# Patient Record
Sex: FEMALE | Race: WHITE | NOT HISPANIC OR LATINO | Employment: OTHER | URBAN - METROPOLITAN AREA
[De-identification: names, ages, dates, MRNs, and addresses within clinical notes are randomized per-mention and may not be internally consistent; named-entity substitution may affect disease eponyms.]

---

## 2017-08-25 ENCOUNTER — CONVERSION ENCOUNTER (OUTPATIENT)
Dept: MAMMOGRAPHY | Facility: HOSPITAL | Age: 72
End: 2017-08-25

## 2017-09-06 ENCOUNTER — CONVERSION ENCOUNTER (OUTPATIENT)
Dept: MAMMOGRAPHY | Facility: HOSPITAL | Age: 72
End: 2017-09-06

## 2018-01-30 ENCOUNTER — OFFICE VISIT CONVERTED (OUTPATIENT)
Dept: FAMILY MEDICINE CLINIC | Facility: CLINIC | Age: 73
End: 2018-01-30
Attending: NURSE PRACTITIONER

## 2018-03-06 ENCOUNTER — CONVERSION ENCOUNTER (OUTPATIENT)
Dept: MAMMOGRAPHY | Facility: HOSPITAL | Age: 73
End: 2018-03-06

## 2018-06-07 ENCOUNTER — OFFICE VISIT CONVERTED (OUTPATIENT)
Dept: FAMILY MEDICINE CLINIC | Facility: CLINIC | Age: 73
End: 2018-06-07
Attending: NURSE PRACTITIONER

## 2018-06-07 ENCOUNTER — CONVERSION ENCOUNTER (OUTPATIENT)
Dept: FAMILY MEDICINE CLINIC | Facility: CLINIC | Age: 73
End: 2018-06-07

## 2018-07-27 ENCOUNTER — CONVERSION ENCOUNTER (OUTPATIENT)
Dept: MAMMOGRAPHY | Facility: HOSPITAL | Age: 73
End: 2018-07-27

## 2018-08-14 ENCOUNTER — OFFICE VISIT CONVERTED (OUTPATIENT)
Dept: FAMILY MEDICINE CLINIC | Facility: CLINIC | Age: 73
End: 2018-08-14
Attending: NURSE PRACTITIONER

## 2018-12-13 ENCOUNTER — OFFICE VISIT CONVERTED (OUTPATIENT)
Dept: FAMILY MEDICINE CLINIC | Facility: CLINIC | Age: 73
End: 2018-12-13
Attending: NURSE PRACTITIONER

## 2019-02-09 ENCOUNTER — OFFICE VISIT CONVERTED (OUTPATIENT)
Dept: FAMILY MEDICINE CLINIC | Facility: CLINIC | Age: 74
End: 2019-02-09
Attending: FAMILY MEDICINE

## 2019-04-11 ENCOUNTER — HOSPITAL ENCOUNTER (OUTPATIENT)
Dept: OTHER | Facility: HOSPITAL | Age: 74
Discharge: HOME | End: 2019-04-11
Attending: INTERNAL MEDICINE

## 2019-04-11 LAB
ALBUMIN SERPL-MCNC: 4.1 G/DL (ref 3.5–5)
ALBUMIN/GLOB SERPL: 1.9 {RATIO} (ref 1.4–2.6)
ALP SERPL-CCNC: 95 U/L (ref 43–160)
ALT SERPL-CCNC: 23 U/L (ref 10–40)
ANION GAP SERPL CALC-SCNC: 18 MMOL/L (ref 8–19)
APPEARANCE UR: ABNORMAL
AST SERPL-CCNC: 21 U/L (ref 15–50)
BILIRUB SERPL-MCNC: 0.72 MG/DL (ref 0.2–1.3)
BILIRUB UR QL: NEGATIVE
BUN SERPL-MCNC: 21 MG/DL (ref 5–25)
BUN/CREAT SERPL: 19 {RATIO} (ref 6–20)
CALCIUM SERPL-MCNC: 9 MG/DL (ref 8.7–10.4)
CHLORIDE SERPL-SCNC: 106 MMOL/L (ref 99–111)
COLOR UR: YELLOW
CONV BACTERIA: NEGATIVE
CONV CO2: 23 MMOL/L (ref 22–32)
CONV COLLECTION SOURCE (UA): ABNORMAL
CONV CREATININE URINE, RANDOM: 167.5 MG/DL (ref 10–300)
CONV HYALINE CASTS IN URINE MICRO: ABNORMAL /[LPF]
CONV MICROALBUM.,U,RANDOM: <12 MG/L (ref 0–20)
CONV PROTEIN TO CREATININE RATIO (RANDOM URINE): 0.04 {RATIO} (ref 0–0.1)
CONV TOTAL PROTEIN: 6.3 G/DL (ref 6.3–8.2)
CONV UROBILINOGEN IN URINE BY AUTOMATED TEST STRIP: 0.2 {EHRLICHU}/DL (ref 0.1–1)
CREAT UR-MCNC: 1.12 MG/DL (ref 0.5–0.9)
GFR SERPLBLD BASED ON 1.73 SQ M-ARVRAT: 49 ML/MIN/{1.73_M2}
GLOBULIN UR ELPH-MCNC: 2.2 G/DL (ref 2–3.5)
GLUCOSE SERPL-MCNC: 112 MG/DL (ref 65–99)
GLUCOSE UR QL: NEGATIVE MG/DL
HGB UR QL STRIP: ABNORMAL
KETONES UR QL STRIP: NEGATIVE MG/DL
LEUKOCYTE ESTERASE UR QL STRIP: ABNORMAL
MICROALBUMIN/CREAT UR: 7.2 MG/G{CRE} (ref 0–35)
NITRITE UR QL STRIP: NEGATIVE
OSMOLALITY SERPL CALC.SUM OF ELEC: 300 MOSM/KG (ref 273–304)
PH UR STRIP.AUTO: 5.5 [PH] (ref 5–8)
POTASSIUM SERPL-SCNC: 4.2 MMOL/L (ref 3.5–5.3)
PROT UR QL: NEGATIVE MG/DL
PROT UR-MCNC: 7.5 MG/DL
RBC #/AREA URNS HPF: ABNORMAL /[HPF]
SODIUM SERPL-SCNC: 143 MMOL/L (ref 135–147)
SP GR UR: 1.02 (ref 1–1.03)
SQUAMOUS SPT QL MICRO: ABNORMAL /[HPF]
WBC #/AREA URNS HPF: ABNORMAL /[HPF]

## 2019-04-24 ENCOUNTER — OFFICE VISIT CONVERTED (OUTPATIENT)
Dept: FAMILY MEDICINE CLINIC | Facility: CLINIC | Age: 74
End: 2019-04-24
Attending: NURSE PRACTITIONER

## 2019-06-10 ENCOUNTER — HOSPITAL ENCOUNTER (OUTPATIENT)
Dept: FAMILY MEDICINE CLINIC | Facility: CLINIC | Age: 74
Discharge: HOME | End: 2019-06-10
Attending: NURSE PRACTITIONER

## 2019-06-10 ENCOUNTER — OFFICE VISIT CONVERTED (OUTPATIENT)
Dept: FAMILY MEDICINE CLINIC | Facility: CLINIC | Age: 74
End: 2019-06-10
Attending: NURSE PRACTITIONER

## 2019-06-10 LAB
ALBUMIN SERPL-MCNC: 4.4 G/DL (ref 3.5–5)
ALBUMIN/GLOB SERPL: 1.9 {RATIO} (ref 1.4–2.6)
ALP SERPL-CCNC: 100 U/L (ref 43–160)
ALT SERPL-CCNC: 25 U/L (ref 10–40)
ANION GAP SERPL CALC-SCNC: 18 MMOL/L (ref 8–19)
AST SERPL-CCNC: 23 U/L (ref 15–50)
BILIRUB SERPL-MCNC: 0.72 MG/DL (ref 0.2–1.3)
BUN SERPL-MCNC: 23 MG/DL (ref 5–25)
BUN/CREAT SERPL: 20 {RATIO} (ref 6–20)
CALCIUM SERPL-MCNC: 9.4 MG/DL (ref 8.7–10.4)
CHLORIDE SERPL-SCNC: 106 MMOL/L (ref 99–111)
CHOLEST SERPL-MCNC: 159 MG/DL (ref 107–200)
CHOLEST/HDLC SERPL: 2.6 {RATIO} (ref 3–6)
CONV CO2: 23 MMOL/L (ref 22–32)
CONV TOTAL PROTEIN: 6.7 G/DL (ref 6.3–8.2)
CREAT UR-MCNC: 1.17 MG/DL (ref 0.5–0.9)
GFR SERPLBLD BASED ON 1.73 SQ M-ARVRAT: 46 ML/MIN/{1.73_M2}
GLOBULIN UR ELPH-MCNC: 2.3 G/DL (ref 2–3.5)
GLUCOSE SERPL-MCNC: 113 MG/DL (ref 65–99)
HDLC SERPL-MCNC: 61 MG/DL (ref 40–60)
LDLC SERPL CALC-MCNC: 74 MG/DL (ref 70–100)
OSMOLALITY SERPL CALC.SUM OF ELEC: 298 MOSM/KG (ref 273–304)
POTASSIUM SERPL-SCNC: 4.9 MMOL/L (ref 3.5–5.3)
SODIUM SERPL-SCNC: 142 MMOL/L (ref 135–147)
TRIGL SERPL-MCNC: 122 MG/DL (ref 40–150)
VLDLC SERPL-MCNC: 24 MG/DL (ref 5–37)

## 2019-06-21 ENCOUNTER — HOSPITAL ENCOUNTER (OUTPATIENT)
Dept: MAMMOGRAPHY | Facility: HOSPITAL | Age: 74
Discharge: HOME | End: 2019-06-21
Attending: NURSE PRACTITIONER

## 2019-07-09 ENCOUNTER — OFFICE VISIT CONVERTED (OUTPATIENT)
Dept: FAMILY MEDICINE CLINIC | Facility: CLINIC | Age: 74
End: 2019-07-09
Attending: NURSE PRACTITIONER

## 2019-09-17 ENCOUNTER — OFFICE VISIT CONVERTED (OUTPATIENT)
Dept: FAMILY MEDICINE CLINIC | Facility: CLINIC | Age: 74
End: 2019-09-17
Attending: NURSE PRACTITIONER

## 2019-09-17 ENCOUNTER — HOSPITAL ENCOUNTER (OUTPATIENT)
Dept: FAMILY MEDICINE CLINIC | Facility: CLINIC | Age: 74
Discharge: HOME | End: 2019-09-17
Attending: NURSE PRACTITIONER

## 2019-09-17 LAB
ALBUMIN SERPL-MCNC: 4.3 G/DL (ref 3.5–5)
ALBUMIN/GLOB SERPL: 1.8 {RATIO} (ref 1.4–2.6)
ALP SERPL-CCNC: 106 U/L (ref 43–160)
ALT SERPL-CCNC: 21 U/L (ref 10–40)
ANION GAP SERPL CALC-SCNC: 20 MMOL/L (ref 8–19)
AST SERPL-CCNC: 22 U/L (ref 15–50)
BILIRUB SERPL-MCNC: 0.6 MG/DL (ref 0.2–1.3)
BNP SERPL-MCNC: 93 PG/ML (ref 0–900)
BUN SERPL-MCNC: 18 MG/DL (ref 5–25)
BUN/CREAT SERPL: 17 {RATIO} (ref 6–20)
CALCIUM SERPL-MCNC: 9.3 MG/DL (ref 8.7–10.4)
CHLORIDE SERPL-SCNC: 104 MMOL/L (ref 99–111)
CONV CO2: 23 MMOL/L (ref 22–32)
CONV TOTAL PROTEIN: 6.7 G/DL (ref 6.3–8.2)
CREAT UR-MCNC: 1.06 MG/DL (ref 0.5–0.9)
FOLATE SERPL-MCNC: 8.7 NG/ML (ref 4.8–20)
GFR SERPLBLD BASED ON 1.73 SQ M-ARVRAT: 52 ML/MIN/{1.73_M2}
GLOBULIN UR ELPH-MCNC: 2.4 G/DL (ref 2–3.5)
GLUCOSE SERPL-MCNC: 113 MG/DL (ref 65–99)
OSMOLALITY SERPL CALC.SUM OF ELEC: 299 MOSM/KG (ref 273–304)
POTASSIUM SERPL-SCNC: 4.4 MMOL/L (ref 3.5–5.3)
SODIUM SERPL-SCNC: 143 MMOL/L (ref 135–147)
VIT B12 SERPL-MCNC: 378 PG/ML (ref 211–911)

## 2019-09-18 ENCOUNTER — HOSPITAL ENCOUNTER (OUTPATIENT)
Dept: CARDIOLOGY | Facility: HOSPITAL | Age: 74
Discharge: HOME | End: 2019-09-18
Attending: NURSE PRACTITIONER

## 2019-09-24 ENCOUNTER — OFFICE VISIT CONVERTED (OUTPATIENT)
Dept: FAMILY MEDICINE CLINIC | Facility: CLINIC | Age: 74
End: 2019-09-24
Attending: NURSE PRACTITIONER

## 2019-10-10 ENCOUNTER — HOSPITAL ENCOUNTER (OUTPATIENT)
Dept: OTHER | Facility: HOSPITAL | Age: 74
Discharge: HOME | End: 2019-10-10
Attending: INTERNAL MEDICINE

## 2019-10-10 LAB
ALBUMIN SERPL-MCNC: 4.2 G/DL (ref 3.5–5)
ALBUMIN/GLOB SERPL: 2 {RATIO} (ref 1.4–2.6)
ALP SERPL-CCNC: 110 U/L (ref 43–160)
ALT SERPL-CCNC: 24 U/L (ref 10–40)
ANION GAP SERPL CALC-SCNC: 19 MMOL/L (ref 8–19)
APPEARANCE UR: ABNORMAL
AST SERPL-CCNC: 21 U/L (ref 15–50)
BILIRUB SERPL-MCNC: 0.53 MG/DL (ref 0.2–1.3)
BILIRUB UR QL: NEGATIVE
BUN SERPL-MCNC: 19 MG/DL (ref 5–25)
BUN/CREAT SERPL: 16 {RATIO} (ref 6–20)
CALCIUM SERPL-MCNC: 9.3 MG/DL (ref 8.7–10.4)
CHLORIDE SERPL-SCNC: 103 MMOL/L (ref 99–111)
COLOR UR: YELLOW
CONV CO2: 21 MMOL/L (ref 22–32)
CONV COLLECTION SOURCE (UA): ABNORMAL
CONV CREATININE URINE, RANDOM: 190.5 MG/DL (ref 10–300)
CONV MICROALBUM.,U,RANDOM: <12 MG/L (ref 0–20)
CONV PROTEIN TO CREATININE RATIO (RANDOM URINE): 0.06 {RATIO} (ref 0–0.1)
CONV TOTAL PROTEIN: 6.3 G/DL (ref 6.3–8.2)
CONV UROBILINOGEN IN URINE BY AUTOMATED TEST STRIP: 1 {EHRLICHU}/DL (ref 0.1–1)
CREAT UR-MCNC: 1.17 MG/DL (ref 0.5–0.9)
GFR SERPLBLD BASED ON 1.73 SQ M-ARVRAT: 46 ML/MIN/{1.73_M2}
GLOBULIN UR ELPH-MCNC: 2.1 G/DL (ref 2–3.5)
GLUCOSE SERPL-MCNC: 119 MG/DL (ref 65–99)
GLUCOSE UR QL: NEGATIVE MG/DL
HGB UR QL STRIP: NEGATIVE
KETONES UR QL STRIP: NEGATIVE MG/DL
LEUKOCYTE ESTERASE UR QL STRIP: NEGATIVE
MICROALBUMIN/CREAT UR: 6.3 MG/G{CRE} (ref 0–35)
MUCOUS THREADS URNS QL MICRO: ABNORMAL
NITRITE UR QL STRIP: NEGATIVE
OSMOLALITY SERPL CALC.SUM OF ELEC: 291 MOSM/KG (ref 273–304)
PH UR STRIP.AUTO: 5 [PH] (ref 5–8)
POTASSIUM SERPL-SCNC: 4.2 MMOL/L (ref 3.5–5.3)
PROT UR QL: NEGATIVE MG/DL
PROT UR-MCNC: 12 MG/DL
RBC #/AREA URNS HPF: ABNORMAL /[HPF]
SODIUM SERPL-SCNC: 139 MMOL/L (ref 135–147)
SP GR UR: 1.02 (ref 1–1.03)
SQUAMOUS SPT QL MICRO: ABNORMAL /[HPF]
WBC #/AREA URNS HPF: ABNORMAL /[HPF]

## 2019-11-25 ENCOUNTER — HOSPITAL ENCOUNTER (OUTPATIENT)
Dept: CARDIOLOGY | Facility: HOSPITAL | Age: 74
Discharge: HOME | End: 2019-11-25
Attending: INTERNAL MEDICINE

## 2019-12-31 ENCOUNTER — HOSPITAL ENCOUNTER (OUTPATIENT)
Dept: FAMILY MEDICINE CLINIC | Facility: CLINIC | Age: 74
Discharge: HOME | End: 2019-12-31
Attending: NURSE PRACTITIONER

## 2019-12-31 ENCOUNTER — OFFICE VISIT CONVERTED (OUTPATIENT)
Dept: FAMILY MEDICINE CLINIC | Facility: CLINIC | Age: 74
End: 2019-12-31
Attending: NURSE PRACTITIONER

## 2019-12-31 LAB
ALBUMIN SERPL-MCNC: 4.4 G/DL (ref 3.5–5)
ALBUMIN/GLOB SERPL: 1.7 {RATIO} (ref 1.4–2.6)
ALP SERPL-CCNC: 109 U/L (ref 43–160)
ALT SERPL-CCNC: 20 U/L (ref 10–40)
ANION GAP SERPL CALC-SCNC: 20 MMOL/L (ref 8–19)
AST SERPL-CCNC: 19 U/L (ref 15–50)
BILIRUB SERPL-MCNC: 0.65 MG/DL (ref 0.2–1.3)
BUN SERPL-MCNC: 20 MG/DL (ref 5–25)
BUN/CREAT SERPL: 18 {RATIO} (ref 6–20)
CALCIUM SERPL-MCNC: 9.5 MG/DL (ref 8.7–10.4)
CHLORIDE SERPL-SCNC: 101 MMOL/L (ref 99–111)
CHOLEST SERPL-MCNC: 158 MG/DL (ref 107–200)
CHOLEST/HDLC SERPL: 2.5 {RATIO} (ref 3–6)
CONV CO2: 23 MMOL/L (ref 22–32)
CONV TOTAL PROTEIN: 7 G/DL (ref 6.3–8.2)
CREAT UR-MCNC: 1.11 MG/DL (ref 0.5–0.9)
GFR SERPLBLD BASED ON 1.73 SQ M-ARVRAT: 49 ML/MIN/{1.73_M2}
GLOBULIN UR ELPH-MCNC: 2.6 G/DL (ref 2–3.5)
GLUCOSE SERPL-MCNC: 106 MG/DL (ref 65–99)
HDLC SERPL-MCNC: 64 MG/DL (ref 40–60)
LDLC SERPL CALC-MCNC: 73 MG/DL (ref 70–100)
OSMOLALITY SERPL CALC.SUM OF ELEC: 293 MOSM/KG (ref 273–304)
POTASSIUM SERPL-SCNC: 4.3 MMOL/L (ref 3.5–5.3)
SODIUM SERPL-SCNC: 140 MMOL/L (ref 135–147)
TRIGL SERPL-MCNC: 105 MG/DL (ref 40–150)
VLDLC SERPL-MCNC: 21 MG/DL (ref 5–37)

## 2020-06-12 ENCOUNTER — HOSPITAL ENCOUNTER (OUTPATIENT)
Dept: FAMILY MEDICINE CLINIC | Facility: CLINIC | Age: 75
Discharge: HOME | End: 2020-06-12
Attending: NURSE PRACTITIONER

## 2020-06-12 ENCOUNTER — OFFICE VISIT CONVERTED (OUTPATIENT)
Dept: FAMILY MEDICINE CLINIC | Facility: CLINIC | Age: 75
End: 2020-06-12
Attending: NURSE PRACTITIONER

## 2020-06-12 LAB
ALBUMIN SERPL-MCNC: 4.3 G/DL (ref 3.5–5)
ALBUMIN/GLOB SERPL: 1.8 {RATIO} (ref 1.4–2.6)
ALP SERPL-CCNC: 107 U/L (ref 43–160)
ALT SERPL-CCNC: 17 U/L (ref 10–40)
ANION GAP SERPL CALC-SCNC: 16 MMOL/L (ref 8–19)
APPEARANCE UR: ABNORMAL
AST SERPL-CCNC: 18 U/L (ref 15–50)
BASOPHILS # BLD AUTO: 0.09 10*3/UL (ref 0–0.2)
BASOPHILS NFR BLD AUTO: 1.1 % (ref 0–3)
BILIRUB SERPL-MCNC: 0.88 MG/DL (ref 0.2–1.3)
BILIRUB UR QL: NEGATIVE
BUN SERPL-MCNC: 20 MG/DL (ref 5–25)
BUN/CREAT SERPL: 18 {RATIO} (ref 6–20)
CALCIUM SERPL-MCNC: 9.3 MG/DL (ref 8.7–10.4)
CHLORIDE SERPL-SCNC: 105 MMOL/L (ref 99–111)
CHOLEST SERPL-MCNC: 135 MG/DL (ref 107–200)
CHOLEST/HDLC SERPL: 2.5 {RATIO} (ref 3–6)
COLOR UR: YELLOW
CONV ABS IMM GRAN: 0.03 10*3/UL (ref 0–0.2)
CONV BACTERIA: NEGATIVE
CONV CO2: 24 MMOL/L (ref 22–32)
CONV COLLECTION SOURCE (UA): ABNORMAL
CONV CREATININE URINE, RANDOM: 247.6 MG/DL (ref 10–300)
CONV HYALINE CASTS IN URINE MICRO: ABNORMAL /[LPF]
CONV IMMATURE GRAN: 0.4 % (ref 0–1.8)
CONV MICROALBUM.,U,RANDOM: <12 MG/L (ref 0–20)
CONV TOTAL PROTEIN: 6.7 G/DL (ref 6.3–8.2)
CONV UROBILINOGEN IN URINE BY AUTOMATED TEST STRIP: 0.2 {EHRLICHU}/DL (ref 0.1–1)
CREAT UR-MCNC: 1.09 MG/DL (ref 0.5–0.9)
DEPRECATED RDW RBC AUTO: 50.8 FL (ref 36.4–46.3)
EOSINOPHIL # BLD AUTO: 0.22 10*3/UL (ref 0–0.7)
EOSINOPHIL # BLD AUTO: 2.7 % (ref 0–7)
ERYTHROCYTE [DISTWIDTH] IN BLOOD BY AUTOMATED COUNT: 14.1 % (ref 11.7–14.4)
FOLATE SERPL-MCNC: 12.9 NG/ML (ref 4.8–20)
GFR SERPLBLD BASED ON 1.73 SQ M-ARVRAT: 50 ML/MIN/{1.73_M2}
GLOBULIN UR ELPH-MCNC: 2.4 G/DL (ref 2–3.5)
GLUCOSE SERPL-MCNC: 114 MG/DL (ref 65–99)
GLUCOSE UR QL: NEGATIVE MG/DL
HCT VFR BLD AUTO: 41.9 % (ref 37–47)
HDLC SERPL-MCNC: 53 MG/DL (ref 40–60)
HGB BLD-MCNC: 13 G/DL (ref 12–16)
HGB UR QL STRIP: NEGATIVE
KETONES UR QL STRIP: NEGATIVE MG/DL
LDLC SERPL CALC-MCNC: 60 MG/DL (ref 70–100)
LEUKOCYTE ESTERASE UR QL STRIP: NEGATIVE
LYMPHOCYTES # BLD AUTO: 2.05 10*3/UL (ref 1–5)
LYMPHOCYTES NFR BLD AUTO: 25 % (ref 20–45)
MCH RBC QN AUTO: 30.2 PG (ref 27–31)
MCHC RBC AUTO-ENTMCNC: 31 G/DL (ref 33–37)
MCV RBC AUTO: 97.4 FL (ref 81–99)
MICROALBUMIN/CREAT UR: 4.8 MG/G{CRE} (ref 0–35)
MONOCYTES # BLD AUTO: 0.85 10*3/UL (ref 0.2–1.2)
MONOCYTES NFR BLD AUTO: 10.4 % (ref 3–10)
NEUTROPHILS # BLD AUTO: 4.96 10*3/UL (ref 2–8)
NEUTROPHILS NFR BLD AUTO: 60.4 % (ref 30–85)
NITRITE UR QL STRIP: NEGATIVE
NRBC CBCN: 0 % (ref 0–0.7)
OSMOLALITY SERPL CALC.SUM OF ELEC: 295 MOSM/KG (ref 273–304)
PH UR STRIP.AUTO: 5 [PH] (ref 5–8)
PLATELET # BLD AUTO: 310 10*3/UL (ref 130–400)
PMV BLD AUTO: 11.9 FL (ref 9.4–12.3)
POTASSIUM SERPL-SCNC: 4.4 MMOL/L (ref 3.5–5.3)
PROT UR QL: NEGATIVE MG/DL
RBC # BLD AUTO: 4.3 10*6/UL (ref 4.2–5.4)
RBC #/AREA URNS HPF: ABNORMAL /[HPF]
SODIUM SERPL-SCNC: 141 MMOL/L (ref 135–147)
SP GR UR: 1.03 (ref 1–1.03)
SQUAMOUS SPT QL MICRO: ABNORMAL /[HPF]
T4 FREE SERPL-MCNC: 1.3 NG/DL (ref 0.9–1.8)
TRIGL SERPL-MCNC: 110 MG/DL (ref 40–150)
TSH SERPL-ACNC: 2.99 M[IU]/L (ref 0.27–4.2)
VIT B12 SERPL-MCNC: >2000 PG/ML (ref 211–911)
VLDLC SERPL-MCNC: 22 MG/DL (ref 5–37)
WBC # BLD AUTO: 8.2 10*3/UL (ref 4.8–10.8)
WBC #/AREA URNS HPF: ABNORMAL /[HPF]

## 2020-06-24 ENCOUNTER — HOSPITAL ENCOUNTER (OUTPATIENT)
Dept: OTHER | Facility: HOSPITAL | Age: 75
Discharge: HOME | End: 2020-06-24
Attending: NURSE PRACTITIONER

## 2020-09-18 ENCOUNTER — OFFICE VISIT CONVERTED (OUTPATIENT)
Dept: FAMILY MEDICINE CLINIC | Facility: CLINIC | Age: 75
End: 2020-09-18
Attending: NURSE PRACTITIONER

## 2020-10-08 ENCOUNTER — HOSPITAL ENCOUNTER (OUTPATIENT)
Dept: FAMILY MEDICINE CLINIC | Facility: CLINIC | Age: 75
Discharge: HOME | End: 2020-10-08
Attending: INTERNAL MEDICINE

## 2020-10-08 LAB
ALBUMIN SERPL-MCNC: 4.1 G/DL (ref 3.5–5)
ALBUMIN/GLOB SERPL: 2 {RATIO} (ref 1.4–2.6)
ALP SERPL-CCNC: 105 U/L (ref 43–160)
ALT SERPL-CCNC: 20 U/L (ref 10–40)
ANION GAP SERPL CALC-SCNC: 16 MMOL/L (ref 8–19)
APPEARANCE UR: ABNORMAL
AST SERPL-CCNC: 19 U/L (ref 15–50)
BILIRUB SERPL-MCNC: 0.95 MG/DL (ref 0.2–1.3)
BILIRUB UR QL: NEGATIVE
BUN SERPL-MCNC: 12 MG/DL (ref 5–25)
BUN/CREAT SERPL: 12 {RATIO} (ref 6–20)
CALCIUM SERPL-MCNC: 8.7 MG/DL (ref 8.7–10.4)
CHLORIDE SERPL-SCNC: 105 MMOL/L (ref 99–111)
COLOR UR: YELLOW
CONV BACTERIA: ABNORMAL
CONV CO2: 23 MMOL/L (ref 22–32)
CONV COLLECTION SOURCE (UA): ABNORMAL
CONV CREATININE URINE, RANDOM: 166 MG/DL (ref 10–300)
CONV HYALINE CASTS IN URINE MICRO: ABNORMAL /[LPF]
CONV MICROALBUM.,U,RANDOM: 25 MG/L (ref 0–20)
CONV PROTEIN TO CREATININE RATIO (RANDOM URINE): 0.08 {RATIO} (ref 0–0.1)
CONV TOTAL PROTEIN: 6.2 G/DL (ref 6.3–8.2)
CONV UROBILINOGEN IN URINE BY AUTOMATED TEST STRIP: 1 {EHRLICHU}/DL (ref 0.1–1)
CREAT UR-MCNC: 1.03 MG/DL (ref 0.5–0.9)
GFR SERPLBLD BASED ON 1.73 SQ M-ARVRAT: 53 ML/MIN/{1.73_M2}
GLOBULIN UR ELPH-MCNC: 2.1 G/DL (ref 2–3.5)
GLUCOSE SERPL-MCNC: 103 MG/DL (ref 65–99)
GLUCOSE UR QL: NEGATIVE MG/DL
HGB UR QL STRIP: ABNORMAL
KETONES UR QL STRIP: NEGATIVE MG/DL
LEUKOCYTE ESTERASE UR QL STRIP: ABNORMAL
MICROALBUMIN/CREAT UR: 15.1 MG/G{CRE} (ref 0–35)
NITRITE UR QL STRIP: POSITIVE
OSMOLALITY SERPL CALC.SUM OF ELEC: 290 MOSM/KG (ref 273–304)
PH UR STRIP.AUTO: 5.5 [PH] (ref 5–8)
POTASSIUM SERPL-SCNC: 4.4 MMOL/L (ref 3.5–5.3)
PROT UR QL: NEGATIVE MG/DL
PROT UR-MCNC: 13.2 MG/DL
RBC #/AREA URNS HPF: ABNORMAL /[HPF]
SODIUM SERPL-SCNC: 140 MMOL/L (ref 135–147)
SP GR UR: 1.02 (ref 1–1.03)
SQUAMOUS SPT QL MICRO: ABNORMAL /[HPF]
WBC #/AREA URNS HPF: ABNORMAL /[HPF]

## 2021-01-04 ENCOUNTER — OFFICE VISIT CONVERTED (OUTPATIENT)
Dept: FAMILY MEDICINE CLINIC | Facility: CLINIC | Age: 76
End: 2021-01-04
Attending: NURSE PRACTITIONER

## 2021-01-04 ENCOUNTER — HOSPITAL ENCOUNTER (OUTPATIENT)
Dept: FAMILY MEDICINE CLINIC | Facility: CLINIC | Age: 76
Discharge: HOME | End: 2021-01-04
Attending: NURSE PRACTITIONER

## 2021-01-04 LAB
APPEARANCE UR: ABNORMAL
BASOPHILS # BLD AUTO: 0.06 10*3/UL (ref 0–0.2)
BASOPHILS NFR BLD AUTO: 0.7 % (ref 0–3)
BILIRUB UR QL: NEGATIVE
CHOLEST SERPL-MCNC: 158 MG/DL (ref 107–200)
CHOLEST/HDLC SERPL: 2.8 {RATIO} (ref 3–6)
COLOR UR: YELLOW
CONV ABS IMM GRAN: 0.05 10*3/UL (ref 0–0.2)
CONV BACTERIA: ABNORMAL
CONV COLLECTION SOURCE (UA): ABNORMAL
CONV HYALINE CASTS IN URINE MICRO: ABNORMAL /[LPF]
CONV IMMATURE GRAN: 0.6 % (ref 0–1.8)
CONV UROBILINOGEN IN URINE BY AUTOMATED TEST STRIP: 1 {EHRLICHU}/DL (ref 0.1–1)
DEPRECATED RDW RBC AUTO: 50 FL (ref 36.4–46.3)
EOSINOPHIL # BLD AUTO: 0.17 10*3/UL (ref 0–0.7)
EOSINOPHIL # BLD AUTO: 2.1 % (ref 0–7)
ERYTHROCYTE [DISTWIDTH] IN BLOOD BY AUTOMATED COUNT: 13.8 % (ref 11.7–14.4)
GLUCOSE UR QL: NEGATIVE MG/DL
HCT VFR BLD AUTO: 44.7 % (ref 37–47)
HDLC SERPL-MCNC: 57 MG/DL (ref 40–60)
HGB BLD-MCNC: 13.8 G/DL (ref 12–16)
HGB UR QL STRIP: ABNORMAL
KETONES UR QL STRIP: NEGATIVE MG/DL
LDLC SERPL CALC-MCNC: 72 MG/DL (ref 70–100)
LEUKOCYTE ESTERASE UR QL STRIP: ABNORMAL
LYMPHOCYTES # BLD AUTO: 1.99 10*3/UL (ref 1–5)
LYMPHOCYTES NFR BLD AUTO: 24.3 % (ref 20–45)
MCH RBC QN AUTO: 30.2 PG (ref 27–31)
MCHC RBC AUTO-ENTMCNC: 30.9 G/DL (ref 33–37)
MCV RBC AUTO: 97.8 FL (ref 81–99)
MONOCYTES # BLD AUTO: 0.6 10*3/UL (ref 0.2–1.2)
MONOCYTES NFR BLD AUTO: 7.3 % (ref 3–10)
NEUTROPHILS # BLD AUTO: 5.32 10*3/UL (ref 2–8)
NEUTROPHILS NFR BLD AUTO: 65 % (ref 30–85)
NITRITE UR QL STRIP: POSITIVE
NRBC CBCN: 0 % (ref 0–0.7)
PH UR STRIP.AUTO: 5 [PH] (ref 5–8)
PLATELET # BLD AUTO: 303 10*3/UL (ref 130–400)
PMV BLD AUTO: 12.5 FL (ref 9.4–12.3)
PROT UR QL: NEGATIVE MG/DL
RBC # BLD AUTO: 4.57 10*6/UL (ref 4.2–5.4)
RBC #/AREA URNS HPF: ABNORMAL /[HPF]
SP GR UR: 1.02 (ref 1–1.03)
SQUAMOUS SPT QL MICRO: ABNORMAL /[HPF]
TRIGL SERPL-MCNC: 146 MG/DL (ref 40–150)
VLDLC SERPL-MCNC: 29 MG/DL (ref 5–37)
WBC # BLD AUTO: 8.19 10*3/UL (ref 4.8–10.8)
WBC #/AREA URNS HPF: ABNORMAL /[HPF]

## 2021-01-19 ENCOUNTER — HOSPITAL ENCOUNTER (OUTPATIENT)
Dept: FAMILY MEDICINE CLINIC | Facility: CLINIC | Age: 76
Discharge: HOME | End: 2021-01-19
Attending: NURSE PRACTITIONER

## 2021-01-19 LAB
APPEARANCE UR: CLEAR
BILIRUB UR QL: NEGATIVE
COLOR UR: YELLOW
CONV BACTERIA: ABNORMAL
CONV COLLECTION SOURCE (UA): ABNORMAL
CONV UROBILINOGEN IN URINE BY AUTOMATED TEST STRIP: 1 {EHRLICHU}/DL (ref 0.1–1)
GLUCOSE UR QL: NEGATIVE MG/DL
HGB UR QL STRIP: NEGATIVE
KETONES UR QL STRIP: NEGATIVE MG/DL
LEUKOCYTE ESTERASE UR QL STRIP: ABNORMAL
NITRITE UR QL STRIP: NEGATIVE
PH UR STRIP.AUTO: 5.5 [PH] (ref 5–8)
PROT UR QL: NEGATIVE MG/DL
RBC #/AREA URNS HPF: ABNORMAL /[HPF]
SP GR UR: 1.02 (ref 1–1.03)
SQUAMOUS SPT QL MICRO: ABNORMAL /[HPF]
WBC #/AREA URNS HPF: ABNORMAL /[HPF]

## 2021-01-22 LAB — BACTERIA UR CULT: NORMAL

## 2021-04-08 ENCOUNTER — HOSPITAL ENCOUNTER (OUTPATIENT)
Dept: OTHER | Facility: HOSPITAL | Age: 76
Discharge: HOME | End: 2021-04-08
Attending: INTERNAL MEDICINE

## 2021-04-08 LAB
ALBUMIN SERPL-MCNC: 4 G/DL (ref 3.5–5)
ALBUMIN/GLOB SERPL: 1.4 {RATIO} (ref 1.4–2.6)
ALP SERPL-CCNC: 112 U/L (ref 43–160)
ALT SERPL-CCNC: 19 U/L (ref 10–40)
ANION GAP SERPL CALC-SCNC: 15 MMOL/L (ref 8–19)
APPEARANCE UR: CLEAR
AST SERPL-CCNC: 20 U/L (ref 15–50)
BILIRUB SERPL-MCNC: 1.17 MG/DL (ref 0.2–1.3)
BILIRUB UR QL: NEGATIVE
BUN SERPL-MCNC: 19 MG/DL (ref 5–25)
BUN/CREAT SERPL: 17 {RATIO} (ref 6–20)
CALCIUM SERPL-MCNC: 9.1 MG/DL (ref 8.7–10.4)
CHLORIDE SERPL-SCNC: 103 MMOL/L (ref 99–111)
COLOR UR: YELLOW
CONV BACTERIA: ABNORMAL
CONV CO2: 26 MMOL/L (ref 22–32)
CONV COLLECTION SOURCE (UA): ABNORMAL
CONV CREATININE URINE, RANDOM: 124.3 MG/DL (ref 10–300)
CONV MICROALBUM.,U,RANDOM: <12 MG/L (ref 0–20)
CONV PROTEIN TO CREATININE RATIO (RANDOM URINE): 0.11 {RATIO} (ref 0–0.1)
CONV TOTAL PROTEIN: 6.8 G/DL (ref 6.3–8.2)
CONV UROBILINOGEN IN URINE BY AUTOMATED TEST STRIP: 0.2 {EHRLICHU}/DL (ref 0.1–1)
CREAT UR-MCNC: 1.15 MG/DL (ref 0.5–0.9)
GFR SERPLBLD BASED ON 1.73 SQ M-ARVRAT: 47 ML/MIN/{1.73_M2}
GLOBULIN UR ELPH-MCNC: 2.8 G/DL (ref 2–3.5)
GLUCOSE SERPL-MCNC: 103 MG/DL (ref 65–99)
GLUCOSE UR QL: NEGATIVE MG/DL
HGB UR QL STRIP: ABNORMAL
KETONES UR QL STRIP: NEGATIVE MG/DL
LEUKOCYTE ESTERASE UR QL STRIP: ABNORMAL
MICROALBUMIN/CREAT UR: 9.7 MG/G{CRE} (ref 0–35)
NITRITE UR QL STRIP: NEGATIVE
OSMOLALITY SERPL CALC.SUM OF ELEC: 291 MOSM/KG (ref 273–304)
PH UR STRIP.AUTO: 6 [PH] (ref 5–8)
POTASSIUM SERPL-SCNC: 4.7 MMOL/L (ref 3.5–5.3)
PROT UR QL: NEGATIVE MG/DL
PROT UR-MCNC: 14.1 MG/DL
RBC #/AREA URNS HPF: ABNORMAL /[HPF]
SODIUM SERPL-SCNC: 139 MMOL/L (ref 135–147)
SP GR UR: 1.02 (ref 1–1.03)
SQUAMOUS SPT QL MICRO: ABNORMAL /[HPF]
WBC #/AREA URNS HPF: ABNORMAL /[HPF]

## 2021-04-14 ENCOUNTER — HOSPITAL ENCOUNTER (OUTPATIENT)
Dept: FAMILY MEDICINE CLINIC | Facility: CLINIC | Age: 76
Discharge: HOME | End: 2021-04-14
Attending: NURSE PRACTITIONER

## 2021-04-14 ENCOUNTER — OFFICE VISIT CONVERTED (OUTPATIENT)
Dept: FAMILY MEDICINE CLINIC | Facility: CLINIC | Age: 76
End: 2021-04-14
Attending: NURSE PRACTITIONER

## 2021-04-14 LAB
ALBUMIN SERPL-MCNC: 4.2 G/DL (ref 3.5–5)
ALBUMIN/GLOB SERPL: 1.7 {RATIO} (ref 1.4–2.6)
ALP SERPL-CCNC: 106 U/L (ref 43–160)
ALT SERPL-CCNC: 21 U/L (ref 10–40)
AMYLASE SERPL-CCNC: 70 U/L (ref 30–150)
ANION GAP SERPL CALC-SCNC: 17 MMOL/L (ref 8–19)
AST SERPL-CCNC: 20 U/L (ref 15–50)
BASOPHILS # BLD AUTO: 0.09 10*3/UL (ref 0–0.2)
BASOPHILS NFR BLD AUTO: 1.1 % (ref 0–3)
BILIRUB SERPL-MCNC: 0.99 MG/DL (ref 0.2–1.3)
BUN SERPL-MCNC: 20 MG/DL (ref 5–25)
BUN/CREAT SERPL: 18 {RATIO} (ref 6–20)
CALCIUM SERPL-MCNC: 9.1 MG/DL (ref 8.7–10.4)
CHLORIDE SERPL-SCNC: 106 MMOL/L (ref 99–111)
CONV ABS IMM GRAN: 0.06 10*3/UL (ref 0–0.2)
CONV CO2: 25 MMOL/L (ref 22–32)
CONV IMMATURE GRAN: 0.7 % (ref 0–1.8)
CONV TOTAL PROTEIN: 6.7 G/DL (ref 6.3–8.2)
CREAT UR-MCNC: 1.11 MG/DL (ref 0.5–0.9)
DEPRECATED RDW RBC AUTO: 48.5 FL (ref 36.4–46.3)
EOSINOPHIL # BLD AUTO: 0.17 10*3/UL (ref 0–0.7)
EOSINOPHIL # BLD AUTO: 2.1 % (ref 0–7)
ERYTHROCYTE [DISTWIDTH] IN BLOOD BY AUTOMATED COUNT: 13.6 % (ref 11.7–14.4)
GFR SERPLBLD BASED ON 1.73 SQ M-ARVRAT: 49 ML/MIN/{1.73_M2}
GLOBULIN UR ELPH-MCNC: 2.5 G/DL (ref 2–3.5)
GLUCOSE SERPL-MCNC: 103 MG/DL (ref 65–99)
HCT VFR BLD AUTO: 42.2 % (ref 37–47)
HGB BLD-MCNC: 13.3 G/DL (ref 12–16)
LIPASE SERPL-CCNC: 29 U/L (ref 5–51)
LYMPHOCYTES # BLD AUTO: 1.96 10*3/UL (ref 1–5)
LYMPHOCYTES NFR BLD AUTO: 23.9 % (ref 20–45)
MCH RBC QN AUTO: 30.2 PG (ref 27–31)
MCHC RBC AUTO-ENTMCNC: 31.5 G/DL (ref 33–37)
MCV RBC AUTO: 95.9 FL (ref 81–99)
MONOCYTES # BLD AUTO: 0.84 10*3/UL (ref 0.2–1.2)
MONOCYTES NFR BLD AUTO: 10.2 % (ref 3–10)
NEUTROPHILS # BLD AUTO: 5.09 10*3/UL (ref 2–8)
NEUTROPHILS NFR BLD AUTO: 62 % (ref 30–85)
NRBC CBCN: 0 % (ref 0–0.7)
OSMOLALITY SERPL CALC.SUM OF ELEC: 299 MOSM/KG (ref 273–304)
PLATELET # BLD AUTO: 326 10*3/UL (ref 130–400)
PMV BLD AUTO: 11.9 FL (ref 9.4–12.3)
POTASSIUM SERPL-SCNC: 4.8 MMOL/L (ref 3.5–5.3)
RBC # BLD AUTO: 4.4 10*6/UL (ref 4.2–5.4)
SODIUM SERPL-SCNC: 143 MMOL/L (ref 135–147)
WBC # BLD AUTO: 8.21 10*3/UL (ref 4.8–10.8)

## 2021-04-15 ENCOUNTER — HOSPITAL ENCOUNTER (OUTPATIENT)
Dept: OTHER | Facility: HOSPITAL | Age: 76
Discharge: HOME | End: 2021-04-15
Attending: NURSE PRACTITIONER

## 2021-05-06 ENCOUNTER — HOSPITAL ENCOUNTER (OUTPATIENT)
Dept: NUCLEAR MEDICINE | Facility: HOSPITAL | Age: 76
Discharge: HOME | End: 2021-05-06
Attending: NURSE PRACTITIONER

## 2021-05-07 NOTE — PROGRESS NOTES
Progress Note      Patient Name: Deanne Pittman   Patient ID: [id number]   Sex: Female   YOB: 1945    Primary Care Provider: Huyen MEYER   Referring Provider: Huyen MEYER    Visit Date: January 4, 2021    Provider: BETSY Thomas   Location: [facility]   Location Address: [address] [place] [address] [place]  [address]   Location Phone: [phone]          Chief Complaint     Fasting labs, refill       History Of Present Illness  Deanne Pittman is a 75 year old /White female who presents for evaluation and treatment of:      follow up on chronic health conditions and medication refills     HTN with cardiomyopathy - she is known to Dr. Correa - last saw him a few months ago - maybe November - He didn't change anything for her - no c/o chest pain, palpitations, headaches, dizziness, shortness of breath or swelling in the legs.     She is seeing Dr. Alex Cabrera regarding the stage III CKD - things are stable there - will last see him in April.    HLD - she is taking Lipitor w/o c/o myalgia    Colonoscopy - last one 7 years ago - [facility]   DEXA - last June 2018  Mammogram - Last June 2019       Past Medical History  Disease Name Date Onset Notes   Cardiomyopathy --  --    Hyperlipidemia --  --    Hypertension --  --    Stage III chronic kidney disease 06/12/2020 --          Past Surgical History  Procedure Name Date Notes   Colostomy --  --    Colostomy Reversal 1995 --    Hysterectomy --  --    Liver Surgery 1974 --    Lumpectomy of left breast --  --          Medication List  Name Date Started Instructions   atorvastatin 20 mg oral tablet 01/04/2021 TAKE 1 TABLET DAILY AT BEDTIME for 90 days   carvedilol 6.25 mg oral tablet  take 1 tablet (6.25 mg) by oral route 2 times per day with food   losartan 25 mg oral tablet  take 1 tablet (25 mg) by oral route once daily   vitamin B12-folic acid 500-400 mcg oral tablet  --          Allergy List  Allergen Name Date  Reaction Notes   PENICILLINS --  --  --          Family Medical History  Disease Name Relative/Age Notes   Breast Neoplasm, Malignant Sister/   --    Rectal Neoplasm, Malignant Sister/   --    Lupus Erythematosus Sister/   --    Skin Carcinoma Mother/   --          Social History  Finding Status Start/Stop Quantity Notes   Tobacco Former --/-- .5ppd  quit Jan 2019         Immunizations  NameDate Admin Mfg Trade Name Lot Number Route Inj VIS Given VIS Publication   HepA02/26/2019 MSD VAQTA-ADULT [id number] IM [place] 02/26/2019 07/20/2016   Comments: 5508-3546-77   HepA08/14/2018 SKB HAVRIX-ADULT [id number] IM [place] 08/14/2018 07/20/2016   Comments: 68526-825-23   Lshyicsgr54/22/2020 PMC FLUZONE-HIGH DOSE [id number] IM [place] 10/22/2020    Comments: ndc 51923-891-77   Liiucmgzj9855/10/2018 MSD PNEUMOVAX 23 [id number] IM LA 12/10/2018 04/24/2015   Comments: NDC 1904-1584-27   Hofhdvz0925/08/2017 WAL PREVNAR 13 [id number] IM [facility] 08/14/2018 11/05/2015   Comments:    Tdap01/18/2019 PMC BOOSTRIX [id number] IM [place] 01/18/2019 02/24/2015   Comments: NDC 16821-342-01         Review of Systems  · Constitutional  o Admits  o : good general health lately  · Eyes  o Admits  o : discharge from eye  o Denies  o : eye discomfort, eye pain  · HENT  o Denies  o : headaches, sinus pain, nasal congestion, nasal discharge, sore throat, ear pain  · Cardiovascular  o Denies  o : chest pain, syncope, dyspnea on exertion, lower extremity edema, palpitations  · Respiratory  o Denies  o : shortness of breath, wheezing, cough  · Gastrointestinal  o Denies  o : nausea, vomiting, diarrhea, constipation, abdominal pain  · Genitourinary  o Denies  o : urgency, frequency, dysuria, urinary retention  · Integument  o Denies  o : skin dryness, hair growth change, nail changes  · Neurologic  o Denies  o : dizziness  · Endocrine  o Denies  o : polyuria, polydipsia, cold intolerance, heat intolerance  · Psychiatric  o Denies  o : anxiety, depression, difficulty sleeping, suicidal ideation,  "homicidal ideation      Vitals  Date Time BP Position Site L\R Cuff Size HR RR TEMP (F) WT  HT  BMI kg/m2 BSA m2 O2 Sat FR L/min FiO2 HC       06/12/2020 09:07 /80 Sitting    93 - R  97.1 147lbs 16oz 5'  1\" 27.96 1.7 95 %      09/18/2020 10:26 /80 Sitting    95 - R  97.5 147lbs 16oz    96 %      01/04/2021 08:56 /70 Sitting    82 - R  97.3 151lbs 0oz    98 %  21%          Physical Examination  · Constitutional  o Appearance  o : well developed, well-nourished, in no acute distress  · Neck  o Inspection/Palpation  o : normal appearance, no masses or tenderness, trachea midline  o Thyroid  o : no thyromegaly  · Respiratory  o Respiratory Effort  o : breathing unlabored  o Auscultation of Lungs  o : clear to auscultation  · Cardiovascular  o Heart  o :   § Auscultation of Heart  § : regular rate, normal rhythm, no murmurs present  o Peripheral Vascular System  o :   § Carotid Arteries  § : normal pulses bilaterally, no bruits present  § Pedal Pulses  § : bilateral pulse strength 2+  § Extremities  § : no edema  · Lymphatic  o Neck  o : no lymphadenopathy present  · Musculoskeletal  o General  o :   § General Musculoskeletal  § : Muscle tone, strength, and development grossly normal.  · Skin and Subcutaneous Tissue  o General Inspection  o : no lesions present, no areas of discoloration, skin turgor normal, texture normal  · Neurologic  o Gait and Station  o :   § Gait Screening  § : normal gait  · Psychiatric  o Mood and Affect  o : mood normal, affect appropriate          Assessment  · Hyperlipidemia     272.4/E78.5  · Stage III chronic kidney disease     585.3/N18.3  · Cardiomyopathy     425.8/I43  · Hypertension     401.9/I10      Plan  · Orders  o CBC with Auto Diff [facility] (81581) - 401.9/I10, 272.4/E78.5 - 01/04/2021  o Lipid Panel [facility] (59741) - 401.9/I10, 272.4/E78.5 - 01/04/2021  o Urinalysis with Reflex Microscopy if abnormal ([facility]) (85523) - 585.3/N18.3, 401.9/I10 - 01/04/2021  o ACO-19: " Colorectal cancer screening results documented and reviewed (3017F) - - 01/04/2021   c'scope 2013 - normal - recall 10 years  o ACO-39: Current medications updated and reviewed (1159F, ) - - 01/04/2021  · Medications  o atorvastatin 20 mg oral tablet   SIG: TAKE 1 TABLET DAILY AT BEDTIME for 90 days   DISP: (90) Tablet with 0 refills  Refilled on 01/04/2021     o Medications have been Reconciled  o Transition of Care or Provider Policy  · Instructions  o Advised that cheeses and other sources of dairy fats, animal fats, fast food, and the extras (candy, pasteries, pies, doughnuts and cookies) all contain LDL raising nutrients. Advised to increase fruits, vegetables, whole grains, and to monitor portion sizes.   o Take all medications as prescribed/directed.  o Patient was educated/instructed on their diagnosis, treatment and medications prior to discharge from the clinic today. Refills and fasting labs today. Next mammogram in June of this year, along with DEXA. Will attempt to get c'scope records.             Electronically Signed by: BETSY Thomas -Author on January 4, 2021 10:13:35 AM

## 2021-05-07 NOTE — PROGRESS NOTES
Progress Note      Patient Name: Deanne Pittman   Patient ID: [id number]   Sex: Female   YOB: 1945    Primary Care Provider: Huyen MEYER   Referring Provider: Huyen MEYER    Visit Date: April 14, 2021    Provider: BETSY Thomas   Location: [facility]   Location Address: [address] [place] [address] [place]  [address]   Location Phone: [phone]          Chief Complaint     nausea and vomiting, RUQ feels like someone knocked the air out       History Of Present Illness  Deanne Pittman is a 75 year old /White female who presents for evaluation and treatment of:      she cannot recall if she still has her gallbladder - she knows that they took her appendix - and she did have a punctured liver once, as well as colostomy following surgery for diverticulitis and then reversal -     She is having pain in the right side of her abdomen -states she was fine, then brushed her teeth, went to the bathroom and washed her hands and then all of a sudden she was vomiting like it was gushing out - then she went to lay down and it felt like she was gut punched - like someone punched her and knocked her breath out -she was going to call EMS but didn't - and then she thought she would call Tang, but she didn't want to wake him - this was all on Saturday night into Sunday -but since then she has had mild recurrences and usually after eating and even with something small     A few weeks ago her daughter pointed out to her the back of her legs - she states she can't see the back of her legs, but her daughter told her that her veins were all busted - she has known varicose veins and she went to Dr. Alarcon - saw him in [facility], but any procedures were to be done in [place] - noted to have venous insufficiency of the great saphenous veins - Dr. Alarcon rec'd an ablation but she didn't not want to have the surgery in [place] - she isn't sure if the issue       Past Medical  History  Disease Name Date Onset Notes   Cardiomyopathy --  --    Hyperlipidemia --  --    Hypertension --  --    Stage III chronic kidney disease 06/12/2020 --          Past Surgical History  Procedure Name Date Notes   Colostomy --  --    Colostomy Reversal 1995 --    Hysterectomy --  --    Liver Surgery 1974 --    Lumpectomy of left breast --  --          Medication List  Name Date Started Instructions   atorvastatin 20 mg oral tablet 01/04/2021 TAKE 1 TABLET DAILY AT BEDTIME for 90 days   carvedilol 6.25 mg oral tablet  take 1 tablet (6.25 mg) by oral route 2 times per day with food   losartan 25 mg oral tablet  take 1 tablet (25 mg) by oral route once daily   vitamin B12-folic acid 500-400 mcg oral tablet  --          Allergy List  Allergen Name Date Reaction Notes   PENICILLINS --  --  --          Family Medical History  Disease Name Relative/Age Notes   Breast Neoplasm, Malignant Sister/   --    Rectal Neoplasm, Malignant Sister/   --    Lupus Erythematosus Sister/   --    Skin Carcinoma Mother/   --          Social History  Finding Status Start/Stop Quantity Notes   Tobacco Former --/-- .5ppd  quit Jan 2019         Immunizations  NameDate Admin Mfg Trade Name Lot Number Route Inj VIS Given VIS Publication   HepA02/26/2019 MSD VAQTA-ADULT [id number] [facility] 02/26/2019 07/20/2016   Comments: 3654-3447-55   HepA08/14/2018 SKB HAVRIX-ADULT [id number] [facility] 08/14/2018 07/20/2016   Comments: 56799-514-58   Mgnezrypu44/22/2020 PMC FLUZONE-HIGH DOSE [id number] [facility] 10/22/2020    Comments: ndc 05699-438-90   Ayeetabkq4037/10/2018 MSD PNEUMOVAX 23 [id number] [facility] 12/10/2018 04/24/2015   Comments: NDC 1475-4305-49   Dsxbvmk7325/08/2017 WAL PREVNAR 13 [id number] IM [facility] 08/14/2018 11/05/2015   Comments:    Tdap01/18/2019 PMC BOOSTRIX GA5Z5 [facility] 01/18/2019 02/24/2015   Comments: NDC 05718-137-14         Review of Systems  · Constitutional  o Admits  o : good general health lately  o Denies  o : fever, chills, body  aches  · Cardiovascular  o Admits  o : varicosities, additional cardiovascular symptoms except as noted in the HPI  o Denies  o : chest pain, dyspnea on exertion, lower extremity edema, claudication, palpitations  · Respiratory  o Denies  o : shortness of breath, wheezing, cough  · Gastrointestinal  o Admits  o : nausea, vomiting, heartburn, excessive belching, abdominal pain  o Denies  o : diarrhea, constipation, dysphagia, reflux, blood in stools, hematochezia  · Integument  o Admits  o : pigmentation changes  · Neurologic  o Denies  o : dizziness      Vitals  Date Time BP Position Site L\R Cuff Size HR RR TEMP (F) WT  HT  BMI kg/m2 BSA m2 O2 Sat FR L/min FiO2 HC       09/18/2020 10:26 /80 Sitting    95 - R  97.5 147lbs 16oz    96 %      01/04/2021 08:56 /70 Sitting    82 - R  97.3 151lbs 0oz    98 %  21%    04/14/2021 10:58 /70 Sitting    69 - R  96.9 151lbs 0oz    100 %            Physical Examination  · Constitutional  o Appearance  o : overweight, well developed, alert, in no acute distress, well-tended appearance, no obvious deformities present  · Neck  o Inspection/Palpation  o : normal appearance, no masses or tenderness, trachea midline  o Thyroid  o : no thyromegaly  · Respiratory  o Respiratory Effort  o : breathing unlabored  o Auscultation of Lungs  o : clear to auscultation - no wheezing, rhonchi, or crackles  · Cardiovascular  o Heart  o :   § Auscultation of Heart  § : regular rate, normal rhythm, no murmurs present  o Peripheral Vascular System  o :   § Pedal Pulses  § : bilateral pulse strength 2+  § Extremities  § : no edema noted - bilateral LE varicosities - there is mottling of the skin on the posterior aspect of the LEs proximally, above the knee  · Gastrointestinal  o Abdominal Examination  o :   § Abdomen  § : soft, non-distended, TTP in the epigastrium and RUQ - no rebound tenderness or guarding; bowel sounds + - there is scarring present from previous abdominal surgeries  - no appreciable hernia noted  · Lymphatic  o Neck  o : no lymphadenopathy present  · Musculoskeletal  o General  o :   § General Musculoskeletal  § : Muscle tone, strength, and development grossly normal.  · Skin and Subcutaneous Tissue  o General Inspection  o : no lesions present, no areas of discoloration, skin turgor normal, texture normal  · Neurologic  o Gait and Station  o :   § Gait Screening  § : normal gait  · Psychiatric  o Mood and Affect  o : mood normal, affect appropriate          Assessment  · Mottled skin     709.09/L81.9  · Venous insufficiency     459.81/I87.2  · RUQ abdominal pain     789.01/R10.11  · Nausea & vomiting     787.01/R11.2  · Indigestion     536.8/K30      Plan  · Orders  o CBC with Auto Diff [facility] (63387) - 789.01/R10.11, 787.01/R11.2, 536.8/K30 - 04/14/2021  o CMP [facility] (75656) - 789.01/R10.11, 787.01/R11.2, 536.8/K30 - 04/14/2021  o ACO-39: Current medications updated and reviewed (1159F, ) - - 04/14/2021  o VASCULAR SURGERY CONSULTATION ([facility]) - 709.09/L81.9, 459.81/I87.2 - 04/14/2021   wants lan Morrison MD - saw Dorian in the past in [facility], but he only does procedures in [place] so she does not want to see him  o RUQ US (right upper quadrant ultrasound) (06692) - 789.01/R10.11, 787.01/R11.2, 536.8/K30 - 04/14/2021  o Amylase/Lipase Profile (ALPN) - 789.01/R10.11, 787.01/R11.2, 536.8/K30 - 04/14/2021  o H. pylori breath test (15634, 67644) - 789.01/R10.11, 787.01/R11.2, 536.8/K30 - 04/14/2021  · Medications  o Medications have been Reconciled  o Transition of Care or Provider Policy  · Instructions  o Patient was educated/instructed on their diagnosis, treatment and medications prior to discharge from the clinic today. Will check labs and get RUQ US - if negative for gallstones, then consider HIDA - if that is negative, and sxs persistent, then we will get CT d/t abdominal history. Referral back to vascular.   o Chronic conditions reviewed and taken into consideration for  today's treatment plan.            Electronically Signed by: BETSY Thomas -Author on April 14, 2021 11:33:00 AM

## 2021-05-07 NOTE — PROGRESS NOTES
"   Progress Note      Patient Name: Deanne Pittman   Patient ID: [id number]   Sex: Female   YOB: 1945    Primary Care Provider: Huyen MEYER   Referring Provider: Huyen MEYER    Visit Date: July 9, 2019    Provider: BETSY Thomas   Location: [facility]   Location Address: [address] [place] [address]   [place]  [address]   Location Phone: [phone]          Chief Complaint     legs and feet were swelling last week       History Of Present Illness  Deanne Pittman is a 73 year old /White female who presents for evaluation and treatment of:      last week she had some swelling of the bilateral lower extremities -- from the knee down -- she states her calves were tight and taught -- \"no flab\" -- last Wednesday she went to put her shoes on she couldn't put on anything that laced -- they were some better by Friday, and then almost resolved by weeks end.     She does not recall doing anything out of the norm -- no increased salt intake -- no change in fluid intake -- she wasn't on her feet any more than normal.     She used to take spironolactone up until labs showed decreased renal function. eGFR in September 2018 was 28, and most recently it went up to 46. She has been off of the diuretic since September of last year -- and this is the first occurrence of swelling that she has had.     She is still having intermittent indigestion at night -- maybe once per week. She is using Rolaids, but wonders if anything else would work better. No nausea, vomiting, or diarrhea. She gets occasional epigastric discomfort with the indigestion. No dysphagia. She does not wish to go for UGI. We discussed Zantac at her apt in June, but she waited to get it d/t she read the back and it gave a warning about kidney disease.       Past Medical History  Disease Name Date Onset Notes   Cardiomyopathy --  --    Hyperlipidemia --  --    Hypertension --  --          Past Surgical " History  Procedure Name Date Notes   Colostomy --  --    Colostomy Reversal 1995 --    Hysterectomy --  --    Liver Surgery 1974 --    Lumpectomy of left breast --  --          Medication List  Name Date Started Instructions   atorvastatin 20 mg oral tablet 06/11/2019 take 1 tablet (20 mg) by oral route once daily at bedtime   carvedilol 6.25 mg oral tablet  take 1 tablet (6.25 mg) by oral route 2 times per day with food   losartan 25 mg oral tablet  take 1 tablet (25 mg) by oral route once daily         Allergy List  Allergen Name Date Reaction Notes   PENICILLINS --  --  --          Family Medical History  Disease Name Relative/Age Notes   Breast Neoplasm, Malignant Sister/   --    Rectal Neoplasm, Malignant Sister/   --    Lupus Erythematosus Sister/   --    Skin Carcinoma Mother/   --          Social History  Finding Status Start/Stop Quantity Notes   Tobacco Former --/-- .5ppd  quit Jan 2019         Immunizations  NameDate Admin Mfg Trade Name Lot Number Route Inj VIS Given VIS Publication   HepA02/26/2019 MSD VAQTA-ADULT [id number] [facility] 02/26/2019 07/20/2016   Comments: 2977-8883-11   HepA08/14/2018 SKB HAVRIX-ADULT [id number] [facility] 08/14/2018 07/20/2016   Comments: 12342-653-45   Qmtlgoyly3923/10/2018 MSD PNEUMOVAX 23 [id number] [facility] 12/10/2018 04/24/2015   Comments: NDC 0300-8038-05   Cfxqbwr0287/08/2017 WAL PREVNAR 13 [id number]  [facility] 08/14/2018 11/05/2015   Comments:    Tdap01/18/2019 PMC BOOSTRIX GA5Z5 [facility] 01/18/2019 02/24/2015   Comments: NDC 89609-375-26         Review of Systems  · Constitutional  o Admits  o : good general health lately  o Denies  o : fatigue, fever, chills  · Cardiovascular  o Admits  o : lower extremity edema  o Denies  o : chest pain, syncope, dyspnea on exertion, lightheadedness, palpitations  · Respiratory  o Denies  o : shortness of breath  · Gastrointestinal  o Admits  o : heartburn, abdominal pain  o Denies  o : nausea, vomiting, diarrhea, constipation, dysphagia,  hematochezia  · Integument  o Denies  o : pigmentation changes  · Neurologic  o Denies  o : tingling or numbness  · Musculoskeletal  o Denies  o : muscle pain, muscle cramps      Vitals  Date Time BP Position Site L\R Cuff Size HR RR TEMP (F) WT  HT  BMI kg/m2 BSA m2 O2 Sat HC       07/09/2019 10:17 /80 Sitting    108 - R  97.5 143lbs 0oz    98 %          Physical Examination  · Constitutional  o Appearance  o : well developed, well-nourished, in no acute distress  · Respiratory  o Respiratory Effort  o : breathing unlabored  o Auscultation of Lungs  o : clear to ascultation  · Cardiovascular  o Heart  o :   § Auscultation of Heart  § : regular rate, normal rhythm, no murmurs present  o Peripheral Vascular System  o :   § Carotid Arteries  § : normal pulses bilaterally, no bruits present  § Pedal Pulses  § : bilateral pulse strength 2+  § Extremities  § : no edema, no cyanosis, no distal hair loss, normal capillary refill, varicosities present   · Gastrointestinal  o Abdominal Examination  o :   § Abdomen  § : soft, non-tender, non-distended, bowel sounds +  · Lymphatic  o Neck  o : no lymphadenopathy present  · Musculoskeletal  o General  o :   § General Musculoskeletal  § : Muscle tone, strength, and development grossly normal.  · Skin and Subcutaneous Tissue  o General Inspection  o : no lesions present, no areas of discoloration, skin turgor normal, texture normal  · Neurologic  o Gait and Station  o :   § Gait Screening  § : normal gait  · Psychiatric  o Mood and Affect  o : mood normal, affect appropriate          Assessment  · Bilateral lower extremity edema     782.3/R60.0  · Indigestion     536.8/K30      Plan  · Orders  o ACO-39: Current medications updated and reviewed () - - 07/09/2019  · Instructions  o Patient was educated/instructed on their diagnosis, treatment and medications prior to discharge from the clinic today. Discussed possible causes for LE edema -- venous insufficiency, CHF, etc  -- symptoms currently resolved, thus she wants to defer any workup for now. Advised to call with recurrent symptoms and I would work her in to be seen. For indigestion, I have advised her that is okay to use Zantac PRN -- her symptoms are currently once per week -- if increasing in frequency or severity, then follow up, or call for UGI -- she deferred today. Encouraged patient to schedule Medicare Wellness.   o Chronic conditions reviewed and taken into consideration for today's treatment plan.  · Disposition  o Call or Return if symptoms worsen or persist.            Electronically Signed by: BETSY Thomas -Author on July 9, 2019 10:47:19 AM

## 2021-05-07 NOTE — PROGRESS NOTES
Progress Note      Patient Name: Deanne Pittman   Patient ID: [id number]   Sex: Female   YOB: 1945    Primary Care Provider: Huyen MEYER   Referring Provider: Huyen MEYER    Visit Date: September 17, 2019    Provider: BETSY Thomas   Location: [facility]   Location Address: [address] [place] [address]   [place]  [address]   Location Phone: [phone]          Chief Complaint     feet and legs are swelling       History Of Present Illness  Deanne Pittman is a 73 year old /White female who presents for evaluation and treatment of:      follow up on LE edema -- she last saw me in July for the c/o -- by the time she made it here the swelling had resolved. It has most recently reoccurred in the past few weeks -- she initially thought it was the heat --no pain with walking, but she does feel heat off of the top of her feet -- she does not feel like they turn red. The right is worse than left. She does get some FROST -- No chest pain, palpitations.       Past Medical History  Disease Name Date Onset Notes   Cardiomyopathy --  --    Hyperlipidemia --  --    Hypertension --  --          Past Surgical History  Procedure Name Date Notes   Colostomy --  --    Colostomy Reversal 1995 --    Hysterectomy --  --    Liver Surgery 1974 --    Lumpectomy of left breast --  --          Medication List  Name Date Started Instructions   atorvastatin 20 mg oral tablet 06/11/2019 take 1 tablet (20 mg) by oral route once daily at bedtime   carvedilol 6.25 mg oral tablet  take 1 tablet (6.25 mg) by oral route 2 times per day with food   losartan 25 mg oral tablet  take 1 tablet (25 mg) by oral route once daily         Allergy List  Allergen Name Date Reaction Notes   PENICILLINS --  --  --          Family Medical History  Disease Name Relative/Age Notes   Breast Neoplasm, Malignant Sister/   --    Rectal Neoplasm, Malignant Sister/   --    Lupus Erythematosus Sister/   --     Skin Carcinoma Mother/   --          Social History  Finding Status Start/Stop Quantity Notes   Tobacco Former --/-- .5ppd  quit Jan 2019         Immunizations  NameDate Admin Mfg Trade Name Lot Number Route Inj VIS Given VIS Publication   HepA02/26/2019 MSD VAQTA-ADULT [id number]  LA 02/26/2019 07/20/2016   Comments: 7558-1191-04   HepA08/14/2018 SKB HAVRIX-ADULT [id number] IM RA 08/14/2018 07/20/2016   Comments: 65413-068-85   Druihniwi3613/10/2018 MSD PNEUMOVAX 23 [id number] IM LA 12/10/2018 04/24/2015   Comments: NDC 8691-0919-01   Aneuivv2728/08/2017 WAL PREVNAR 13 [id number] IM UKN 08/14/2018 11/05/2015   Comments:    Tdap01/18/2019 PMC BOOSTRIX GA5Z5 IM LA 01/18/2019 02/24/2015   Comments: NDC 68720-177-74         Review of Systems  · Constitutional  o Admits  o : good general health lately  o Denies  o : fatigue, fever, chills  · Cardiovascular  o Admits  o : dyspnea on exertion, lower extremity edema, varicosities  o Denies  o : chest pain, syncope, lightheadedness, palpitations  · Respiratory  o Denies  o : wheezing, cough  · Genitourinary  o Denies  o : dysuria, urinary retention, difficulty voiding  · Integument  o Admits  o : pigmentation changes  o Denies  o : rash, itching  · Neurologic  o Denies  o : tingling or numbness  · Musculoskeletal  o Admits  o : BLE discomfort with swelling  o Denies  o : joint pain, joint swelling      Vitals  Date Time BP Position Site L\R Cuff Size HR RR TEMP (F) WT  HT  BMI kg/m2 BSA m2 O2 Sat        09/17/2019 08:20 /80 Sitting    92 - R  98.2 143lbs 0oz    98 %          Physical Examination  · Constitutional  o Appearance  o : well developed, well-nourished, in no acute distress  · Eyes  o Conjunctivae  o : conjunctivae normal, no exudates present  · Neck  o Inspection/Palpation  o : normal appearance, no masses or tenderness, trachea midline  o Thyroid  o : no thyromegaly  · Respiratory  o Respiratory Effort  o : breathing unlabored  o Auscultation of Lungs  o : clear to  ascultation  · Cardiovascular  o Heart  o :   § Auscultation of Heart  § : regular rate, normal rhythm, no murmurs present  o Peripheral Vascular System  o :   § Carotid Arteries  § : normal pulses bilaterally, no bruits present  § Pedal Pulses  § : bilateral pulse strength 2+  § Extremities  § : mild lower extremity edema present, no cyanosis, distal hair loss present in legs, no purpura present, normal capillary refill, varicosities present on lower extremities   · Lymphatic  o Neck  o : no lymphadenopathy present  · Musculoskeletal  o General  o :   § General Musculoskeletal  § : Muscle tone, strength, and development grossly normal.  · Skin and Subcutaneous Tissue  o General Inspection  o : no lesions present, no areas of discoloration, skin turgor normal, texture normal  · Neurologic  o Gait and Station  o :   § Gait Screening  § : normal gait  · Psychiatric  o Mood and Affect  o : mood normal, affect appropriate          Assessment  · Hyperlipidemia     272.4/E78.5  · Lower extremity edema     782.3/R60.0  · Cardiomyopathy     425.8/I43  · Hypertension     401.9/I10  · Varicosities of leg     454.9/I83.90  · Dyspnea on exertion     786.09/R06.09    Problems Reconciled  Plan  · Orders  o CMP [facility] (92224) - 782.3/R60.0, 425.8/I43, 401.9/I10, 272.4/E78.5 - 09/17/2019  o ACO-39: Current medications updated and reviewed () - - 09/17/2019  o Doppler ultrasound of vein of both lower extremities (25255) - 782.3/R60.0, 401.9/I10, 454.9/I83.90 - 09/17/2019  o BNP (brain natriuretic peptide measurement) (40241) - 782.3/R60.0, 425.8/I43, 401.9/I10, 786.09/R06.09 - 09/17/2019  o B12 Folate levels (B12FO) - 782.3/R60.0, 425.8/I43, 401.9/I10 - 09/17/2019  · Medications  o Medications have been Reconciled  o Transition of Care or Provider Policy  · Instructions  o Patient was educated/instructed on their diagnosis, treatment and medications prior to discharge from the clinic today.  · Disposition  o Call or Return if  symptoms worsen or persist.            Electronically Signed by: BETSY Thomas -Author on September 17, 2019 09:17:57 AM

## 2021-05-07 NOTE — PROGRESS NOTES
Progress Note      Patient Name: Deanne Pittman   Patient ID: [id number]   Sex: Female   YOB: 1945    Primary Care Provider: Huyen MEYER   Referring Provider: Huyen MEYER    Visit Date: April 24, 2019    Provider: BETSY Thomas   Location: [facility]   Location Address: [address] [place] [address]  [id number]   Location Phone: [phone]          Chief Complaint     needs an order for a mammogram       History Of Present Illness  Deanne Pittman is a 73 year old /White female who presents for evaluation and treatment of:      visit for screening mammogram -- she last had a left diagnostic mammogram in March 2018 d/t abnormal mammogram in 2017 -- microcalcifications noted -- advised follow up screening mammogram annually. She has a history of left breast biopsy -- several years ago -- Dr. Su did that at [facility] and it was benign.     She denies any c/o of breast pain, tenderness, or lumps -- no nipple discharge.       Past Medical History  Disease Name Date Onset Notes   Cardiomyopathy --  --    Hyperlipidemia --  --    Hypertension --  --          Past Surgical History  Procedure Name Date Notes   Colostomy --  --    Colostomy Reversal 1995 --    Hysterectomy --  --    Liver Surgery 1974 --    Lumpectomy of left breast --  --          Medication List  Name Date Started Instructions   atorvastatin 20 mg oral tablet  take 1 tablet (20 mg) by oral route once daily at bedtime   carvedilol 6.25 mg oral tablet  take 1 tablet (6.25 mg) by oral route 2 times per day with food   losartan 25 mg oral tablet  take 1 tablet (25 mg) by oral route once daily         Allergy List  Allergen Name Date Reaction Notes   PENICILLINS --  --  --          Family Medical History  Disease Name Relative/Age Notes   Breast Neoplasm, Malignant Sister/   --    Rectal Neoplasm, Malignant Sister/   --    Lupus Erythematosus Sister/   --    Skin Carcinoma Mother/   --           Social History  Finding Status Start/Stop Quantity Notes   Tobacco Current every day --/-- 1/2 pack --          Immunizations  NameDate Admin Mfg Trade Name Lot Number Route Inj VIS Given VIS Publication   HepA02/26/2019 MSD VAQTA-ADULT [id number] IM LA 02/26/2019 07/20/2016   Comments: 3997-1610-54   HepA08/14/2018 SKB HAVRIX-ADULT [id number] IM RA 08/14/2018 07/20/2016   Comments: 22947-435-48   Blndmrajg7559/10/2018 MSD PNEUMOVAX 23 [id number] IM LA 12/10/2018 04/24/2015   Comments: NDC 5810-0480-39   Ivhzejp9758/08/2017 WAL PREVNAR 13 [id number] IM UKN 08/14/2018 11/05/2015   Comments:    Tdap01/18/2019 PMC BOOSTRIX GA5Z5 IM LA 01/18/2019 02/24/2015   Comments: NDC 90634-802-66         Review of Systems  · Constitutional  o Admits  o : good general health lately  · Breasts  o Denies  o : lumps, tenderness, swelling, nipple discharge  · Cardiovascular  o Denies  o : chest pain, palpitations  · Respiratory  o Denies  o : shortness of breath, cough  · Gastrointestinal  o Denies  o : nausea, vomiting, diarrhea, abdominal pain      Vitals  Date Time BP Position Site L\R Cuff Size HR RR TEMP (F) WT  HT  BMI kg/m2 BSA m2 O2 Sat        04/24/2019 08:31 /80 Sitting    93 - R  96.8 138lbs 0oz    99 %          Physical Examination  · Constitutional  o Appearance  o : well developed, well-nourished, in no acute distress  · Respiratory  o Respiratory Effort  o : breathing unlabored  o Auscultation of Lungs  o : clear to ascultation  · Cardiovascular  o Heart  o :   § Auscultation of Heart  § : regular rate and rhythm  o Peripheral Vascular System  o :   § Extremities  § : no edema  · Breasts  o Inspection of Breasts  o : developmental state normal for age, breasts symmetrical, no skin changes, no discharge present, nipples inverted   o Palpation of Breasts, Axillae  o : no masses present on palpation, no breast tenderness  · Lymphatic  o Neck  o : no lymphadenopathy present  · Musculoskeletal  o General  o :   § General  Musculoskeletal  § : Muscle tone, strength, and development grossly normal.  · Skin and Subcutaneous Tissue  o General Inspection  o : no lesions present, no areas of discoloration, skin turgor normal, texture normal  · Neurologic  o Gait and Station  o :   § Gait Screening  § : normal gait  · Psychiatric  o Mood and Affect  o : mood normal, affect appropriate          Assessment  · Visit for screening mammogram     V76.12/Z12.31      Plan  · Orders  o Screening Mammogram 3D Bilateral (08818, 08380, ) - V76.12/Z12.31 - 04/24/2019   prefers the hospital  o ACO-39: Current medications updated and reviewed () - - 04/24/2019  · Instructions  o Patient was educated/instructed on their diagnosis, treatment and medications prior to discharge from the clinic today.  · Disposition  o Call or Return if symptoms worsen or persist.            Electronically Signed by: BETSY Thomas -Author on April 24, 2019 08:52:28 AM

## 2021-05-07 NOTE — PROGRESS NOTES
Progress Note      Patient Name: Deanne Pittman   Patient ID: [id number]   Sex: Female   YOB: 1945    Primary Care Provider: Huyen MEYER   Referring Provider: Huyen MEYER    Visit Date: December 13, 2018    Provider: BETSY Thomas   Location: [facility]   Location Address: [address] [place] [address]  [id number]   Location Phone: [phone]          Chief Complaint     sinus congestion and pressure  chest congestion       History Of Present Illness  Deanne Pittman is a 73 year old /White female who presents for evaluation and treatment of:      not feeling well.     She hasn't felt well for the past week--started last weekend--it is especially her head--she blows her nose constantly during the day, and then at night she can't breathe. She has not had a sore throat or ear pain. She is coughing as of the past few days, so now it is moving down into her chest--her chest doesn't hurt, but she can feel when she coughs. She is wheezing and short of breath. No nausea, vomiting, or diarrhea. No abdominal pain. No fever.                Past Medical History  Disease Name Date Onset Notes   Cardiomyopathy --  --    Hyperlipidemia --  --    Hypertension --  --          Past Surgical History  Procedure Name Date Notes   Colostomy --  --    Colostomy Reversal 1995 --    Hysterectomy --  --    Liver Surgery 1974 --    Lumpectomy of left breast --  --          Medication List  Name Date Started Instructions   B Complex-Vitamin B12 oral tablet  take 1 tablet by oral route daily   carvedilol 6.25 mg oral tablet  take 0.5 tablet by oral route 2 times a day for 90 days   losartan 100 mg oral tablet  take 1 tablet (100 mg) by oral route once daily for 90 days   Shingrix (PF) 50 mcg/0.5 mL intramuscular suspension for reconstitution 08/14/2018 inject 0.5 milliliter (50 mcg) by intramuscular route once repeat 0.5 mL dose 2 to 6 months after the first dose  (total of 2 doses)   spironolactone 25 mg oral tablet  take 1 tablet (25 mg) by oral route once daily for 90 days   Vitamin D3 oral  --          Allergy List  Allergen Name Date Reaction Notes   PENICILLINS --  --  --          Family Medical History  Disease Name Relative/Age Notes   Breast Neoplasm, Malignant / --     Sister/    Lupus Erythematosus / --     Sister/    Rectal Neoplasm, Malignant / --     Sister/    Skin Carcinoma / --     Mother/          Social History  Finding Status Start/Stop Quantity Notes   Tobacco Current every day --/-- 1/2 pack --          Immunizations  NameDate Admin Mfg Trade Name Lot Number Route Inj VIS Given VIS Publication   HepA08/14/2018 SKB Havrix Adult [id number] IM RA 08/14/2018 07/20/2016   Comments: 15053-423-51   Ncdlqxias7244/10/2018 MSD Pneumovax 23 [id number] IM LA 12/10/2018 04/24/2015   Comments: NDC 6622-9731-40   Dgsivzm4497/08/2017 WAL Prevnar 13 [id number] IM UKN 08/14/2018 11/05/2015   Comments:          Review of Systems  · Constitutional  o Admits  o : fatigue  o Denies  o : fever, chills  · Eyes  o Denies  o : discharge from eye  · HENT  o Admits  o : nasal congestion, nasal discharge, postnasal drip  o Denies  o : headaches, sinus pain, sore throat, tinnitus, ear pain, ear fullness  · Cardiovascular  o Denies  o : chest pain, palpitations  · Respiratory  o Admits  o : shortness of breath, wheezing, cough  · Gastrointestinal  o Denies  o : nausea, vomiting, diarrhea, abdominal pain  · Integument  o Denies  o : pigmentation changes      Vitals  Date Time BP Position Site L\R Cuff Size HR RR TEMP(F) WT  HT  BMI kg/m2 BSA m2 O2 Sat        12/13/2018 11:24 /80 Sitting    85 - R  97.2 132lbs 0oz    99 %           Physical Examination  · Constitutional  o Appearance  o : well developed, well-nourished, in no acute distress  · Eyes  o Conjunctivae  o : conjunctivae normal, no exudates present  o Pupils and Irises  o : pupils equal and round, pupils reactive to light  bilaterally  · Ears, Nose, Mouth and Throat  o Ears  o :   § External Ears  § : auricle appearance normal bilaterally, no auricle tenderness to palpation present, external auditory canal appearance within normal limits  § Otoscopic Examination  § : tympanic membrane appearance within normal limits bilaterally  § Hearing  § : response to sound normal, no tinnitus  o Nose  o :   § External Nose  § : appearance normal  § Intranasal Exam  § : mucosa within normal limits, sinuses non tender to percussion  o Throat  o :   § Oropharynx  § : hyperemia noted, tonsils within normal limits  · Neck  o Inspection/Palpation  o : supple  · Respiratory  o Respiratory Effort  o : breathing unlabored  o Auscultation of Lungs  o : CTA anterior, CTA posterior except in upper lobes bilaterally  · Cardiovascular  o Heart  o :   § Auscultation of Heart  § : regular rate and rhythm  o Peripheral Vascular System  o :   § Extremities  § : no edema  · Gastrointestinal  o Abdominal Examination  o :   § Abdomen  § : soft, non-tender, non-distended, bowel sounds +  · Lymphatic  o Neck  o : no lymphadenopathy present  · Musculoskeletal  o General  o :   § General Musculoskeletal  § : Muscle tone, strength, and development grossly normal.  · Skin and Subcutaneous Tissue  o General Inspection  o : no lesions present, no areas of discoloration, skin turgor normal, texture normal  · Neurologic  o Gait and Station  o :   § Gait Screening  § : normal gait  · Psychiatric  o Mood and Affect  o : mood normal, affect appropriate          Assessment  · Upper respiratory infection with cough and congestion     465.9/J06.9      Plan  · Orders  o ACO-39: Current medications updated and reviewed () - - 12/13/2018  · Medications  o azithromycin 250 mg oral tablet   SIG: take 2 tablets (500 mg) by oral route once daily for 1 day then 1 tablet (250 mg) by oral route once daily for 4 days   DISP: (6) tablets with 0 refills  Prescribed on 12/13/2018      o Medrol (Isiah) 4 mg oral tablets,dose pack   SIG: take as directed for 6 days   DISP: (1) 21 ct dose-pack with 0 refills  Prescribed on 12/13/2018     o guaifenesin 600 mg oral tablet extended release 12hr   SIG: take 1 tablet (600 mg) by oral route every 12 hours as needed for 10 days   DISP: (20) Tablet extended release 12hrs with 0 refills  Prescribed on 12/13/2018     · Instructions  o Take all medications as prescribed/directed.  o Rest. Increase Fluids.  o Patient was educated/instructed on their diagnosis, treatment and medications prior to discharge from the clinic today.  · Disposition  o Call or Return if symptoms worsen or persist.            Electronically Signed by: BETSY Thomas -Author on December 13, 2018 12:03:49 PM

## 2021-05-07 NOTE — PROGRESS NOTES
Progress Note      Patient Name: Deanne Pittman   Patient ID: [id number]   Sex: Female   YOB: 1945    Primary Care Provider: Huyen MEYER   Referring Provider: Huyen MEYER    Visit Date: September 24, 2019    Provider: BETSY Thomas   Location: [facility]   Location Address: [address] [place] [address]   [place]  [address]   Location Phone: [phone]          Chief Complaint     sinus pressure, congestion, HA       History Of Present Illness  Deanne Pittman is a 73 year old /White female who presents for evaluation and treatment of:      sinus pressure and drainage; she can hardly breathe -- her right ear is bothering her -- started last week, but worse over the weekend -- especially Friday and Saturday -- she is using Flonase and having to prop herself up on 5 pillows just to breathe. Her throat doesn't feel sore, but she feels a heaviness/congestion in her upper chest. Friday/Saturday she had a headache so bad she could hardly function.       Past Medical History  Disease Name Date Onset Notes   Cardiomyopathy --  --    Hyperlipidemia --  --    Hypertension --  --          Past Surgical History  Procedure Name Date Notes   Colostomy --  --    Colostomy Reversal 1995 --    Hysterectomy --  --    Liver Surgery 1974 --    Lumpectomy of left breast --  --          Medication List  Name Date Started Instructions   atorvastatin 20 mg oral tablet 06/11/2019 take 1 tablet (20 mg) by oral route once daily at bedtime   carvedilol 6.25 mg oral tablet  take 1 tablet (6.25 mg) by oral route 2 times per day with food   losartan 25 mg oral tablet  take 1 tablet (25 mg) by oral route once daily         Allergy List  Allergen Name Date Reaction Notes   PENICILLINS --  --  --          Family Medical History  Disease Name Relative/Age Notes   Breast Neoplasm, Malignant Sister/   --    Rectal Neoplasm, Malignant Sister/   --    Lupus Erythematosus Sister/   --     Skin Carcinoma Mother/   --          Social History  Finding Status Start/Stop Quantity Notes   Tobacco Former --/-- .5ppd  quit Jan 2019         Immunizations  NameDate Admin Mfg Trade Name Lot Number Route Inj VIS Given VIS Publication   HepA02/26/2019 MSD VAQTA-ADULT [id number] [facility] 02/26/2019 07/20/2016   Comments: 5258-7249-23   HepA08/14/2018 SKB HAVRIX-ADULT [id number] IM RA 08/14/2018 07/20/2016   Comments: 05972-999-51   Vzfrzqfvg8856/10/2018 MSD PNEUMOVAX 23 [id number]  LA 12/10/2018 04/24/2015   Comments: NDC 4393-5494-14   Mziusqj6077/08/2017 WAL PREVNAR 13 [id number]  UKN 08/14/2018 11/05/2015   Comments:    Tdap01/18/2019 PMC BOOSTRIX GA5Z5  LA 01/18/2019 02/24/2015   Comments: NDC 12070-040-43         Review of Systems  · Constitutional  o Admits  o : fatigue  o Denies  o : fever, chills  · Eyes  o Denies  o : discharge from eye  · HENT  o Admits  o : headaches, sinus pain, nasal congestion, nasal discharge, ear fullness  o Denies  o : sore throat, tinnitus, ear pain  · Cardiovascular  o Denies  o : chest pain, syncope, palpitations  · Respiratory  o Admits  o : cough, hoarseness  o Denies  o : shortness of breath, wheezing  · Gastrointestinal  o Denies  o : nausea, vomiting, diarrhea, abdominal pain  · Integument  o Denies  o : pigmentation changes  · Neurologic  o Admits  o : dizziness      Vitals  Date Time BP Position Site L\R Cuff Size HR RR TEMP (F) WT  HT  BMI kg/m2 BSA m2 O2 Sat HC       09/24/2019 11:24 /70 Sitting    80 - R  97.6 144lbs 0oz    98 %          Physical Examination  · Constitutional  o Appearance  o : well developed, well-nourished, in no acute distress  · Eyes  o Conjunctivae  o : conjunctivae normal, no exudates present  · Ears, Nose, Mouth and Throat  o Ears  o :   § External Ears  § : auricle appearance normal bilaterally, no auricle tenderness to palpation present, external auditory canal appearance within normal limits, no discharge present  § Otoscopic Examination  § :  tympanic membrane appearance within normal limits bilaterally  § Hearing  § : response to sound normal, no tinnitus  o Nose  o :   § External Nose  § : appearance normal  § Intranasal Exam  § : mucosa within normal limits, frontal sinus tenderness to percussion   o Throat  o :   § Oropharynx  § : nasopharyngeal discharge noted, tonsils within normal limits  · Neck  o Inspection/Palpation  o : supple  · Respiratory  o Respiratory Effort  o : breathing unlabored  o Auscultation of Lungs  o : clear to ascultation  · Cardiovascular  o Heart  o :   § Auscultation of Heart  § : regular rate and rhythm  o Peripheral Vascular System  o :   § Extremities  § : no edema  · Lymphatic  o Neck  o : no lymphadenopathy present  · Musculoskeletal  o General  o :   § General Musculoskeletal  § : Muscle tone, strength, and development grossly normal.  · Skin and Subcutaneous Tissue  o General Inspection  o : no lesions present, no areas of discoloration, skin turgor normal, texture normal  · Neurologic  o Gait and Station  o :   § Gait Screening  § : normal gait  · Psychiatric  o Mood and Affect  o : mood normal, affect appropriate          Assessment  · Sinusitis, acute     461.9/J01.90    Problems Reconciled  Plan  · Orders  o ACO-39: Current medications updated and reviewed () - - 09/24/2019  · Medications  o montelukast 10 mg oral tablet   SIG: take 1 tablet (10 mg) by oral route once daily in the evening   DISP: (30) tablets with 1 refills  Prescribed on 09/24/2019     o clindamycin HCl 300 mg oral capsule   SIG: take 1 capsule (300 mg) by oral route every 6 hours for 7 days   DISP: (28) capsules with 0 refills  Prescribed on 09/24/2019     o Medications have been Reconciled  o Transition of Care or Provider Policy  · Instructions  o Take all medications as prescribed/directed.  o Patient was educated/instructed on their diagnosis, treatment and medications prior to discharge from the clinic today.  · Disposition  o Call or  Return if symptoms worsen or persist.            Electronically Signed by: BETSY Thomas -Author on September 24, 2019 11:57:52 AM

## 2021-05-07 NOTE — PROGRESS NOTES
Progress Note      Patient Name: Deanne Pittman   Patient ID: [id number]   Sex: Female   YOB: 1945        Visit Date: January 30, 2018    Provider: BETSY Thomas   Location: [facility]   Location Address: [address] [place] [address]  [id number]   Location Phone: [phone]          Chief Complaint     cough and congestion  sinus drainage and pressure       History Of Present Illness  Deanne Pittman is a 72 year old /White female who presents for evaluation and treatment of:      sinus and chest congestion. She started out with sinus pressure and drainage about a week ago. Now it has moved down into her chest and she is coughing a lot. She isn't coughing a whole lot up. It feels like she can't get it loose. She denies fever. She has had ear fullness and pressure. No sore throat; but hoarseness. No nausea, vomiting, or diarrhea.     She did have her flu shot this year. She also got a pneumonia shot this year--Prevnar 13.       Past Medical History  Disease Name Date Onset Notes   Cardiomyopathy --  --    Hyperlipidemia --  --    Hypertension --  --          Past Surgical History  Procedure Name Date Notes   Colostomy --  --    Colostomy Reversal 1995 --    Hysterectomy --  --    Liver Surgery 1974 --    Lumpectomy of left breast --  --          Medication List  Name Date Started Instructions   atorvastatin 20 mg oral tablet  take 1 tablet (20 mg) by oral route once daily for 90 days   B Complex-Vitamin B12 oral tablet  take 1 tablet by oral route daily   carvedilol 6.25 mg oral tablet  take 0.5 tablet by oral route 2 times a day for 90 days   losartan 100 mg oral tablet  take 1 tablet (100 mg) by oral route once daily for 90 days   spironolactone 25 mg oral tablet  take 1 tablet (25 mg) by oral route once daily for 90 days   Vitamin C 1,000 mg oral tablet  --    Vitamin D3 oral  --          Allergy List  Allergen Name Date Reaction Notes   PENICILLINS --   "--  --          Family Medical History  Disease Name Relative/Age Notes   Breast Neoplasm, Malignant / --     Sister/    Lupus Erythematosus / --     Sister/    Rectal Neoplasm, Malignant / --     Sister/    Skin Carcinoma / --     Mother/          Social History  Finding Status Start/Stop Quantity Notes   Tobacco Current every day --/-- 1/2 pack --          Vitals  Date Time BP Position Site L\R Cuff Size HR RR TEMP(F) WT  HT  BMI kg/m2 BSA m2 O2 Sat HC       01/30/2018 08:23 /60 Sitting    106 - R  97.5 131lbs 0oz 5'  2\" 23.96 1.61 98 %           Physical Examination  · Constitutional  o Appearance  o : well developed, well-nourished, in no acute distress  · Eyes  o Conjunctivae  o : conjunctivae normal, no exudates present  o Pupils and Irises  o : pupils equal and round, pupils reactive to light bilaterally  · Ears, Nose, Mouth and Throat  o Ears  o :   § External Ears  § : auricle appearance normal bilaterally, no auricle tenderness to palpation present, external auditory canal appearance within normal limits  § Otoscopic Examination  § : tympanic membrane appearance within normal limits bilaterally  § Hearing  § : response to sound normal, no tinnitus  o Nose  o :   § External Nose  § : appearance normal  § Intranasal Exam  § : mucosa within normal limits, sinuses non tender to percussion  o Oral Cavity  o :   § Oral Mucosa  § : oral mucosa normal  § Lips  § : lip appearance normal  § Teeth  § : normal dentition for age  § Gums  § : gums pink, non-swollen, no bleeding present  § Tongue  § : tongue appearance normal  § Palate  § : hard palate normal, soft palate appearance normal  o Throat  o :   § Oropharynx  § : generalized hyperemia noted, tonsils within normal limits  · Neck  o Inspection/Palpation  o : normal appearance, no masses or tenderness, trachea midline  · Respiratory  o Respiratory Effort  o : breathing unlabored  o Auscultation of Lungs  o : mild, intermittent expiratory wheezing; otherwise " clear  · Cardiovascular  o Heart  o :   § Auscultation of Heart  § : regular rate and rhythm  o Peripheral Vascular System  o :   § Extremities  § : no edema  · Lymphatic  o Neck  o : no lymphadenopathy present  · Musculoskeletal  o General  o :   § General Musculoskeletal  § : No joint swelling or deformity., Muscle tone, strength, and development grossly normal.  · Skin and Subcutaneous Tissue  o General Inspection  o : no lesions present, no areas of discoloration, skin turgor normal, texture normal  · Neurologic  o Gait and Station  o :   § Gait Screening  § : normal gait  · Psychiatric  o Mood and Affect  o : mood normal, affect appropriate              Assessment  · Bronchitis, acute     466.0/J20.9  · Nicotine dependence     305.1/F17.200  · Tobacco abuse counseling       Tobacco abuse counseling     V65.42/Z71.6  · Upper respiratory infection     465.9/J06.9  · History of lumpectomy of left breast     V45.89/Z90.12  · Family history of breast cancer     V16.3/Z80.3  · Microcalcification of left breast on mammogram     793.81/R92.0      Plan  · Orders  o Smoking cessation counseling, 3-10 minutes [facility] (22080) - V65.42/Z71.6 - 01/30/2018  o ACO-17: Screened for tobacco use AND received tobacco cessation intervention (4004F) - V65.42/Z71.6 - 01/30/2018  o ACO-39: Current medications updated and reviewed () - - 01/30/2018  o ACO-15: Pneumococcal Vaccine Administered or Previously Received (4040F) - - 01/30/2018   Already received  o Influenza immunization was not ordered or administered for reasons documented by clinician () - - 01/30/2018   Already received  o ACO-20: Screening Mammography documented and reviewed (3014F) - 793.81/R92.0 - 01/30/2018   Last mammogram September 6, 2017 at [facility]--repeat diagnostic mammo rec'd in 6 months d/t microcalcifications in the left lateral breast  o ACO-13: Fall Risk Screening with no falls in past year or only one fall without injury in the past year (1101F) - -  01/30/2018  o Mammogram diagnostic 2D breast unilateral LEFT (w/US if needed) [facility]. (91305, -IX) - V45.89/Z90.12, V16.3/Z80.3, 793.81/R92.0 - 03/06/2018  · Medications  o Nicoderm CQ 14 mg/24 hr transdermal patch 24 hour   SIG: apply 1 patch (14 mg) by transdermal route once daily for 4 weeks   DISP: (28) patches with 1 refills  Prescribed on 01/30/2018     o azithromycin 250 mg oral tablet   SIG: take 2 tablets (500 mg) by oral route once daily for 1 day then 1 tablet (250 mg) by oral route once daily for 4 days   DISP: (6) tablets with 0 refills  Prescribed on 01/30/2018     o Medrol (Isiah) 4 mg oral tablets,dose pack   SIG: take as directed for 6 days   DISP: (1) 21 ct dose-pack with 0 refills  Prescribed on 01/30/2018     · Instructions  o *Form of nicotine being used: cigarettes--1/2 pack per day  o Patient was strongly encouraged to discontinue use of any nicotine containing product or minimize the use of the product.  o Tobacco and smoking cessation counseling for more than 3 minutes was completed.  o Take all medications as prescribed/directed.  o Rest. Increase Fluids.  o Patient was educated/instructed on their diagnosis, treatment and medications prior to discharge from the clinic today.  o Chronic conditions reviewed and taken into consideration for today's treatment plan.  · Disposition  o Call or Return if symptoms worsen or persist.            Electronically Signed by: BETSY Thomas -Author on January 30, 2018 09:45:37 AM

## 2021-05-07 NOTE — PROGRESS NOTES
Progress Note      Patient Name: Deanne Pittman   Patient ID: [id number]   Sex: Female   YOB: 1945    Primary Care Provider: Huyen MEYER   Referring Provider: Huyen MEYER    Visit Date: December 31, 2019    Provider: BETSY Thomas   Location: [facility]   Location Address: [address] [place] [address] [place]  [address]   Location Phone: [phone]          Chief Complaint     refills and lab work       History Of Present Illness  Deanne Pittman is a 74 year old /White female who presents for evaluation and treatment of:      follow up on cholesterol.     She is fasting. No c/o myalgia with medications. She continues to see Dr. Correa every 6 months.     She has been to see Dr. Alarcon for her legs -he put her in compression stockings - she states whether she was wearing them or not her legs hurt/ache - she decided to take them off since Christmas and the aching has stopped - she is not having any swelling - she wonders if she should wear them all of the time if they are not swelling -       Past Medical History  Disease Name Date Onset Notes   Cardiomyopathy --  --    Hyperlipidemia --  --    Hypertension --  --          Past Surgical History  Procedure Name Date Notes   Colostomy --  --    Colostomy Reversal 1995 --    Hysterectomy --  --    Liver Surgery 1974 --    Lumpectomy of left breast --  --          Medication List  Name Date Started Instructions   atorvastatin 20 mg oral tablet 12/31/2019 take 1 tablet (20 mg) by oral route once daily at bedtime   carvedilol 6.25 mg oral tablet  take 1 tablet (6.25 mg) by oral route 2 times per day with food   Lasix 20 mg oral tablet  take 1 tablet (20 mg) by oral route once daily PRN LE swelling, per Dr. Alex Cabrera   losartan 25 mg oral tablet  take 1 tablet (25 mg) by oral route once daily   vitamin B12-folic acid 500-400 mcg oral tablet  --          Allergy List  Allergen Name Date Reaction Notes    PENICILLINS --  --  --          Family Medical History  Disease Name Relative/Age Notes   Breast Neoplasm, Malignant Sister/   --    Rectal Neoplasm, Malignant Sister/   --    Lupus Erythematosus Sister/   --    Skin Carcinoma Mother/   --          Social History  Finding Status Start/Stop Quantity Notes   Tobacco Former --/-- .5ppd  quit Jan 2019         Immunizations  NameDate Admin Mfg Trade Name Lot Number Route Inj VIS Given VIS Publication   HepA02/26/2019 MSD VAQTA-ADULT [id number]  [place] 02/26/2019 07/20/2016   Comments: 3023-5622-14   HepA08/14/2018 SKB HAVRIX-ADULT [id number]  RA 08/14/2018 07/20/2016   Comments: 12000-580-34   Ktwdinrpv8867/10/2018 MSD PNEUMOVAX 23 [id number]  [place] 12/10/2018 04/24/2015   Comments: NDC 9305-5216-74   Svcilyb2815/08/2017 WAL PREVNAR 13 [id number]  UKN 08/14/2018 11/05/2015   Comments:    Tdap01/18/2019 PMC BOOSTRIX [id number]  [place] 01/18/2019 02/24/2015   Comments: NDC 69537-545-60         Review of Systems  · Constitutional  o Admits  o : good general health lately  · Cardiovascular  o Denies  o : chest pain, syncope, dyspnea on exertion, lower extremity edema, palpitations  · Respiratory  o Denies  o : shortness of breath  · Gastrointestinal  o Denies  o : nausea, vomiting, diarrhea, abdominal pain  · Integument  o Denies  o : pigmentation changes  · Musculoskeletal  o Denies  o : muscle pain, muscle cramps      Vitals  Date Time BP Position Site L\R Cuff Size HR RR TEMP (F) WT  HT  BMI kg/m2 BSA m2 O2 Sat        12/31/2019 08:53 /80 Sitting    87 - R   146lbs 0oz    99 %          Physical Examination  · Constitutional  o Appearance  o : well developed, well-nourished, in no acute distress  · Eyes  o Conjunctivae  o : conjunctivae normal, no exudates present  · Neck  o Inspection/Palpation  o : normal appearance, no masses or tenderness, trachea midline  o Thyroid  o : no thyromegaly  · Respiratory  o Respiratory Effort  o : breathing unlabored  o Auscultation of Lungs  o :  clear to ascultation  · Cardiovascular  o Heart  o :   § Auscultation of Heart  § : regular rate, normal rhythm, no murmurs present  o Peripheral Vascular System  o :   § Carotid Arteries  § : normal pulses bilaterally, no bruits present  § Pedal Pulses  § : bilateral pulse strength 2+  § Extremities  § : no edema - she is wearing compression stockings today  · Lymphatic  o Neck  o : no lymphadenopathy present  · Musculoskeletal  o General  o :   § General Musculoskeletal  § : Muscle tone, strength, and development grossly normal.  · Skin and Subcutaneous Tissue  o General Inspection  o : on the right flank region, just under the lower portion of her bra, there is a raised lesion - this measures 1.5cm by 0.9cm - the center is a speckled brown color and the exterior ring is flesh-colored  · Neurologic  o Gait and Station  o :   § Gait Screening  § : normal gait  · Psychiatric  o Mood and Affect  o : mood normal, affect appropriate          Assessment  · Hyperlipidemia     272.4/E78.5  · Cardiomyopathy     425.8/I43  · Hypertension     401.9/I10  · Stage III chronic kidney disease     585.3/N18.3  · Chronic venous insufficiency of lower extremity     459.81/I87.2  · Skin lesion     709.9/L98.9    Problems Reconciled  Plan  · Orders  o CMP [facility] (26958) - 272.4/E78.5, 425.8/I43, 401.9/I10, 585.3/N18.3 - 12/31/2019  o Lipid Panel [facility] (59474) - 272.4/E78.5, 401.9/I10 - 12/31/2019  o ACO-39: Current medications updated and reviewed () - - 12/31/2019  · Medications  o atorvastatin 20 mg oral tablet   SIG: take 1 tablet (20 mg) by oral route once daily at bedtime   DISP: (90) tablets with 1 refills  Refilled on 12/31/2019     o Medications have been Reconciled  o Transition of Care or Provider Policy  · Instructions  o Take all medications as prescribed/directed.  o Patient was educated/instructed on their diagnosis, treatment and medications prior to discharge from the clinic today. Will repeat labs today - will gets  labs from October from Dr. Alex Cabrera - okay to wear compression stockings PRN so long as swelling is under control and pain is okay. I have given her the number to Ms. Ochoa -dermatology- she is established there. She is going to call for an appointment, but not until March d/t weather.   o Chronic conditions reviewed and taken into consideration for today's treatment plan.  · Disposition  o Call or Return if symptoms worsen or persist.            Electronically Signed by: BETSY Thomas -Author on December 31, 2019 09:50:10 AM

## 2021-05-07 NOTE — PROGRESS NOTES
Progress Note      Patient Name: Deanne Pittman   Patient ID: [id number]   Sex: Female   YOB: 1945    Primary Care Provider: Huyen MEYER   Referring Provider: Huyen MEYER    Visit Date: June 12, 2020    Provider: BETSY Thomas   Location: [facility]   Location Address: [address] [place] [address] [place]  [address]   Location Phone: [phone]          Chief Complaint     REFILLS AND LABS       History Of Present Illness  Deanne Pittman is a 74 year old /White female who presents for evaluation and treatment of:      follow up on chronic health conditions - refill atorvastatin - fasting labs     HTN/HLD/Cardiomyopathy - she continues to see Dr. Correa about every 6 months - she is still taking Coreg and Losartan - she is taking atorvastatin without c/o myalgia. No c/o chest pain, palpitations, headaches, dizziness, shortness of breath, or swelling in the legs - she has known PVD - vascular has recommended surgery, but she has chosen to defer. When it is hot she may swell a bit, but not much. Not enough that she has to even wear the stockings.     Stage III CKD - Dr. Alex Cabrera's office cancelled her for last month due to COVID. She has an appt with him in November to follow up.    Last mammogram was 6/21/19 and benign - no current breast complaints. Family history of breast cancer in her sister.     She states that it is not time for her c[facility] - [facility]       Past Medical History  Disease Name Date Onset Notes   Cardiomyopathy --  --    Hyperlipidemia --  --    Hypertension --  --    Stage III chronic kidney disease 06/12/2020 --          Past Surgical History  Procedure Name Date Notes   Colostomy --  --    Colostomy Reversal 1995 --    Hysterectomy --  --    Liver Surgery 1974 --    Lumpectomy of left breast --  --          Medication List  Name Date Started Instructions   atorvastatin 20 mg oral tablet 01/07/2020 TAKE 1  TABLET DAILY AT BEDTIME   carvedilol 6.25 mg oral tablet  take 1 tablet (6.25 mg) by oral route 2 times per day with food   losartan 25 mg oral tablet  take 1 tablet (25 mg) by oral route once daily   vitamin B12-folic acid 500-400 mcg oral tablet  --          Allergy List  Allergen Name Date Reaction Notes   PENICILLINS --  --  --        Allergies Reconciled  Family Medical History  Disease Name Relative/Age Notes   Breast Neoplasm, Malignant Sister/   --    Rectal Neoplasm, Malignant Sister/   --    Lupus Erythematosus Sister/   --    Skin Carcinoma Mother/   --          Social History  Finding Status Start/Stop Quantity Notes   Tobacco Former --/-- .5ppd  quit Jan 2019         Immunizations  NameDate Admin Mfg Trade Name Lot Number Route Inj VIS Given VIS Publication   HepA02/26/2019 MSD VAQTA-ADULT [id number]  LA 02/26/2019 07/20/2016   Comments: 7942-4473-66   HepA08/14/2018 SKB HAVRIX-ADULT [id number] [facility] 08/14/2018 07/20/2016   Comments: 49014-541-08   Ypmjooufp4525/10/2018 MSD PNEUMOVAX 23 [id number]  [place] 12/10/2018 04/24/2015   Comments: NDC 4780-7624-76   Dsbhznw7809/08/2017 WAL PREVNAR 13 [id number] [facility] 08/14/2018 11/05/2015   Comments:    Tdap01/18/2019 PMC BOOSTRIX GA5Z5 [facility] 01/18/2019 02/24/2015   Comments: NDC 91885-082-75         Review of Systems  · Constitutional  o Admits  o : good general health lately  o Denies  o : fatigue, fever, chills, body aches  · Eyes  o Denies  o : blurred vision, changes in vision  · HENT  o Denies  o : headaches, vertigo, lightheadedness  · Breasts  o Denies  o : lumps, tenderness, swelling, nipple discharge  · Cardiovascular  o Denies  o : chest pain, syncope, dyspnea on exertion, lower extremity edema, palpitations  · Respiratory  o Denies  o : shortness of breath, cough  · Gastrointestinal  o Denies  o : nausea, vomiting, diarrhea, constipation, dysphagia, abdominal pain, blood in stools, hematochezia  · Genitourinary  o Denies  o : dysuria, urinary  "retention  · Integument  o Denies  o : rash, pigmentation changes  · Neurologic  o Denies  o : dizziness  · Musculoskeletal  o Denies  o : joint pain, joint swelling  · Endocrine  o Denies  o : polyuria, polydipsia, cold intolerance, heat intolerance      Vitals  Date Time BP Position Site L\R Cuff Size HR RR TEMP (F) WT  HT  BMI kg/m2 BSA m2 O2 Sat HC       06/12/2020 09:07 /80 Sitting    93 - R  97.1 147lbs 16oz 5'  1\" 27.96 1.7 95 %          Physical Examination  · Constitutional  o Appearance  o : well developed, well-nourished, in no acute distress  · Eyes  o Conjunctivae  o : conjunctivae normal, no exudates present, excessive tearing present   o Pupils and Irises  o : pupils equal and round, pupils reactive to light bilaterally  · Neck  o Inspection/Palpation  o : normal appearance, no masses or tenderness, trachea midline  o Thyroid  o : no thyromegaly  · Respiratory  o Respiratory Effort  o : breathing unlabored  o Auscultation of Lungs  o : clear to auscultation  · Cardiovascular  o Heart  o :   § Auscultation of Heart  § : regular rate, normal rhythm, no murmurs present  o Peripheral Vascular System  o :   § Carotid Arteries  § : normal pulses bilaterally, no bruits present  § Pedal Pulses  § : bilateral pulse strength 2+  § Extremities  § : no edema  · Lymphatic  o Neck  o : no lymphadenopathy present  · Musculoskeletal  o General  o :   § General Musculoskeletal  § : Muscle tone, strength, and development grossly normal.  · Skin and Subcutaneous Tissue  o General Inspection  o : no lesions present, no areas of discoloration, skin turgor normal, texture normal  · Neurologic  o Gait and Station  o :   § Gait Screening  § : normal gait  · Psychiatric  o Mood and Affect  o : mood normal, affect appropriate          Assessment  · Visit for screening mammogram     V76.12/Z12.31  · Vitamin B12 deficiency     266.2/E53.8  · Allergic rhinitis due to allergen     477.9/J30.9  · Essential " hypertension     401.9/I10  · Hyperlipidemia     272.4/E78.5  · Cardiomyopathy     425.8/I43  · Stage III chronic kidney disease     585.3/N18.3    Problems Reconciled  Plan  · Orders  o Screening Mammogram 2D Bilateral (94079, ) - V76.12/Z12.31 - 06/12/2020   last 6/21/2019 - wants to go to [facility]   wants in AM - around 10AM  o B12 Folate levels (B12FO) - 266.2/E53.8 - 06/12/2020  o CBC with Auto Diff [facility] (61238) - 401.9/I10, 272.4/E78.5, 585.3/N18.3 - 06/12/2020  o CMP [facility] (70362) - 401.9/I10, 272.4/E78.5, 585.3/N18.3 - 06/12/2020  o Lipid Panel [facility] (78246) - 401.9/I10, 272.4/E78.5 - 06/12/2020  o Thyroid Profile (THYII) - 401.9/I10 - 06/12/2020  o Urinalysis with Reflex Microscopy if abnormal ([facility]) (37294) - 401.9/I10, 585.3/N18.3 - 06/12/2020  o Urine microalbumin (60627) - 401.9/I10, 585.3/N18.3 - 06/12/2020  o ACO-13: Fall Risk Screening with no falls in past year or only one fall without injury in the past year (1101F) - - 06/12/2020  o ACO-39: Current medications updated and reviewed () - - 06/12/2020  · Medications  o atorvastatin 20 mg oral tablet   SIG: TAKE 1 TABLET DAILY AT BEDTIME   DISP: (90) Tablet with 1 refills  Refilled on 06/12/2020     o Medications have been Reconciled  o Transition of Care or Provider Policy  · Instructions  o Take all medications as prescribed/directed.  o Patient was educated/instructed on their diagnosis, treatment and medications prior to discharge from the clinic today.  o Chronic conditions reviewed and taken into consideration for today's treatment plan.  · Disposition  o Call or Return if symptoms worsen or persist.            Electronically Signed by: BETSY Thomas -Author on June 12, 2020 09:37:07 AM

## 2021-05-07 NOTE — PROGRESS NOTES
Progress Note      Patient Name: Deanne Pittman   Patient ID: [id number]   Sex: Female   YOB: 1945    Primary Care Provider: Huyen MEYER   Referring Provider: Huyen MEYER    Visit Date: Sravanthi 10, 2019    Provider: BETSY Thomas   Location: [facility]   Location Address: [address] [place] [address] [place]  [id number]   Location Phone: [phone]          Chief Complaint     refill atorvastatin and lab work       History Of Present Illness  Deanne Pittman is a 73 year old /White female who presents for evaluation and treatment of:      she needs a refill on her atorvastatin and recheck lipids.     She stopped smoking on the 9th of January -- she started smoking in 1972 when they opened [facility] --she smoked for 47 years and it was very rate for her to smoke over 1/2 pack per day.     She last saw Dr. Correa on 5/15 - no changes from him -- follow up in 6 months.    She is asking what she can take for indigestion -- she doesn't have it daily, just intermittently, and mainly at night -- sometimes it will make her vomit if she doesn't get it to subside.       Past Medical History  Disease Name Date Onset Notes   Cardiomyopathy --  --    Hyperlipidemia --  --    Hypertension --  --          Past Surgical History  Procedure Name Date Notes   Colostomy --  --    Colostomy Reversal 1995 --    Hysterectomy --  --    Liver Surgery 1974 --    Lumpectomy of left breast --  --          Medication List  Name Date Started Instructions   atorvastatin 20 mg oral tablet  take 1 tablet (20 mg) by oral route once daily at bedtime   carvedilol 6.25 mg oral tablet  take 1 tablet (6.25 mg) by oral route 2 times per day with food   losartan 25 mg oral tablet  take 1 tablet (25 mg) by oral route once daily         Allergy List  Allergen Name Date Reaction Notes   PENICILLINS --  --  --          Family Medical History  Disease Name Relative/Age Notes   Breast  "Neoplasm, Malignant Sister/   --    Rectal Neoplasm, Malignant Sister/   --    Lupus Erythematosus Sister/   --    Skin Carcinoma Mother/   --          Social History  Finding Status Start/Stop Quantity Notes   Tobacco Former --/-- .5ppd  quit Jan 2019         Immunizations  NameDate Admin Mfg Trade Name Lot Number Route Inj VIS Given VIS Publication   HepA02/26/2019 MSD VAQTA-ADULT [id number]  [place] 02/26/2019 07/20/2016   Comments: 4928-8236-52   HepA08/14/2018 SKB HAVRIX-ADULT [id number] [facility] 08/14/2018 07/20/2016   Comments: 49267-123-80   Xpujjiuwu7502/10/2018 MSD PNEUMOVAX 23 [id number]  [place] 12/10/2018 04/24/2015   Comments: NDC 0385-7740-06   Wessniw9404/08/2017 WAL PREVNAR 13 [id number]  [facility] 08/14/2018 11/05/2015   Comments:    Tdap01/18/2019 PMC BOOSTRIX [id number] [facility] 01/18/2019 02/24/2015   Comments: NDC 71749-047-13         Review of Systems  · Constitutional  o Admits  o : good general health lately, weight gain d/t stopped smoking  · HENT  o Denies  o : headaches, vertigo, lightheadedness  · Cardiovascular  o Denies  o : chest pain, syncope, dyspnea on exertion, lower extremity edema, palpitations  · Respiratory  o Denies  o : shortness of breath  · Gastrointestinal  o Admits  o : heartburn, reflux  o Denies  o : nausea, vomiting, diarrhea, constipation, dysphagia, abdominal pain  · Integument  o Admits  o : changes to existing skin lesions or moles  · Neurologic  o Denies  o : dizziness  · Musculoskeletal  o Denies  o : joint pain, joint swelling      Vitals  Date Time BP Position Site L\R Cuff Size HR RR TEMP (F) WT  HT  BMI kg/m2 BSA m2 O2 Sat        06/10/2019 08:28 /70 Sitting    75 - R   141lbs 0oz 5'  2\" 25.79 1.67 97 %          Physical Examination  · Constitutional  o Appearance  o : well developed, well-nourished, in no acute distress  · Eyes  o Conjunctivae  o : conjunctivae normal, no exudates present  o Pupils and Irises  o : pupils equal and round, pupils reactive to light " bilaterally  · Neck  o Inspection/Palpation  o : normal appearance, no masses or tenderness, trachea midline  o Thyroid  o : no thyromegaly  · Respiratory  o Respiratory Effort  o : breathing unlabored  o Auscultation of Lungs  o : clear to ascultation  · Cardiovascular  o Heart  o :   § Auscultation of Heart  § : regular rate, normal rhythm, no murmurs present  o Peripheral Vascular System  o :   § Carotid Arteries  § : normal pulses bilaterally, no bruits present  § Pedal Pulses  § : bilateral pulse strength 2+  § Extremities  § : no edema  · Lymphatic  o Neck  o : no lymphadenopathy present  · Musculoskeletal  o General  o :   § General Musculoskeletal  § : Muscle tone, strength, and development grossly normal.  · Skin and Subcutaneous Tissue  o General Inspection  o : less than 5mm raised lesion on the chest -- flesh-colored with specks of brown -- irregular in shape and raised/mobile on the medial aspect  · Neurologic  o Gait and Station  o :   § Gait Screening  § : normal gait  · Psychiatric  o Mood and Affect  o : mood normal, affect appropriate          Assessment  · Hyperlipidemia     272.4/E78.5  · Skin lesion of chest wall     709.9/L98.9  · Cardiomyopathy     425.8/I43  · Hypertension     401.9/I10  · Stage 3 chronic kidney disease     585.3/N18.3      Plan  · Orders  o CMP [facility] (10781) - 272.4/E78.5, 401.9/I10, 585.3/N18.3 - 06/10/2019  o Lipid Panel [facility] (88942) - 272.4/E78.5, 401.9/I10 - 06/10/2019  o ACO-39: Current medications updated and reviewed () - - 06/10/2019  o DERMATOLOGY CONSULTATION (DERMA) - 709.9/L98.9 - 06/10/2019   [place] or Tamiko  · Instructions  o Patient was educated/instructed on their diagnosis, treatment and medications prior to discharge from the clinic today.  o Chronic conditions reviewed and taken into consideration for today's treatment plan.  · Disposition  o Call or Return if symptoms worsen or persist.            Electronically Signed by: BETSY Thomas  -Author on Sravanthi 10, 2019 09:03:30 AM

## 2021-05-07 NOTE — PROGRESS NOTES
Progress Note      Patient Name: Deanne Pittman   Patient ID: [id number]   Sex: Female   YOB: 1945    Primary Care Provider: Huyen MEYER   Referring Provider: Huyen MEYER    Visit Date: September 18, 2020    Provider: BETSY Thomas   Location: [facility]   Location Address: [address] [place] [address] [place]  [address]   Location Phone: [phone]          Chief Complaint     LT lower back pain       History Of Present Illness  Deanne Pittman is a 74 year old /White female who presents for evaluation and treatment of:      she states that on Monday she trimmed shrubbs - was in a awkward position from leaning - normally her back will hurt, but she will take a shower and be okay - this time it wasn't    She is hurting in the mid to lower back and it is radiating around to the left side - the pain is worse with movement, like bending and twisting - it has been better with rest - she has been using a heating pad at night when she sleeps. Denies any urinary or fecal incontinence. Denies any urinary urgency, frequency, or dysuria.       Past Medical History  Disease Name Date Onset Notes   Cardiomyopathy --  --    Hyperlipidemia --  --    Hypertension --  --    Stage III chronic kidney disease 06/12/2020 --          Past Surgical History  Procedure Name Date Notes   Colostomy --  --    Colostomy Reversal 1995 --    Hysterectomy --  --    Liver Surgery 1974 --    Lumpectomy of left breast --  --          Medication List  Name Date Started Instructions   atorvastatin 20 mg oral tablet 06/12/2020 TAKE 1 TABLET DAILY AT BEDTIME   carvedilol 6.25 mg oral tablet  take 1 tablet (6.25 mg) by oral route 2 times per day with food   losartan 25 mg oral tablet  take 1 tablet (25 mg) by oral route once daily   vitamin B12-folic acid 500-400 mcg oral tablet  --          Allergy List  Allergen Name Date Reaction Notes   PENICILLINS --  --  --        Allergies  Reconciled  Family Medical History  Disease Name Relative/Age Notes   Breast Neoplasm, Malignant Sister/   --    Rectal Neoplasm, Malignant Sister/   --    Lupus Erythematosus Sister/   --    Skin Carcinoma Mother/   --          Social History  Finding Status Start/Stop Quantity Notes   Tobacco Former --/-- .5ppd  quit Jan 2019         Immunizations  NameDate Admin Mfg Trade Name Lot Number Route Inj VIS Given VIS Publication   HepA02/26/2019 MSD VAQTA-ADULT [id number] IM [place] 02/26/2019 07/20/2016   Comments: 7987-4720-16   HepA08/14/2018 SKB HAVRIX-ADULT [id number] [facility] 08/14/2018 07/20/2016   Comments: 02599-034-80   Ctibscgws5317/10/2018 MSD PNEUMOVAX 23 [id number]  [place] 12/10/2018 04/24/2015   Comments: NDC 4867-2857-77   Kfenbyo6549/08/2017 WAL PREVNAR 13 [id number] IM [facility] 08/14/2018 11/05/2015   Comments:    Tdap01/18/2019 PMC BOOSTRIX GA5Z5  [place] 01/18/2019 02/24/2015   Comments: NDC 30122-824-90         Review of Systems  · Constitutional  o Admits  o : good general health lately  o Denies  o : fever, chills  · Cardiovascular  o Denies  o : chest pain, palpitations  · Respiratory  o Denies  o : shortness of breath, cough  · Gastrointestinal  o Denies  o : nausea, vomiting, diarrhea, constipation, abdominal pain, fecal incontinence  · Genitourinary  o Denies  o : urgency, frequency, dysuria, incontinence, urinary retention  · Integument  o Denies  o : rash, pigmentation changes  · Neurologic  o Denies  o : tingling or numbness  · Musculoskeletal  o Admits  o : muscle pain, limitation of motion, back pain      Vitals  Date Time BP Position Site L\R Cuff Size HR RR TEMP (F) WT  HT  BMI kg/m2 BSA m2 O2 Sat        09/18/2020 10:26 /80 Sitting    95 - R  97.5 147lbs 16oz    96 %          Physical Examination  · Constitutional  o Appearance  o : well developed, well-nourished, in no acute distress  · Respiratory  o Respiratory Effort  o : breathing unlabored, no accessory muscle use  o Auscultation of Lungs  o : clear to  auscultation  · Cardiovascular  o Heart  o :   § Auscultation of Heart  § : regular rate, normal rhythm, no murmurs present  o Peripheral Vascular System  o :   § Extremities  § : no edema  · Gastrointestinal  o Abdominal Examination  o :   § Abdomen  § : soft, non-tender, non-distended, bowel sounds +  · Musculoskeletal  o General  o :   § General Musculoskeletal  § : Muscle tone, strength, and development grossly normal. No gross spinal deformity. The midline spine is NTTP. There is paraspinal tenderness on the left in the thoracolumbar region - she reports pain with ROM and ROM is limited, especially flexion and axial rotation.   · Skin and Subcutaneous Tissue  o General Inspection  o : no lesions present, no areas of discoloration, skin turgor normal, texture normal  · Neurologic  o Gait and Station  o :   § Gait Screening  § : normal gait  · Psychiatric  o Mood and Affect  o : mood normal, affect appropriate          Assessment  · Musculoskeletal back pain     724.5/M54.9  · Left-sided back pain     724.9/M54.9    Problems Reconciled  Plan  · Orders  o IOP - Urinalysis without Microscopy (Clinitek) [facility] ([id number]) - 724.9/M54.9 - 09/18/2020   GLU NEG, SKIP NEG, KET NEG, SG 1.010, BLO NEG, PH 6.0, PRO NEG, URO 0.2, NIT NEG, GWENDOLYN TRACE   o ACO-39: Current medications updated and reviewed () - - 09/18/2020  · Medications  o Medrol (Isiah) 4 mg oral tablets,dose pack   SIG: take by oral route as directed per package instructions for 6 days   DISP: (1) 21 ct dose-pack with 0 refills  Prescribed on 09/18/2020     o cyclobenzaprine 5 mg oral tablet   SIG: take 1 tablet (5 mg) by oral route 3 times per day as needed for back pain. Hold for drowsiness.   DISP: (21) tablets with 0 refills  Prescribed on 09/18/2020     o Medications have been Reconciled  o Transition of Care or Provider Policy  · Instructions  o Take all medications as prescribed/directed.  o Patient was educated/instructed on their diagnosis, treatment and  medications prior to discharge from the clinic today. Will treat for musculoskeletal back pain - steroids and muscle relaxer - may use APAP PRN - avoid NSAIDS d/t stage III CKD - follow up in 1 week if no improvement.   · Disposition  o Call or Return if symptoms worsen or persist.            Electronically Signed by: BETSY Thomas -Author on September 18, 2020 10:56:43 AM

## 2021-05-07 NOTE — PROGRESS NOTES
"   Progress Note      Patient Name: Deanne Pittman   Patient ID: [id number]   Sex: Female   YOB: 1945        Visit Date: June 7, 2018    Provider: BETSY Thomas   Location: [facility]   Location Address: [address] [place] [address]  [id number]   Location Phone: [phone]          Chief Complaint     wants her cholesterol checked       History Of Present Illness  Deanne Pittman is a 72 year old /White female who presents for evaluation and treatment of:      she wants her cholesterol checked. All of her medications are actually filled and managed by Dr. Correa. She was originally going to transfer all of her care back to here, but when she told Dr. Correa that he told her \"no\" and that she had to go there--she is seeing him in own and was last there in May. She asked him to check her cholesterol and he then told her \"no\" and told her to come here and get that checked.     She is a smoker--she has smoked for 20 years or better. She has not real interest in quitting. She is not interested in getting the CT of her chest for lung cancer screening. She is a worrier--just like her daddy--she doesn't really want anything else to worry about.    Last colonoscopy was May 2013 per Dr. Riley and normal.     Last bone density was in 2011 and showed osteoporosis of the spine and osteopenia of the hips. The 10-year probability for fracture are 10% and 2.6%       Past Medical History  Disease Name Date Onset Notes   Cardiomyopathy --  --    Hyperlipidemia --  --    Hypertension --  --          Past Surgical History  Procedure Name Date Notes   Colostomy --  --    Colostomy Reversal 1995 --    Hysterectomy --  --    Liver Surgery 1974 --    Lumpectomy of left breast --  --          Medication List  Name Date Started Instructions   atorvastatin 20 mg oral tablet  take 1 tablet (20 mg) by oral route once daily for 90 days   B Complex-Vitamin B12 oral tablet  take 1 " tablet by oral route daily   carvedilol 6.25 mg oral tablet  take 0.5 tablet by oral route 2 times a day for 90 days   fluticasone 50 mcg/actuation nasal spray,suspension  --    losartan 100 mg oral tablet  take 1 tablet (100 mg) by oral route once daily for 90 days   spironolactone 25 mg oral tablet  take 1 tablet (25 mg) by oral route once daily for 90 days   Vitamin D3 oral  --          Allergy List  Allergen Name Date Reaction Notes   PENICILLINS --  --  --          Family Medical History  Disease Name Relative/Age Notes   Breast Neoplasm, Malignant / --     Sister/    Lupus Erythematosus / --     Sister/    Rectal Neoplasm, Malignant / --     Sister/    Skin Carcinoma / --     Mother/          Social History  Finding Status Start/Stop Quantity Notes   Tobacco Current every day --/-- 1/2 pack --          Review of Systems  · Constitutional  o Admits  o : good general health lately  · HENT  o Denies  o : headaches, vertigo, lightheadedness  · Cardiovascular  o Denies  o : chest pain, irregular heart beats, syncope, dyspnea on exertion  · Respiratory  o Denies  o : shortness of breath, wheezing, cough  · Gastrointestinal  o Admits  o : nausea from fasting  o Denies  o : vomiting, diarrhea, abdominal pain  · Integument  o Denies  o : pigmentation changes  · Musculoskeletal  o Denies  o : joint pain, joint swelling  · Psychiatric  o Admits  o : anxiety  o Denies  o : depression, difficulty sleeping, suicidal ideation, homicidal ideation      Vitals  Date Time BP Position Site L\R Cuff Size HR RR TEMP(F) WT  HT  BMI kg/m2 BSA m2 O2 Sat        06/07/2018 09:07 /80 Sitting    103 - R   129lbs 0oz    96 %           Physical Examination  · Constitutional  o Appearance  o : well developed, well-nourished, in no acute distress  · Eyes  o Conjunctivae  o : conjunctivae normal, no exudates present  · Neck  o Inspection/Palpation  o : normal appearance, no masses or tenderness, trachea midline  o Thyroid  o : no  thyromegaly  · Respiratory  o Respiratory Effort  o : breathing unlabored  o Auscultation of Lungs  o : clear to ascultation  · Cardiovascular  o Heart  o :   § Auscultation of Heart  § : regular rate and rhythm  o Peripheral Vascular System  o :   § Carotid Arteries  § : normal pulses bilaterally, no bruits present  § Extremities  § : no edema  · Lymphatic  o Neck  o : no lymphadenopathy present  · Musculoskeletal  o General  o :   § General Musculoskeletal  § : No joint swelling or deformity. Muscle tone, strength, and development grossly normal.  · Skin and Subcutaneous Tissue  o General Inspection  o : no lesions present, no areas of discoloration, skin turgor normal, texture normal  · Neurologic  o Gait and Station  o :   § Gait Screening  § : normal gait  · Psychiatric  o Mood and Affect  o : mood normal, affect appropriate              Assessment  · Essential hypertension     401.9/I10  · Hyperlipidemia     272.4/E78.5  · Nicotine dependence     305.1/F17.200  · Osteoporosis     733.00/M81.0  · Encounter for hepatitis C virus screening test for high risk patient       Encounter for screening for other viral diseases     V73.89/Z11.59  Other specified personal risk factors, not elsewhere classified     V73.89/Z91.89  · Post-menopausal     V49.81/Z78.0  · Screening for depression     V79.0/Z13.89      Plan  · Orders  o DEXA Bone Density, 1 or more sites, axial skeleton [facility] (69270) - 733.00/M81.0 - 06/07/2018  o Collection Fee for Venipuncture (13747) - - 06/07/2018  o Lipid Panel [facility] (90434) - 401.9/I10, 272.4/E78.5 - 06/07/2018  o ACO-14: Influenza immunization was not administered for reasons documented () - - 06/07/2018  o ACO-15: Pneumococcal Vaccine Administered or Previously Received (4040F) - - 06/07/2018  o ACO-17: Screened for tobacco use AND received tobacco cessation intervention (4004F) - - 06/07/2018  o ACO-18: Positive screen for clinical depression using a standardized tool and a follow-up  plan documented () - V79.0/Z13.89 - 06/07/2018  o ACO-19: Colorectal cancer screening results documented and reviewed (3017F) - - 06/07/2018   Dr. Riley--2013--normal  o ACO-20: Screening Mammography documented and reviewed (3014F) - - 06/07/2018 March 2018--normal; Repeat 1 year  o ACO-39: Current medications updated and reviewed () - - 06/07/2018  o Hepatitis C antibody MEDICARE screening [facility] () - V73.89/Z11.59, V73.89/Z91.89 - 06/07/2018  o DEXA with Vertebral Fracture Assessment (VFA) [facility] (96259) - V49.81/Z78.0, 733.00/M81.0 - 06/07/2018  · Instructions  o *Form of nicotine being used: tobacco/cigarettes  o Patient was strongly encouraged to discontinue use of any nicotine containing product or minimize the use of the product.  o Stop taking calcium supplements for at least 48 hours prior to your exam. Failure to stop supplements could alter results, and the radiologists will require you to reschedule your test.  o Patient was educated/instructed on their diagnosis, treatment and medications prior to discharge from the clinic today.  o Chronic conditions reviewed and taken into consideration for today's treatment plan.  · Disposition  o Call or Return if symptoms worsen or persist.            Electronically Signed by: BETSY Thomas -Author on June 7, 2018 09:42:36 AM

## 2021-05-07 NOTE — PROGRESS NOTES
Progress Note      Patient Name: Deanne Pittman   Patient ID: [id number]   Sex: Female   YOB: 1945        Visit Date: August 14, 2018    Provider: BETSY Thomas   Location: [facility]   Location Address: [address] [place] [address]  [id number]   Location Phone: [phone]          Chief Complaint     follow up on bone density       History Of Present Illness  Deanne Pittman is a 72 year old /White female who presents for evaluation and treatment of:      follow up on bone density.    Her DEXA showed osteopenia of the lumbar spine with osteoporosis at L4--osteopenia in the bilateral hips with borderline osteoporosis in the right femoral neck--compared to 2011 her bone density has decreased by 7% in the hips--10 year fracture risk for hips is 7% and 10 year risk for any fracture.     She has taken Fosamax in the past and this seemingly bothered her stomach--she cannot recall the exact symptoms but it either made her sick to her stomach or gave her diarrhea or constipation. She was only able to take it a few times before having to stop it. She does take both calcium and vitamin D.     She is also requesting a refill on the cholesterol medications--Dr. Correa manages her BP medication, but won't fill the cholesterol medicine. No c/o of myalgias or muscle cramping.       Past Medical History  Disease Name Date Onset Notes   Cardiomyopathy --  --    Hyperlipidemia --  --    Hypertension --  --          Past Surgical History  Procedure Name Date Notes   Colostomy --  --    Colostomy Reversal 1995 --    Hysterectomy --  --    Liver Surgery 1974 --    Lumpectomy of left breast --  --          Medication List  Name Date Started Instructions   atorvastatin 20 mg oral tablet  take 1 tablet (20 mg) by oral route once daily for 90 days   B Complex-Vitamin B12 oral tablet  take 1 tablet by oral route daily   carvedilol 6.25 mg oral tablet  take 0.5 tablet by oral route  2 times a day for 90 days   losartan 100 mg oral tablet  take 1 tablet (100 mg) by oral route once daily for 90 days   spironolactone 25 mg oral tablet  take 1 tablet (25 mg) by oral route once daily for 90 days   Vitamin D3 oral  --          Allergy List  Allergen Name Date Reaction Notes   PENICILLINS --  --  --          Family Medical History  Disease Name Relative/Age Notes   Breast Neoplasm, Malignant / --     Sister/    Lupus Erythematosus / --     Sister/    Rectal Neoplasm, Malignant / --     Sister/    Skin Carcinoma / --     Mother/          Social History  Finding Status Start/Stop Quantity Notes   Tobacco Current every day --/-- 1/2 pack --          Vitals  Date Time BP Position Site L\R Cuff Size HR RR TEMP(F) WT  HT  BMI kg/m2 BSA m2 O2 Sat HC       08/14/2018 09:25 /60 Sitting    98 - R  97.7 129lbs 0oz    97 %           Physical Examination  · Constitutional  o Appearance  o : well developed, well-nourished, in no acute distress  · Eyes  o Conjunctivae  o : conjunctivae normal  · Neck  o Inspection/Palpation  o : supple  o Thyroid  o : no thyromegaly  · Respiratory  o Respiratory Effort  o : breathing unlabored  o Auscultation of Lungs  o : clear to ascultation  · Cardiovascular  o Heart  o :   § Auscultation of Heart  § : regular rate and rhythm  o Peripheral Vascular System  o :   § Extremities  § : no edema  · Lymphatic  o Neck  o : no lymphadenopathy present  · Musculoskeletal  o General  o :   § General Musculoskeletal  § : No joint swelling or deformity., Muscle tone, strength, and development grossly normal.  · Skin and Subcutaneous Tissue  o General Inspection  o : no lesions present, no areas of discoloration, skin turgor normal, texture normal  · Neurologic  o Gait and Station  o :   § Gait Screening  § : normal gait  · Psychiatric  o Mood and Affect  o : mood normal, affect appropriate              Assessment  · Osteoporosis of lumbar spine     733.00/M81.8  · Osteopenia of both  hips       Other specified disorders of bone density and structure, right thigh     733.90/M85.851  Other specified disorders of bone density and structure, left thigh     733.90/M85.852  · Encounter for administration of vaccine     V05.9/Z23  · Hepatitis A vaccination not up to date     V49.89/Z28.3      Plan  · Orders  o Collection Fee for Venipuncture (67439) - - 08/14/2018  o CMP [facility] (02936) - 733.00/M81.8, 733.90/M85.851, 733.90/M85.852 - 08/14/2018  o ACO-13: Fall Risk Screening with no falls in past year or only one fall without injury in the past year (1101F) - - 08/14/2018  o ACO-15: Pneumococcal Vaccine Administered or Previously Received (4040F) - - 08/14/2018   Prevnar 13 given 12/2017  o ACO-39: Current medications updated and reviewed () - - 08/14/2018  o Immunization Admin Fee (Single) ([facility]) (92099) - V05.9/Z23, V49.89/Z28.3 - 08/14/2018  o Havrix Adult Vaccine (36837) - V05.9/Z23, V49.89/Z28.3 - 08/14/2018   Vaccine - HepA; Dose: 1.0; Site: Right Arm; Route: Intramuscular; Date: 08/14/2018 10:11:00; Exp: 01/29/2021; Lot: [id number]; Mfg: Docphin; TradeName: Havrix Adult; Administered By: Ana Tovar MA; Comment: 11248-020-24  · Medications  o Shingrix (PF) 50 mcg/0.5 mL intramuscular suspension for reconstitution   SIG: inject 0.5 milliliter (50 mcg) by intramuscular route once repeat 0.5 mL dose 2 to 6 months after the first dose (total of 2 doses)   DISP: (1) Kit with 0 refills  Prescribed on 08/14/2018     o ibandronate 150 mg oral tablet   SIG: take 1 tablet (150 mg) PO once monthly (same date) with a full glass of water, remain in upright position at least 60 minutes.   DISP: (3) tablets with 1 refills  Adjusted on 08/14/2018     o atorvastatin 20 mg oral tablet   SIG: take 1 tablet (20 mg) by oral route once daily for 90 days   DISP: (90) tablets with 1 refills  Adjusted on 08/14/2018     · Instructions  o Patient was educated/instructed on their diagnosis, treatment and  medications prior to discharge from the clinic today.            Electronically Signed by: BETSY Thomas -Author on August 14, 2018 11:12:01 AM

## 2021-05-07 NOTE — PROGRESS NOTES
Progress Note      Patient Name: Deanne Pittman   Patient ID: [id number]   Sex: Female   YOB: 1945    Primary Care Provider: Huyen MEYER   Referring Provider: Huyen MEYER    Visit Date: February 9, 2019    Provider: Mitch Dunn MD   Location: [facility]   Location Address: [address] [place] [address]  [id number]   Location Phone: [phone]          Chief Complaint     chest and head congestion, horrible cough started Wednesday.       History Of Present Illness  Deanne Pittman is a 73 year old /White female who presents for evaluation and treatment of:      cold symptoms x 3 days- sudden onset- worsening symptoms- otc meds not helping       Past Medical History  Disease Name Date Onset Notes   Cardiomyopathy --  --    Hyperlipidemia --  --    Hypertension --  --          Past Surgical History  Procedure Name Date Notes   Colostomy --  --    Colostomy Reversal 1995 --    Hysterectomy --  --    Liver Surgery 1974 --    Lumpectomy of left breast --  --          Medication List  Name Date Started Instructions   atorvastatin 20 mg oral tablet  take 1 tablet (20 mg) by oral route once daily at bedtime   carvedilol 6.25 mg oral tablet  take 1 tablet (6.25 mg) by oral route 2 times per day with food   losartan 25 mg oral tablet  take 1 tablet (25 mg) by oral route once daily         Allergy List  Allergen Name Date Reaction Notes   PENICILLINS --  --  --          Family Medical History  Disease Name Relative/Age Notes   Breast Neoplasm, Malignant / --     Sister/    Lupus Erythematosus / --     Sister/    Rectal Neoplasm, Malignant / --     Sister/    Skin Carcinoma / --     Mother/          Social History  Finding Status Start/Stop Quantity Notes   Tobacco Current every day --/-- 1/2 pack --          Immunizations  NameDate Admin Mfg Trade Name Lot Number Route Inj VIS Given VIS Publication   HepA08/14/2018 SKB Havrix Adult [id number] IM RA  08/14/2018 07/20/2016   Comments: 35228-854-66   Dhojlttdp9809/10/2018 MSD Pneumovax 23 [id number] IM LA 12/10/2018 04/24/2015   Comments: NDC 1651-8244-55   Bfhwici8007/08/2017 WAL Prevnar 13 [id number] IM UKN 08/14/2018 11/05/2015   Comments:    Tdap01/18/2019 PMC BOOSTRIX [id number] IM LA 01/18/2019 02/24/2015   Comments: NDC 61554-279-38         Review of Systems  · Constitutional  o Admits  o : fever  o Denies  o : fatigue  · HENT  o Admits  o : sinus pain, nasal congestion, nasal discharge, sore throat  · Cardiovascular  o Denies  o : chest pain, palpitations  · Respiratory  o Admits  o : cough  o Denies  o : shortness of breath  · Gastrointestinal  o Denies  o : nausea, vomiting, diarrhea      Vitals  Date Time BP Position Site L\R Cuff Size HR RR TEMP(F) WT  HT  BMI kg/m2 BSA m2 O2 Sat        02/09/2019 09:35 /80 Sitting    94 - R  97.2 134lbs 0oz    96 %           Physical Examination  · Constitutional  o Appearance  o : well developed, well-nourished, in no acute distress  · Ears, Nose, Mouth and Throat  o Ears  o :   § Otoscopic Examination  § : tympanic membrane appearance within normal limits bilaterally  o Throat  o :   § Oropharynx  § : erythema of throat, uvula midline, throat open, no abscesses seen, bilateral maxillary sinus tenderness  · Respiratory  o Respiratory Effort  o : breathing unlabored  o Auscultation of Lungs  o : clear to ascultation  · Cardiovascular  o Heart  o :   § Auscultation of Heart  § : regular rate and rhythm  · Musculoskeletal  o General  o :   § General Musculoskeletal  § : No joint swelling or deformity., Muscle tone, strength, and development grossly normal.  · Neurologic  o Mental Status Examination  o :   § Orientation  § : grossly oriented to person, place and time  o Gait and Station  o :   § Gait Screening  § : normal gait              Assessment  · Maxillary sinusitis     473.0/J32.0  · URI (upper respiratory infection)     465.9/J06.9      Plan  · Orders  o ACO-39:  Current medications updated and reviewed () - - 02/09/2019  o IOP - Influenza A/B Test (02837) - 465.9/J06.9 - 02/09/2019   flu a neg flu b neg  · Medications  o clindamycin HCl 300 mg oral capsule   SIG: take 1 capsule (300 mg) by oral route every 6 hours for 7 days   DISP: (28) capsules with 0 refills  Prescribed on 02/09/2019     o Probiotic 20 billion cell oral capsule   SIG: take 1 capsule by oral route 2 times a day for 10 days   DISP: (20) capsules with 0 refills  Prescribed on 02/09/2019     o Medrol (Isiah) 4 mg oral tablets,dose pack   SIG: take as directed for 5 days   DISP: (1) 21 ct dose-pack with 0 refills  Prescribed on 02/09/2019     · Instructions  o Patient was educated/instructed on their diagnosis, treatment and medications prior to discharge from the clinic today.            Electronically Signed by: Mitch Dunn MD -Author on February 9, 2019 10:15:50 AM

## 2021-05-09 VITALS
OXYGEN SATURATION: 100 % | HEART RATE: 69 BPM | DIASTOLIC BLOOD PRESSURE: 70 MMHG | SYSTOLIC BLOOD PRESSURE: 110 MMHG | WEIGHT: 151 LBS | TEMPERATURE: 96.9 F

## 2021-05-09 VITALS
HEART RATE: 80 BPM | DIASTOLIC BLOOD PRESSURE: 70 MMHG | TEMPERATURE: 97.6 F | OXYGEN SATURATION: 98 % | WEIGHT: 144 LBS | SYSTOLIC BLOOD PRESSURE: 110 MMHG

## 2021-05-09 VITALS
OXYGEN SATURATION: 96 % | HEART RATE: 95 BPM | SYSTOLIC BLOOD PRESSURE: 120 MMHG | WEIGHT: 148 LBS | TEMPERATURE: 97.5 F | DIASTOLIC BLOOD PRESSURE: 80 MMHG

## 2021-05-09 VITALS
HEIGHT: 62 IN | WEIGHT: 141 LBS | HEART RATE: 75 BPM | DIASTOLIC BLOOD PRESSURE: 70 MMHG | BODY MASS INDEX: 25.95 KG/M2 | OXYGEN SATURATION: 97 % | SYSTOLIC BLOOD PRESSURE: 110 MMHG

## 2021-05-09 VITALS
WEIGHT: 143 LBS | HEART RATE: 108 BPM | DIASTOLIC BLOOD PRESSURE: 80 MMHG | TEMPERATURE: 97.5 F | SYSTOLIC BLOOD PRESSURE: 120 MMHG | OXYGEN SATURATION: 98 %

## 2021-05-09 VITALS
SYSTOLIC BLOOD PRESSURE: 120 MMHG | OXYGEN SATURATION: 99 % | DIASTOLIC BLOOD PRESSURE: 80 MMHG | HEART RATE: 87 BPM | WEIGHT: 146 LBS

## 2021-05-09 VITALS
SYSTOLIC BLOOD PRESSURE: 110 MMHG | OXYGEN SATURATION: 97 % | HEART RATE: 98 BPM | WEIGHT: 129 LBS | DIASTOLIC BLOOD PRESSURE: 60 MMHG | TEMPERATURE: 97.7 F

## 2021-05-09 VITALS
WEIGHT: 138 LBS | HEART RATE: 93 BPM | TEMPERATURE: 96.8 F | DIASTOLIC BLOOD PRESSURE: 80 MMHG | SYSTOLIC BLOOD PRESSURE: 120 MMHG | OXYGEN SATURATION: 99 %

## 2021-05-09 VITALS
SYSTOLIC BLOOD PRESSURE: 110 MMHG | OXYGEN SATURATION: 96 % | DIASTOLIC BLOOD PRESSURE: 80 MMHG | WEIGHT: 129 LBS | HEART RATE: 103 BPM

## 2021-05-09 VITALS
OXYGEN SATURATION: 96 % | DIASTOLIC BLOOD PRESSURE: 80 MMHG | SYSTOLIC BLOOD PRESSURE: 120 MMHG | HEART RATE: 94 BPM | WEIGHT: 134 LBS | TEMPERATURE: 97.2 F

## 2021-05-09 VITALS
WEIGHT: 143 LBS | SYSTOLIC BLOOD PRESSURE: 120 MMHG | DIASTOLIC BLOOD PRESSURE: 80 MMHG | OXYGEN SATURATION: 98 % | HEART RATE: 92 BPM | TEMPERATURE: 98.2 F

## 2021-05-09 VITALS
SYSTOLIC BLOOD PRESSURE: 120 MMHG | DIASTOLIC BLOOD PRESSURE: 80 MMHG | OXYGEN SATURATION: 99 % | HEART RATE: 85 BPM | TEMPERATURE: 97.2 F | WEIGHT: 132 LBS

## 2021-05-09 VITALS
SYSTOLIC BLOOD PRESSURE: 130 MMHG | DIASTOLIC BLOOD PRESSURE: 70 MMHG | TEMPERATURE: 97.3 F | OXYGEN SATURATION: 98 % | WEIGHT: 151 LBS | HEART RATE: 82 BPM

## 2021-05-09 VITALS
WEIGHT: 131 LBS | TEMPERATURE: 97.5 F | DIASTOLIC BLOOD PRESSURE: 60 MMHG | HEIGHT: 62 IN | BODY MASS INDEX: 24.11 KG/M2 | OXYGEN SATURATION: 98 % | SYSTOLIC BLOOD PRESSURE: 110 MMHG | HEART RATE: 106 BPM

## 2021-05-09 VITALS
HEART RATE: 93 BPM | SYSTOLIC BLOOD PRESSURE: 130 MMHG | HEIGHT: 61 IN | OXYGEN SATURATION: 95 % | TEMPERATURE: 97.1 F | WEIGHT: 148 LBS | DIASTOLIC BLOOD PRESSURE: 80 MMHG | BODY MASS INDEX: 27.94 KG/M2

## 2021-05-18 ENCOUNTER — HOSPITAL ENCOUNTER (OUTPATIENT)
Dept: CARDIOLOGY | Facility: HOSPITAL | Age: 76
Discharge: HOME | End: 2021-05-18
Attending: SURGERY

## 2021-06-03 NOTE — H&P
Patient: GINA NAGY     Acct: [id number]     Report: #[id number]  MR #:  [id number]     DOS: 2021 1318     : 1945  DICTATING: JUAN ROJAS  ***Signed***  --------------------------------------------------------------------------------------------------------------------  [facility] H[facility]      DATE: 21            Huyen Damon      Chief Complaint      Foot pain            History of Present Illness      75-year-old female who presents with complaints of pain in the feet.  Right foot    is worse than the left.  The pain is on the top of her foot and is present when     she walks or stands.  She also complains of prominent veins on the top of the     foot.  She also has some numbness and tingling in the bilateral thighs in the     posterior region.  There is no swelling associated with this.  The symptoms have    been going on for several months.  She has no history of DVT.  No history of     injury to the legs.  She denies claudication or rest pain.  No open wounds of     her feet.            Allergies      Coded Allergies:             NO KNOWN ALLERGIES (Unverified , 13)            Home Medications      Home Meds      Reported Medications      Nebivolol HCl (BYSTOLIC) 5 Mg Tablet, 5 MG PO QDAY         1 TAB         13      Losartan Potassium (Cozaar) 100 Mg Tablet, 100 MG PO QDAY, TAB         1 TAB         13      Spironolactone (Spironolactone*) 25 Mg Tablet, 25 MG PO QDAY         1 TAB         13      Lovastatin (Lovastatin) 20 Mg Tab, 20 MG PO HS         1 TAB         13      Acetaminophen (Tylenol) 325 Mg Tablet, 325 MG PO Q4H.PRN, TAB         1 TAB         13      Aspirin/Acetaminophen/Caffeine (Excedrin Migraine*) 1 Tab Tablet, 1 TAB PO Q6H     PRN         13            Exam      HEENT:  Atraumatic      Neck:  Supple; No Carotid Bruits      Cardiovascular:  RRR      Lungs:  CTAB      Abdomen:  Normal BS all 4 Quadrants      Constitutional:   Healthy appearing      Extremeties:  Pulses Positive all 4 Ext      Psychological:  Other      Skin:  Other (Lacy appearance of the skin on her bilateral thighs.  Mild     varicosities bilaterally)            Impression      Varicose veins      Problems:        (1) Varicose veins of both lower extremities      Qualifiers:           Qualified Codes:  I83.93 - Asymptomatic varicose veins of bilateral lower     extremities            Plan      I explained to her that it is unclear what the cause of the pain in her foot is.     Also cannot explain why she has the numbness and tingling in her bilateral     thighs.  Arterial exam is normal.  Ultrasound from 2019 shows reflux in the     greater saphenous vein bilaterally.  No need for any intervention at this time.     She may follow-up as needed.            Thank you for allowing me to see your patient.            Carbon Copy      VESSELS,JUAN REECE                 May 18, 2021 13:18      Electronically signed by JUAN ROJAS  05/18/2021 13:18     Disclaimer: Converted hospital document may not contain table formatting or lab diagrams. Please see [facility] for authenticated document.

## 2021-06-08 RX ORDER — ATORVASTATIN CALCIUM 20 MG/1
20 TABLET, FILM COATED ORAL NIGHTLY
Qty: 90 TABLET | Refills: 0 | Status: SHIPPED | OUTPATIENT
Start: 2021-06-08 | End: 2021-08-27

## 2021-06-08 RX ORDER — LOSARTAN POTASSIUM 25 MG/1
1 TABLET ORAL DAILY
COMMUNITY

## 2021-06-08 RX ORDER — ATORVASTATIN CALCIUM 20 MG/1
1 TABLET, FILM COATED ORAL DAILY
COMMUNITY
Start: 2021-03-07 | End: 2021-06-08

## 2021-06-08 RX ORDER — CARVEDILOL 6.25 MG/1
1 TABLET ORAL 2 TIMES DAILY
COMMUNITY
Start: 2021-06-07

## 2021-06-22 ENCOUNTER — OFFICE VISIT (OUTPATIENT)
Dept: FAMILY MEDICINE CLINIC | Facility: CLINIC | Age: 76
End: 2021-06-22

## 2021-06-22 VITALS
BODY MASS INDEX: 28.51 KG/M2 | TEMPERATURE: 96.2 F | HEIGHT: 61 IN | OXYGEN SATURATION: 95 % | HEART RATE: 90 BPM | DIASTOLIC BLOOD PRESSURE: 80 MMHG | SYSTOLIC BLOOD PRESSURE: 120 MMHG | WEIGHT: 151 LBS

## 2021-06-22 DIAGNOSIS — Z78.0 POSTMENOPAUSE: ICD-10-CM

## 2021-06-22 DIAGNOSIS — Z12.31 ENCOUNTER FOR SCREENING MAMMOGRAM FOR BREAST CANCER: Primary | ICD-10-CM

## 2021-06-22 PROBLEM — I87.2 CHRONIC VENOUS INSUFFICIENCY: Status: ACTIVE | Noted: 2019-10-18

## 2021-06-22 PROBLEM — E78.5 HYPERLIPIDEMIA: Status: ACTIVE | Noted: 2021-06-22

## 2021-06-22 PROBLEM — N18.30 STAGE III CHRONIC KIDNEY DISEASE: Status: ACTIVE | Noted: 2018-08-17

## 2021-06-22 PROBLEM — I10 ESSENTIAL (PRIMARY) HYPERTENSION: Status: ACTIVE | Noted: 2018-08-17

## 2021-06-22 PROBLEM — I42.9 CARDIOMYOPATHY: Status: ACTIVE | Noted: 2021-06-22

## 2021-06-22 PROCEDURE — 99213 OFFICE O/P EST LOW 20 MIN: CPT | Performed by: NURSE PRACTITIONER

## 2021-06-22 RX ORDER — CETIRIZINE HYDROCHLORIDE 10 MG/1
10 TABLET ORAL DAILY
COMMUNITY
End: 2022-01-11

## 2021-06-22 NOTE — PROGRESS NOTES
Chief Complaint  order for mammogram    Subjective            Deanne Pittman presents to [facility]  Needs order for mammogram.    Last mammogram was 2020 and benign.  Patient denies any current breast complaints.    Last DEXA was in 2018 and showed osteopenia.  Patient is status post hysterectomy.      PHQ-2 Total Score: 0      Past Medical History:   Diagnosis Date   • Cardiomyopathy (CMS/HCC)    • Hyperlipidemia    • Hypertension    • Renal cyst     right   • Stage 3 chronic kidney disease (CMS/HCC) 2020       Allergies   Allergen Reactions   • Penicillins Unknown - Low Severity        Past Surgical History:   Procedure Laterality Date   • BREAST SURGERY Left     lump removed   • COLON SURGERY     • LIVER SURGERY      exploritory surgery due to puncture to liver    • SUBTOTAL HYSTERECTOMY          Social History     Tobacco Use   • Smoking status: Former Smoker     Packs/day: 0.50     Quit date: 2019     Years since quittin.4   • Smokeless tobacco: Never Used   Vaping Use   • Vaping Use: Never used   Substance Use Topics   • Alcohol use: Not on file   • Drug use: Not on file       Family History   Problem Relation Age of Onset   • Skin cancer Mother         skin carcinoma   • Breast cancer Sister    • Lupus Sister         lupus erythematosus   • Breast cancer Sister    • Rectal cancer Sister         Health Maintenance Due   Topic Date Due   • COLORECTAL CANCER SCREENING  Never done   • ZOSTER VACCINE (2 of 2) 10/24/2018   • DXA SCAN  2020   • HEPATITIS C SCREENING  Never done   • ANNUAL WELLNESS VISIT  Never done        Current Outpatient Medications on File Prior to Visit   Medication Sig   • atorvastatin (LIPITOR) 20 MG tablet Take 1 tablet by mouth Every Night. Take once daily at bedtime   • carvedilol (COREG) 6.25 MG tablet Take 1 tablet by mouth 2 (two) times a day.   • cetirizine (zyrTEC) 10 MG tablet Take 10 mg by mouth Daily.   •  "cyanocobalamin (VITAMIN B-12) 1000 MCG tablet Take 1,000 mcg by mouth.   • losartan (COZAAR) 25 MG tablet Take 1 tablet by mouth Daily.     No current facility-administered medications on file prior to visit.       Immunization History   Administered Date(s) Administered   • COVID-19 (PFIZER) 03/31/2021, 04/21/2021   • Hepatitis A 08/14/2018, 02/26/2019   • Influenza, Unspecified 10/22/2020   • Pneumococcal Conjugate 13-Valent (PCV13) 12/08/2017   • Pneumococcal Polysaccharide (PPSV23) 12/10/2018   • Shingrix 08/29/2018   • Tdap 01/18/2019       [facility]  Review of Systems   Constitutional: Negative for chills, fatigue and fever.   Respiratory: Negative for chest tightness and shortness of breath.    Cardiovascular: Negative for chest pain, palpitations and leg swelling.   Genitourinary: Negative for breast discharge, breast lump and breast pain.   Musculoskeletal: Positive for arthralgias. Negative for gait problem.   Neurological: Negative for weakness.   Psychiatric/Behavioral: Negative for sleep disturbance, suicidal ideas and depressed mood. The patient is not nervous/anxious.         Objective     /80   Pulse 90   Temp 96.2 °F (35.7 °C)   Ht 154.9 cm (61\")   Wt 68.5 kg (151 lb)   SpO2 95%   BMI 28.53 kg/m²       Physical Exam  Vitals reviewed.   Constitutional:       General: She is not in acute distress.     Appearance: Normal appearance. She is well-developed and overweight.   HENT:      Head: Normocephalic and atraumatic.   Neck:      Thyroid: No thyroid mass, thyromegaly or thyroid tenderness.      Trachea: Trachea normal.   Cardiovascular:      Rate and Rhythm: Normal rate and regular rhythm.      Pulses: Normal pulses.      Heart sounds: No murmur heard.     Pulmonary:      Effort: Pulmonary effort is normal.      Breath sounds: Normal breath sounds.   Chest:      Chest wall: No mass or tenderness.      Breasts: Breasts are symmetrical.         Right: Inverted nipple present. No swelling, " mass, nipple discharge, skin change or tenderness.         Left: Inverted nipple present. No swelling, mass, nipple discharge, skin change or tenderness.   Musculoskeletal:         General: Normal range of motion.      Cervical back: Normal range of motion and neck supple.      Right lower leg: No edema.      Left lower leg: No edema.   Lymphadenopathy:      Cervical: No cervical adenopathy.      Upper Body:      Right upper body: No supraclavicular, axillary or pectoral adenopathy.      Left upper body: No supraclavicular, axillary or pectoral adenopathy.   Skin:     General: Skin is warm and dry.      Comments: Lower extremity varicosities   Neurological:      Mental Status: She is alert and oriented to person, place, and time.   Psychiatric:         Mood and Affect: Mood and affect normal.         Behavior: Behavior normal.         Thought Content: Thought content normal.         Judgment: Judgment normal.         Result Review :     The following data was reviewed by: BETSY Rocha on 06/22/2021:        Data reviewed: Radiologic studies Last mammogram and DEXA              Assessment and Plan      Diagnoses and all orders for this visit:    1. Encounter for screening mammogram for breast cancer (Primary)  -     Mammo Screening Bilateral With CAD; Future    2. Postmenopause  -     DEXA Bone Density Axial; Future            Follow Up     Return for Medicare Wellness.  In the interim, we will arrange for both mammogram and bone density testing.    Patient was given instructions and counseling regarding her condition or for health maintenance advice. Please see specific information pulled into the AVS if appropriate.

## 2021-06-29 ENCOUNTER — HOSPITAL ENCOUNTER (OUTPATIENT)
Dept: MAMMOGRAPHY | Facility: HOSPITAL | Age: 76
Discharge: HOME | End: 2021-06-29

## 2021-06-29 ENCOUNTER — HOSPITAL ENCOUNTER (OUTPATIENT)
Dept: BONE DENSITY | Facility: HOSPITAL | Age: 76
Discharge: HOME | End: 2021-06-29

## 2021-06-29 DIAGNOSIS — Z78.0 POSTMENOPAUSE: ICD-10-CM

## 2021-06-29 DIAGNOSIS — Z12.31 ENCOUNTER FOR SCREENING MAMMOGRAM FOR BREAST CANCER: ICD-10-CM

## 2021-06-29 PROCEDURE — 77080 DXA BONE DENSITY AXIAL: CPT

## 2021-06-29 PROCEDURE — 77067 SCR MAMMO BI INCL CAD: CPT

## 2021-07-14 ENCOUNTER — OFFICE VISIT (OUTPATIENT)
Dept: FAMILY MEDICINE CLINIC | Facility: CLINIC | Age: 76
End: 2021-07-14

## 2021-07-14 VITALS
BODY MASS INDEX: 28.51 KG/M2 | DIASTOLIC BLOOD PRESSURE: 80 MMHG | TEMPERATURE: 96.2 F | SYSTOLIC BLOOD PRESSURE: 120 MMHG | WEIGHT: 151 LBS | HEIGHT: 61 IN | OXYGEN SATURATION: 97 % | HEART RATE: 92 BPM

## 2021-07-14 DIAGNOSIS — I10 ESSENTIAL (PRIMARY) HYPERTENSION: ICD-10-CM

## 2021-07-14 DIAGNOSIS — Z12.11 SCREENING FOR COLON CANCER: ICD-10-CM

## 2021-07-14 DIAGNOSIS — Z00.00 ENCOUNTER FOR MEDICARE ANNUAL WELLNESS EXAM: Primary | ICD-10-CM

## 2021-07-14 DIAGNOSIS — E78.5 HYPERLIPIDEMIA, UNSPECIFIED HYPERLIPIDEMIA TYPE: ICD-10-CM

## 2021-07-14 DIAGNOSIS — M85.80 OSTEOPENIA DETERMINED BY X-RAY: ICD-10-CM

## 2021-07-14 DIAGNOSIS — N18.31 STAGE 3A CHRONIC KIDNEY DISEASE (HCC): ICD-10-CM

## 2021-07-14 DIAGNOSIS — Z11.59 NEED FOR HEPATITIS C SCREENING TEST: ICD-10-CM

## 2021-07-14 LAB
25(OH)D3 SERPL-MCNC: 35.4 NG/ML (ref 30–100)
ALBUMIN SERPL-MCNC: 4.3 G/DL (ref 3.5–5.2)
ALBUMIN/GLOB SERPL: 1.7 G/DL
ALP SERPL-CCNC: 106 U/L (ref 39–117)
ALT SERPL W P-5'-P-CCNC: 23 U/L (ref 1–33)
ANION GAP SERPL CALCULATED.3IONS-SCNC: 8.9 MMOL/L (ref 5–15)
AST SERPL-CCNC: 23 U/L (ref 1–32)
BILIRUB SERPL-MCNC: 1.1 MG/DL (ref 0–1.2)
BUN SERPL-MCNC: 16 MG/DL (ref 8–23)
BUN/CREAT SERPL: 15.4 (ref 7–25)
CALCIUM SPEC-SCNC: 9.2 MG/DL (ref 8.6–10.5)
CHLORIDE SERPL-SCNC: 104 MMOL/L (ref 98–107)
CHOLEST SERPL-MCNC: 148 MG/DL (ref 0–200)
CO2 SERPL-SCNC: 26.1 MMOL/L (ref 22–29)
CREAT SERPL-MCNC: 1.04 MG/DL (ref 0.57–1)
GFR SERPL CREATININE-BSD FRML MDRD: 52 ML/MIN/1.73
GLOBULIN UR ELPH-MCNC: 2.5 GM/DL
GLUCOSE SERPL-MCNC: 113 MG/DL (ref 65–99)
HCV AB SER DONR QL: NORMAL
HDLC SERPL-MCNC: 54 MG/DL (ref 40–60)
LDLC SERPL CALC-MCNC: 71 MG/DL (ref 0–100)
LDLC/HDLC SERPL: 1.26 {RATIO}
MAGNESIUM SERPL-MCNC: 2.1 MG/DL (ref 1.6–2.4)
PHOSPHATE SERPL-MCNC: 3.8 MG/DL (ref 2.5–4.5)
POTASSIUM SERPL-SCNC: 4.9 MMOL/L (ref 3.5–5.2)
PROT SERPL-MCNC: 6.8 G/DL (ref 6–8.5)
SODIUM SERPL-SCNC: 139 MMOL/L (ref 136–145)
TRIGL SERPL-MCNC: 130 MG/DL (ref 0–150)
VLDLC SERPL-MCNC: 23 MG/DL (ref 5–40)

## 2021-07-14 PROCEDURE — 86803 HEPATITIS C AB TEST: CPT | Performed by: NURSE PRACTITIONER

## 2021-07-14 PROCEDURE — G0439 PPPS, SUBSEQ VISIT: HCPCS | Performed by: NURSE PRACTITIONER

## 2021-07-14 PROCEDURE — 80061 LIPID PANEL: CPT | Performed by: NURSE PRACTITIONER

## 2021-07-14 PROCEDURE — 82306 VITAMIN D 25 HYDROXY: CPT | Performed by: NURSE PRACTITIONER

## 2021-07-14 PROCEDURE — 83735 ASSAY OF MAGNESIUM: CPT | Performed by: NURSE PRACTITIONER

## 2021-07-14 PROCEDURE — 80053 COMPREHEN METABOLIC PANEL: CPT | Performed by: NURSE PRACTITIONER

## 2021-07-14 PROCEDURE — 96160 PT-FOCUSED HLTH RISK ASSMT: CPT | Performed by: NURSE PRACTITIONER

## 2021-07-14 PROCEDURE — 99213 OFFICE O/P EST LOW 20 MIN: CPT | Performed by: NURSE PRACTITIONER

## 2021-07-14 PROCEDURE — 1159F MED LIST DOCD IN RCRD: CPT | Performed by: NURSE PRACTITIONER

## 2021-07-14 PROCEDURE — 1170F FXNL STATUS ASSESSED: CPT | Performed by: NURSE PRACTITIONER

## 2021-07-14 PROCEDURE — 84100 ASSAY OF PHOSPHORUS: CPT | Performed by: NURSE PRACTITIONER

## 2021-07-14 NOTE — PATIENT INSTRUCTIONS
Preventive Care 65 Years and Older, Female  Preventive care refers to lifestyle choices and visits with your health care provider that can promote health and wellness. This includes:  · A yearly physical exam. This is also called an annual well check.  · Regular dental and eye exams.  · Immunizations.  · Screening for certain conditions.  · Healthy lifestyle choices, such as diet and exercise.  What can I expect for my preventive care visit?  Physical exam  Your health care provider will check:  · Height and weight. These may be used to calculate body mass index (BMI), which is a measurement that tells if you are at a healthy weight.  · Heart rate and blood pressure.  · Your skin for abnormal spots.  Counseling  Your health care provider may ask you questions about:  · Alcohol, tobacco, and drug use.  · Emotional well-being.  · Home and relationship well-being.  · Sexual activity.  · Eating habits.  · History of falls.  · Memory and ability to understand (cognition).  · Work and work environment.  · Pregnancy and menstrual history.  What immunizations do I need?    Influenza (flu) vaccine  · This is recommended every year.  Tetanus, diphtheria, and pertussis (Tdap) vaccine  · You may need a Td booster every 10 years.  Varicella (chickenpox) vaccine  · You may need this vaccine if you have not already been vaccinated.  Zoster (shingles) vaccine  · You may need this after age 60.  Pneumococcal conjugate (PCV13) vaccine  · One dose is recommended after age 65.  Pneumococcal polysaccharide (PPSV23) vaccine  · One dose is recommended after age 65.  Measles, mumps, and rubella (MMR) vaccine  · You may need at least one dose of MMR if you were born in 1957 or later. You may also need a second dose.  Meningococcal conjugate (MenACWY) vaccine  · You may need this if you have certain conditions.  Hepatitis A vaccine  · You may need this if you have certain conditions or if you travel or work in places where you may be exposed  to hepatitis A.  Hepatitis B vaccine  · You may need this if you have certain conditions or if you travel or work in places where you may be exposed to hepatitis B.  Haemophilus influenzae type b (Hib) vaccine  · You may need this if you have certain conditions.  You may receive vaccines as individual doses or as more than one vaccine together in one shot (combination vaccines). Talk with your health care provider about the risks and benefits of combination vaccines.  What tests do I need?  Blood tests  · Lipid and cholesterol levels. These may be checked every 5 years, or more frequently depending on your overall health.  · Hepatitis C test.  · Hepatitis B test.  Screening  · Lung cancer screening. You may have this screening every year starting at age 55 if you have a 30-pack-year history of smoking and currently smoke or have quit within the past 15 years.  · Colorectal cancer screening. All adults should have this screening starting at age 50 and continuing until age 75. Your health care provider may recommend screening at age 45 if you are at increased risk. You will have tests every 1-10 years, depending on your results and the type of screening test.  · Diabetes screening. This is done by checking your blood sugar (glucose) after you have not eaten for a while (fasting). You may have this done every 1-3 years.  · Mammogram. This may be done every 1-2 years. Talk with your health care provider about how often you should have regular mammograms.  · BRCA-related cancer screening. This may be done if you have a family history of breast, ovarian, tubal, or peritoneal cancers.  Other tests  · Sexually transmitted disease (STD) testing.  · Bone density scan. This is done to screen for osteoporosis. You may have this done starting at age 65.  Follow these instructions at home:  Eating and drinking  · Eat a diet that includes fresh fruits and vegetables, whole grains, lean protein, and low-fat dairy products. Limit  your intake of foods with high amounts of sugar, saturated fats, and salt.  · Take vitamin and mineral supplements as recommended by your health care provider.  · Do not drink alcohol if your health care provider tells you not to drink.  · If you drink alcohol:  ? Limit how much you have to 0-1 drink a day.  ? Be aware of how much alcohol is in your drink. In the [place] one drink equals one 12 oz bottle of beer (355 mL), one 5 oz glass of wine (148 mL), or one 1½ oz glass of hard liquor (44 mL).  Lifestyle  · Take daily care of your teeth and gums.  · Stay active. Exercise for at least 30 minutes on 5 or more days each week.  · Do not use any products that contain nicotine or tobacco, such as cigarettes, e-cigarettes, and chewing tobacco. If you need help quitting, ask your health care provider.  · If you are sexually active, practice safe sex. Use a condom or other form of protection in order to prevent STIs (sexually transmitted infections).  · Talk with your health care provider about taking a low-dose aspirin or statin.  What's next?  · Go to your health care provider once a year for a well check visit.  · Ask your health care provider how often you should have your eyes and teeth checked.  · Stay up to date on all vaccines.  This information is not intended to replace advice given to you by your health care provider. Make sure you discuss any questions you have with your health care provider.  Document Revised: 12/12/2019 Document Reviewed: 12/12/2019  [employer] Patient Education © 2020 [employer]    Osteopenia    Osteopenia is a loss of thickness (density) inside of the bones. Another name for osteopenia is low bone mass.  Mild osteopenia is a normal part of aging. It is not a disease, and it does not cause symptoms. However, if you have osteopenia and continue to lose bone mass, you could develop a condition that causes the bones to become thin and break more easily (osteoporosis). You may also lose some  height, have back pain, and have a stooped posture. Although osteopenia is not a disease, making changes to your lifestyle and diet can help to prevent osteopenia from developing into osteoporosis.  What are the causes?  Osteopenia is caused by loss of calcium in the bones.   Bones are constantly changing. Old bone cells are continually being replaced with new bone cells. This process builds new bone. The mineral calcium is needed to build new bone and maintain bone density. Bone density is usually highest around age 35. After that, most people's bodies cannot replace all the bone they have lost with new bone.  What increases the risk?  You are more likely to develop this condition if:  · You are older than age 50.  · You are a woman who went through menopause early.  · You have a long illness that keeps you in bed.  · You do not get enough exercise.  · You lack certain nutrients (malnutrition).  · You have an overactive thyroid gland (hyperthyroidism).  · You smoke.  · You drink a lot of alcohol.  · You are taking medicines that weaken the bones, such as steroids.  What are the signs or symptoms?  This condition does not cause any symptoms. You may have a slightly higher risk for bone breaks (fractures), so getting fractures more easily than normal may be an indication of osteopenia.  How is this diagnosed?  Your health care provider can diagnose this condition with a special type of X-ray exam that measures bone density (dual-energy X-ray absorptiometry, DEXA). This test can measure bone density in your hips, spine, and wrists.  Osteopenia has no symptoms, so this condition is usually diagnosed after a routine bone density screening test is done for osteoporosis. This routine screening is usually done for:  · Women who are age 65 or older.  · Men who are age 70 or older.  If you have risk factors for osteopenia, you may have the screening test at an earlier age.  How is this treated?  Making dietary and lifestyle  changes can lower your risk for osteoporosis. If you have severe osteopenia that is close to becoming osteoporosis, your health care provider may prescribe medicines and dietary supplements such as calcium and vitamin D. These supplements help to rebuild bone density.  Follow these instructions at home:    · Take over-the-counter and prescription medicines only as told by your health care provider. These include vitamins and supplements.  · Eat a diet that is high in calcium and vitamin D.  ? Calcium is found in dairy products, beans, salmon, and leafy green vegetables like spinach and broccoli.  ? Look for foods that have vitamin D and calcium added to them (fortified foods), such as orange juice, cereal, and bread.  · Do 30 or more minutes of a weight-bearing exercise every day, such as walking, jogging, or playing a sport. These types of exercises strengthen the bones.  · Take precautions at home to lower your risk of falling, such as:  ? Keeping rooms well-lit and free of clutter, such as cords.  ? Installing safety rails on stairs.  ? Using rubber mats in the bathroom or other areas that are often wet or slippery.  · Do not use any products that contain nicotine or tobacco, such as cigarettes and e-cigarettes. If you need help quitting, ask your health care provider.  · Avoid alcohol or limit alcohol intake to no more than 1 drink a day for nonpregnant women and 2 drinks a day for men. One drink equals 12 oz of beer, 5 oz of wine, or 1½ oz of hard liquor.  · Keep all follow-up visits as told by your health care provider. This is important.  Contact a health care provider if:  · You have not had a bone density screening for osteoporosis and you are:  ? A woman, age 65 or older.  ? A man, age 70 or older.  · You are a postmenopausal woman who has not had a bone density screening for osteoporosis.  · You are older than age 50 and you want to know if you should have bone density screening for  osteoporosis.  Summary  · Osteopenia is a loss of thickness (density) inside of the bones. Another name for osteopenia is low bone mass.  · Osteopenia is not a disease, but it may increase your risk for a condition that causes the bones to become thin and break more easily (osteoporosis).  · You may be at risk for osteopenia if you are older than age 50 or if you are a woman who went through early menopause.  · Osteopenia does not cause any symptoms, but it can be diagnosed with a bone density screening test.  · Dietary and lifestyle changes are the first treatment for osteopenia. These may lower your risk for osteoporosis.  This information is not intended to replace advice given to you by your health care provider. Make sure you discuss any questions you have with your health care provider.  Document Revised: 11/30/2018 Document Reviewed: 09/26/2018  [employer] Patient Education © 2021 [employer]

## 2021-07-14 NOTE — PROGRESS NOTES
The ABCs of the Annual Wellness Visit  Subsequent Medicare Wellness Visit    Chief Complaint   Patient presents with   • Medicare Wellness-subsequent       Subjective   History of Present Illness:  Deanne Pittman is a 75 y.o. female who presents for a Subsequent Medicare Wellness Visit.    She is also fasting for blood work this AM -     Discuss DEXA - severe osteopenia - not taking supplements currently.     HEALTH RISK ASSESSMENT    Recent Hospitalizations:  No hospitalization(s) within the last year.    Current Medical Providers:  Patient Care Team:  Huyen Damon APRN as PCP - General (Nurse Practitioner)    Smoking Status:  Social History     Tobacco Use   Smoking Status Former Smoker   • Packs/day: 0.50   • Quit date: 2019   • Years since quittin.5   Smokeless Tobacco Never Used       Alcohol Consumption:  Social History     Substance and Sexual Activity   Alcohol Use None       Depression Screen:   PHQ-2/PHQ-9 Depression Screening 2021   Little interest or pleasure in doing things 0   Feeling down, depressed, or hopeless 0   Total Score 0       Fall Risk Screen:  ASYA Fall Risk Assessment was completed, and patient is at LOW risk for falls.Assessment completed on:2021    Health Habits and Functional and Cognitive Screening:  Functional & Cognitive Status 2021   Do you have difficulty preparing food and eating? No   Do you have difficulty bathing yourself, getting dressed or grooming yourself? No   Do you have difficulty using the toilet? No   Do you have difficulty moving around from place to place? No   Do you have trouble with steps or getting out of a bed or a chair? No   Current Diet Well Balanced Diet   Dental Exam Up to date   Eye Exam Not up to date   Exercise (times per week) 7 times per week   Current Exercises Include Walking   Do you need help using the phone?  No   Are you deaf or do you have serious difficulty hearing?  No   Do you need help with transportation?  No   Do you need help shopping? No   Do you need help preparing meals?  No   Do you need help with housework?  No   Do you need help with laundry? No   Do you need help taking your medications? No   Do you need help managing money? No   Do you ever drive or ride in a car without wearing a seat belt? No   Have you felt unusual stress, anger or loneliness in the last month? No   Who do you live with? Alone   If you need help, do you have trouble finding someone available to you? No   Have you been bothered in the last four weeks by sexual problems? No   Do you have difficulty concentrating, remembering or making decisions? No         Does the patient have evidence of cognitive impairment? No    Asprin use counseling:Contraindicated from taking ASA    Age-appropriate Screening Schedule:  Refer to the list below for future screening recommendations based on patient's age, sex and/or medical conditions. Orders for these recommended tests are listed in the plan section. The patient has been provided with a written plan.    Health Maintenance   Topic Date Due   • ZOSTER VACCINE (2 of 2) 10/24/2018   • INFLUENZA VACCINE  08/01/2021   • LIPID PANEL  01/04/2022   • MAMMOGRAM  06/29/2023   • DXA SCAN  06/29/2023   • TDAP/TD VACCINES (2 - Td or Tdap) 01/18/2029          The following portions of the patient's history were reviewed and updated as appropriate: allergies, current medications, past family history, past medical history, past social history, past surgical history and problem list.    Outpatient Medications Prior to Visit   Medication Sig Dispense Refill   • atorvastatin (LIPITOR) 20 MG tablet Take 1 tablet by mouth Every Night. Take once daily at bedtime 90 tablet 0   • carvedilol (COREG) 6.25 MG tablet Take 1 tablet by mouth 2 (two) times a day.     • cetirizine (zyrTEC) 10 MG tablet Take 10 mg by mouth Daily.     • cyanocobalamin (VITAMIN B-12) 1000 MCG tablet Take 1,000 mcg by mouth.     • losartan (COZAAR) 25 MG  "tablet Take 1 tablet by mouth Daily.       No facility-administered medications prior to visit.       Patient Active Problem List   Diagnosis   • Cardiomyopathy (CMS/HCC)   • Chronic venous insufficiency   • Hyperlipidemia   • Essential (primary) hypertension   • Stage III chronic kidney disease (CMS/HCC)       Advanced Care Planning:  ACP discussion was held with the patient during this visit. Patient has an advance directive in EMR which is still valid.     Review of Systems    Compared to one year ago, the patient feels her physical health is the same.  Compared to one year ago, the patient feels her mental health is the same.    Reviewed chart for potential of high risk medication in the elderly: yes  Reviewed chart for potential of harmful drug interactions in the elderly:yes    Objective         Vitals:    07/14/21 0845   BP: 120/80   Pulse: 92   Temp: 96.2 °F (35.7 °C)   SpO2: 97%   Weight: 68.5 kg (151 lb)   Height: 154.9 cm (61\")   PainSc:   1       Body mass index is 28.53 kg/m².  Discussed the patient's BMI with her. The BMI is in the acceptable range.    Physical Exam  Vitals reviewed.   Constitutional:       General: She is not in acute distress.     Appearance: Normal appearance. She is well-developed.      Comments: Overweight by BMI   HENT:      Head: Normocephalic and atraumatic.   Eyes:      General: No scleral icterus.     Conjunctiva/sclera: Conjunctivae normal.      Pupils: Pupils are equal, round, and reactive to light.   Neck:      Thyroid: No thyroid mass, thyromegaly or thyroid tenderness.      Trachea: Trachea normal.   Cardiovascular:      Rate and Rhythm: Normal rate and regular rhythm.      Pulses: Normal pulses.      Heart sounds: No murmur heard.     Pulmonary:      Effort: Pulmonary effort is normal.      Breath sounds: Normal breath sounds. No wheezing or rhonchi.   Abdominal:      General: Bowel sounds are normal. There is no distension.      Palpations: Abdomen is soft. There is no " mass.      Tenderness: There is no abdominal tenderness.   Musculoskeletal:         General: Normal range of motion.      Cervical back: Normal range of motion and neck supple.      Right lower leg: No edema.      Left lower leg: No edema.   Lymphadenopathy:      Cervical: No cervical adenopathy.   Skin:     General: Skin is warm and dry.      Comments: BLE varicosities present   Neurological:      Mental Status: She is alert and oriented to person, place, and time.   Psychiatric:         Mood and Affect: Mood and affect normal.         Behavior: Behavior normal.         Thought Content: Thought content normal.         Judgment: Judgment normal.             Assessment/Plan   Medicare Risks and Personalized Health Plan  CMS Preventative Services Quick Reference  Advance Directive Discussion  Breast Cancer/Mammogram Screening  Cardiovascular risk  Colon Cancer Screening  Fall Risk  Glaucoma Risk  Immunizations Discussed/Encouraged (specific immunizations; Shingrix )  Osteoporosis Risk    The above risks/problems have been discussed with the patient.  Pertinent information has been shared with the patient in the After Visit Summary.  Follow up plans and orders are seen below in the Assessment/Plan Section.    Diagnoses and all orders for this visit:    1. Encounter for Medicare annual wellness exam (Primary)    2. Screening for colon cancer  -     Ambulatory Referral For Screening Colonoscopy    3. Essential (primary) hypertension  -     Comprehensive metabolic panel; Future    4. Stage 3a chronic kidney disease (CMS/HCC)  -     Comprehensive metabolic panel; Future    5. Hyperlipidemia, unspecified hyperlipidemia type  -     Comprehensive metabolic panel; Future  -     Lipid Panel    6. Need for hepatitis C screening test  -     Hepatitis C Antibody    7. Osteopenia determined by x-ray  Comments:  Rec'd calcium plus vitamin D - she states nephrology doesn't want her on vitamins - she will discuss with him d/t she does  not wish to take osteoporosis meds.   Orders:  -     Phosphorus  -     Magnesium  -     Vitamin D 25 Hydroxy      Follow Up:  Return in about 6 months (around 1/14/2022) for Next scheduled follow up.     Living will/advanced directive on the chart.     Mammogram UTD.     CRC screening due - will refer.     Encouraged eye exam for glaucoma screening. It has been almost two years since her last eye exam.     An After Visit Summary and PPPS were given to the patient.

## 2021-08-26 DIAGNOSIS — E78.2 MIXED HYPERLIPIDEMIA: Primary | ICD-10-CM

## 2021-08-27 RX ORDER — ATORVASTATIN CALCIUM 20 MG/1
TABLET, FILM COATED ORAL
Qty: 90 TABLET | Refills: 1 | Status: SHIPPED | OUTPATIENT
Start: 2021-08-27 | End: 2022-01-11 | Stop reason: SDUPTHER

## 2021-10-13 ENCOUNTER — TRANSCRIBE ORDERS (OUTPATIENT)
Dept: ADMINISTRATIVE | Facility: HOSPITAL | Age: 76
End: 2021-10-13

## 2021-10-13 ENCOUNTER — LAB (OUTPATIENT)
Dept: LAB | Facility: HOSPITAL | Age: 76
End: 2021-10-13

## 2021-10-13 DIAGNOSIS — I10 ESSENTIAL HYPERTENSION, MALIGNANT: ICD-10-CM

## 2021-10-13 DIAGNOSIS — N18.30 STAGE 3 CHRONIC KIDNEY DISEASE, UNSPECIFIED WHETHER STAGE 3A OR 3B CKD (HCC): Primary | ICD-10-CM

## 2021-10-13 DIAGNOSIS — N18.30 STAGE 3 CHRONIC KIDNEY DISEASE, UNSPECIFIED WHETHER STAGE 3A OR 3B CKD (HCC): ICD-10-CM

## 2021-10-13 LAB
ALBUMIN SERPL-MCNC: 4.2 G/DL (ref 3.5–5.2)
ALBUMIN UR-MCNC: <1.2 MG/DL
ALBUMIN/GLOB SERPL: 1.8 G/DL
ALP SERPL-CCNC: 93 U/L (ref 39–117)
ALT SERPL W P-5'-P-CCNC: 23 U/L (ref 1–33)
ANION GAP SERPL CALCULATED.3IONS-SCNC: 10.3 MMOL/L (ref 5–15)
AST SERPL-CCNC: 25 U/L (ref 1–32)
BACTERIA UR QL AUTO: ABNORMAL /HPF
BASOPHILS # BLD AUTO: 0.06 10*3/MM3 (ref 0–0.2)
BASOPHILS NFR BLD AUTO: 0.7 % (ref 0–1.5)
BILIRUB SERPL-MCNC: 0.6 MG/DL (ref 0–1.2)
BILIRUB UR QL STRIP: NEGATIVE
BUN SERPL-MCNC: 22 MG/DL (ref 8–23)
BUN/CREAT SERPL: 15.6 (ref 7–25)
CALCIUM SPEC-SCNC: 9 MG/DL (ref 8.6–10.5)
CHLORIDE SERPL-SCNC: 103 MMOL/L (ref 98–107)
CLARITY UR: CLEAR
CO2 SERPL-SCNC: 24.7 MMOL/L (ref 22–29)
COLOR UR: YELLOW
CREAT SERPL-MCNC: 1.41 MG/DL (ref 0.57–1)
CREAT UR-MCNC: 46.6 MG/DL
CREAT UR-MCNC: 46.6 MG/DL
DEPRECATED RDW RBC AUTO: 45.9 FL (ref 37–54)
EOSINOPHIL # BLD AUTO: 0.18 10*3/MM3 (ref 0–0.4)
EOSINOPHIL NFR BLD AUTO: 2.2 % (ref 0.3–6.2)
ERYTHROCYTE [DISTWIDTH] IN BLOOD BY AUTOMATED COUNT: 13.2 % (ref 12.3–15.4)
GFR SERPL CREATININE-BSD FRML MDRD: 36 ML/MIN/1.73
GLOBULIN UR ELPH-MCNC: 2.4 GM/DL
GLUCOSE SERPL-MCNC: 130 MG/DL (ref 65–99)
GLUCOSE UR STRIP-MCNC: NEGATIVE MG/DL
HCT VFR BLD AUTO: 41.8 % (ref 34–46.6)
HGB BLD-MCNC: 13.3 G/DL (ref 12–15.9)
HGB UR QL STRIP.AUTO: NEGATIVE
HYALINE CASTS UR QL AUTO: ABNORMAL /LPF
IMM GRANULOCYTES # BLD AUTO: 0.06 10*3/MM3 (ref 0–0.05)
IMM GRANULOCYTES NFR BLD AUTO: 0.7 % (ref 0–0.5)
KETONES UR QL STRIP: NEGATIVE
LEUKOCYTE ESTERASE UR QL STRIP.AUTO: ABNORMAL
LYMPHOCYTES # BLD AUTO: 1.81 10*3/MM3 (ref 0.7–3.1)
LYMPHOCYTES NFR BLD AUTO: 22.4 % (ref 19.6–45.3)
MCH RBC QN AUTO: 30.2 PG (ref 26.6–33)
MCHC RBC AUTO-ENTMCNC: 31.8 G/DL (ref 31.5–35.7)
MCV RBC AUTO: 94.8 FL (ref 79–97)
MICROALBUMIN/CREAT UR: NORMAL MG/G{CREAT}
MONOCYTES # BLD AUTO: 0.78 10*3/MM3 (ref 0.1–0.9)
MONOCYTES NFR BLD AUTO: 9.7 % (ref 5–12)
NEUTROPHILS NFR BLD AUTO: 5.18 10*3/MM3 (ref 1.7–7)
NEUTROPHILS NFR BLD AUTO: 64.3 % (ref 42.7–76)
NITRITE UR QL STRIP: NEGATIVE
NRBC BLD AUTO-RTO: 0 /100 WBC (ref 0–0.2)
PH UR STRIP.AUTO: 6 [PH] (ref 5–8)
PLATELET # BLD AUTO: 277 10*3/MM3 (ref 140–450)
PMV BLD AUTO: 12.6 FL (ref 6–12)
POTASSIUM SERPL-SCNC: 4.5 MMOL/L (ref 3.5–5.2)
PROT SERPL-MCNC: 6.6 G/DL (ref 6–8.5)
PROT UR QL STRIP: NEGATIVE
PROT UR-MCNC: <4 MG/DL
PROT/CREAT UR: NORMAL MG/G{CREAT}
RBC # BLD AUTO: 4.41 10*6/MM3 (ref 3.77–5.28)
RBC # UR: ABNORMAL /HPF
REF LAB TEST METHOD: ABNORMAL
SODIUM SERPL-SCNC: 138 MMOL/L (ref 136–145)
SP GR UR STRIP: 1.01 (ref 1–1.03)
SQUAMOUS #/AREA URNS HPF: ABNORMAL /HPF
UROBILINOGEN UR QL STRIP: ABNORMAL
WBC # BLD AUTO: 8.07 10*3/MM3 (ref 3.4–10.8)
WBC UR QL AUTO: ABNORMAL /HPF

## 2021-10-13 PROCEDURE — 85025 COMPLETE CBC W/AUTO DIFF WBC: CPT

## 2021-10-13 PROCEDURE — 36415 COLL VENOUS BLD VENIPUNCTURE: CPT

## 2021-10-13 PROCEDURE — 82043 UR ALBUMIN QUANTITATIVE: CPT

## 2021-10-13 PROCEDURE — 81001 URINALYSIS AUTO W/SCOPE: CPT

## 2021-10-13 PROCEDURE — 84156 ASSAY OF PROTEIN URINE: CPT

## 2021-10-13 PROCEDURE — 82570 ASSAY OF URINE CREATININE: CPT

## 2021-10-13 PROCEDURE — 80053 COMPREHEN METABOLIC PANEL: CPT

## 2021-10-23 ENCOUNTER — CLINICAL SUPPORT (OUTPATIENT)
Dept: FAMILY MEDICINE CLINIC | Facility: CLINIC | Age: 76
End: 2021-10-23

## 2021-10-23 DIAGNOSIS — Z23 NEED FOR INFLUENZA VACCINATION: Primary | ICD-10-CM

## 2021-10-23 PROCEDURE — G0008 ADMIN INFLUENZA VIRUS VAC: HCPCS | Performed by: NURSE PRACTITIONER

## 2021-10-23 PROCEDURE — 90662 IIV NO PRSV INCREASED AG IM: CPT | Performed by: NURSE PRACTITIONER

## 2022-01-11 ENCOUNTER — OFFICE VISIT (OUTPATIENT)
Dept: FAMILY MEDICINE CLINIC | Facility: CLINIC | Age: 77
End: 2022-01-11

## 2022-01-11 VITALS
TEMPERATURE: 96 F | WEIGHT: 146 LBS | HEART RATE: 79 BPM | OXYGEN SATURATION: 100 % | BODY MASS INDEX: 25.87 KG/M2 | DIASTOLIC BLOOD PRESSURE: 84 MMHG | HEIGHT: 63 IN | SYSTOLIC BLOOD PRESSURE: 120 MMHG

## 2022-01-11 DIAGNOSIS — R73.01 ELEVATED FASTING BLOOD SUGAR: ICD-10-CM

## 2022-01-11 DIAGNOSIS — E78.2 MIXED HYPERLIPIDEMIA: ICD-10-CM

## 2022-01-11 DIAGNOSIS — N18.31 STAGE 3A CHRONIC KIDNEY DISEASE: ICD-10-CM

## 2022-01-11 DIAGNOSIS — I10 ESSENTIAL (PRIMARY) HYPERTENSION: Primary | ICD-10-CM

## 2022-01-11 DIAGNOSIS — M85.80 OSTEOPENIA DETERMINED BY X-RAY: ICD-10-CM

## 2022-01-11 LAB
ALBUMIN SERPL-MCNC: 4.7 G/DL (ref 3.5–5.2)
ALBUMIN/GLOB SERPL: 2 G/DL
ALP SERPL-CCNC: 116 U/L (ref 39–117)
ALT SERPL W P-5'-P-CCNC: 19 U/L (ref 1–33)
ANION GAP SERPL CALCULATED.3IONS-SCNC: 7.6 MMOL/L (ref 5–15)
AST SERPL-CCNC: 17 U/L (ref 1–32)
BASOPHILS # BLD AUTO: 0.07 10*3/MM3 (ref 0–0.2)
BASOPHILS NFR BLD AUTO: 0.9 % (ref 0–1.5)
BILIRUB SERPL-MCNC: 1.1 MG/DL (ref 0–1.2)
BUN SERPL-MCNC: 15 MG/DL (ref 8–23)
BUN/CREAT SERPL: 14.2 (ref 7–25)
CALCIUM SPEC-SCNC: 9.8 MG/DL (ref 8.6–10.5)
CHLORIDE SERPL-SCNC: 104 MMOL/L (ref 98–107)
CHOLEST SERPL-MCNC: 153 MG/DL (ref 0–200)
CO2 SERPL-SCNC: 29.4 MMOL/L (ref 22–29)
CREAT SERPL-MCNC: 1.06 MG/DL (ref 0.57–1)
DEPRECATED RDW RBC AUTO: 41.2 FL (ref 37–54)
EOSINOPHIL # BLD AUTO: 0.15 10*3/MM3 (ref 0–0.4)
EOSINOPHIL NFR BLD AUTO: 2 % (ref 0.3–6.2)
ERYTHROCYTE [DISTWIDTH] IN BLOOD BY AUTOMATED COUNT: 12.4 % (ref 12.3–15.4)
GFR SERPL CREATININE-BSD FRML MDRD: 51 ML/MIN/1.73
GLOBULIN UR ELPH-MCNC: 2.3 GM/DL
GLUCOSE SERPL-MCNC: 116 MG/DL (ref 65–99)
HCT VFR BLD AUTO: 42.4 % (ref 34–46.6)
HDLC SERPL-MCNC: 65 MG/DL (ref 40–60)
HGB BLD-MCNC: 14.2 G/DL (ref 12–15.9)
IMM GRANULOCYTES # BLD AUTO: 0.02 10*3/MM3 (ref 0–0.05)
IMM GRANULOCYTES NFR BLD AUTO: 0.3 % (ref 0–0.5)
LDLC SERPL CALC-MCNC: 67 MG/DL (ref 0–100)
LDLC/HDLC SERPL: 0.99 {RATIO}
LYMPHOCYTES # BLD AUTO: 1.88 10*3/MM3 (ref 0.7–3.1)
LYMPHOCYTES NFR BLD AUTO: 24.9 % (ref 19.6–45.3)
MCH RBC QN AUTO: 30.9 PG (ref 26.6–33)
MCHC RBC AUTO-ENTMCNC: 33.5 G/DL (ref 31.5–35.7)
MCV RBC AUTO: 92.4 FL (ref 79–97)
MONOCYTES # BLD AUTO: 0.62 10*3/MM3 (ref 0.1–0.9)
MONOCYTES NFR BLD AUTO: 8.2 % (ref 5–12)
NEUTROPHILS NFR BLD AUTO: 4.8 10*3/MM3 (ref 1.7–7)
NEUTROPHILS NFR BLD AUTO: 63.7 % (ref 42.7–76)
NRBC BLD AUTO-RTO: 0 /100 WBC (ref 0–0.2)
PLATELET # BLD AUTO: 322 10*3/MM3 (ref 140–450)
PMV BLD AUTO: 12.3 FL (ref 6–12)
POTASSIUM SERPL-SCNC: 5 MMOL/L (ref 3.5–5.2)
PROT SERPL-MCNC: 7 G/DL (ref 6–8.5)
RBC # BLD AUTO: 4.59 10*6/MM3 (ref 3.77–5.28)
SODIUM SERPL-SCNC: 141 MMOL/L (ref 136–145)
TRIGL SERPL-MCNC: 118 MG/DL (ref 0–150)
VLDLC SERPL-MCNC: 21 MG/DL (ref 5–40)
WBC NRBC COR # BLD: 7.54 10*3/MM3 (ref 3.4–10.8)

## 2022-01-11 PROCEDURE — 83036 HEMOGLOBIN GLYCOSYLATED A1C: CPT | Performed by: NURSE PRACTITIONER

## 2022-01-11 PROCEDURE — 80061 LIPID PANEL: CPT | Performed by: NURSE PRACTITIONER

## 2022-01-11 PROCEDURE — 85025 COMPLETE CBC W/AUTO DIFF WBC: CPT | Performed by: NURSE PRACTITIONER

## 2022-01-11 PROCEDURE — 80053 COMPREHEN METABOLIC PANEL: CPT | Performed by: NURSE PRACTITIONER

## 2022-01-11 PROCEDURE — 36415 COLL VENOUS BLD VENIPUNCTURE: CPT | Performed by: NURSE PRACTITIONER

## 2022-01-11 PROCEDURE — 99214 OFFICE O/P EST MOD 30 MIN: CPT | Performed by: NURSE PRACTITIONER

## 2022-01-11 RX ORDER — ATORVASTATIN CALCIUM 20 MG/1
20 TABLET, FILM COATED ORAL NIGHTLY
Qty: 90 TABLET | Refills: 1 | Status: SHIPPED | OUTPATIENT
Start: 2022-01-11 | End: 2022-03-25

## 2022-01-11 NOTE — PROGRESS NOTES
Venipuncture Blood Specimen Collection  Venipuncture performed in left arm  by Michelle Toledo with good hemostasis. Patient tolerated the procedure well without complications.   01/11/22   Michelle Toledo

## 2022-01-11 NOTE — PROGRESS NOTES
Chief Complaint  Hyperlipidemia ( lab work ) and Hypertension (lab work )    Subjective            Deanne Mariya Pittman presents to [facility]  History of Present Illness     She just had cataract surgery of both eyes per Dr. Gutierrez. She did well post-op - she has some issues with bright lights, but is otherwise doing okay. She isn't wearing her glasses any longer, except to read.     Essential hypertension with hyperlipidemia - she is known to Dr. Correa in [facility] - she was supposed to see him recently, but with COVID increasing she didn't. She was supposed to call back but she hasn't. She is supposed to have an echo, but so long as COVID is increasing she doesn't want to go anywhere near the hospital. She is taking Coreg 6.25mg BID and Cozaar 25mg daily. Lipitor is 20mg nightly. No c/o dry cough with use of lisinopril. No myalgia with use of statin.     Stage III CKD seeing nephrology - secondary to nephrosclerosis from HTN. Her BG was elevated on her labs for nephrology, but she states that she wasn't fasting for them. She is fasting today. Her creatinine was also elevated higher than her normal range - was 1.4 and normally 1.1 to 1.2. She had been forthcoming with not taking in much water. She also notes that after her telehealth appt. With Ms. Adler, she recalled that she had restarted her calcium plus D, so she did start to hold that after that October appointment to see if that was affecting her kidneys or if it was the fact that she wasn't drinking enough water.     PHQ-2 Total Score: 0      Past Medical History:   Diagnosis Date   • Cardiomyopathy (HCC)    • Hyperlipidemia    • Hypertension    • Renal cyst     right   • Stage 3 chronic kidney disease (HCC) 06/12/2020       Allergies   Allergen Reactions   • Penicillins Unknown - Low Severity        Past Surgical History:   Procedure Laterality Date   • BREAST SURGERY Left     lump removed   • COLON SURGERY     • LIVER SURGERY    "   exploritory surgery due to puncture to liver    • SUBTOTAL HYSTERECTOMY          Social History     Tobacco Use   • Smoking status: Former Smoker     Packs/day: 0.50     Quit date: 01/2019     Years since quitting: 3.0   • Smokeless tobacco: Never Used   Vaping Use   • Vaping Use: Never used   Substance Use Topics   • Alcohol use: Not on file   • Drug use: Not on file       Family History   Problem Relation Age of Onset   • Skin cancer Mother         skin carcinoma   • Breast cancer Sister    • Lupus Sister         lupus erythematosus   • Breast cancer Sister    • Rectal cancer Sister         Health Maintenance Due   Topic Date Due   • ZOSTER VACCINE (2 of 2) 10/24/2018        Current Outpatient Medications on File Prior to Visit   Medication Sig   • carvedilol (COREG) 6.25 MG tablet Take 1 tablet by mouth 2 (two) times a day.   • cyanocobalamin (VITAMIN B-12) 1000 MCG tablet Take 1,000 mcg by mouth.   • losartan (COZAAR) 25 MG tablet Take 1 tablet by mouth Daily.   • [DISCONTINUED] atorvastatin (LIPITOR) 20 MG tablet TAKE 1 TABLET DAILY EVERY NIGHT AT BEDTIME   • [DISCONTINUED] cetirizine (zyrTEC) 10 MG tablet Take 10 mg by mouth Daily.     No current facility-administered medications on file prior to visit.       Immunization History   Administered Date(s) Administered   • COVID-19 (PFIZER) 03/31/2021, 04/21/2021, 12/09/2021   • Fluzone High-Dose 65+yrs 10/23/2021   • Hepatitis A 08/14/2018, 02/26/2019   • Influenza, Unspecified 10/22/2020   • Pneumococcal Conjugate 13-Valent (PCV13) 12/08/2017   • Pneumococcal Polysaccharide (PPSV23) 12/10/2018   • Shingrix 08/29/2018   • Tdap 01/18/2019       Review of Systems     Objective     /84   Pulse 79   Temp 96 °F (35.6 °C)   Ht 158.8 cm (62.5\")   Wt 66.2 kg (146 lb)   SpO2 100%   BMI 26.28 kg/m²       Physical Exam  Vitals reviewed.   Constitutional:       General: She is not in acute distress.     Appearance: Normal appearance. She is well-developed. She " is obese.   HENT:      Head: Normocephalic and atraumatic.   Eyes:      General: No scleral icterus.     Conjunctiva/sclera: Conjunctivae normal.   Neck:      Thyroid: No thyroid mass, thyromegaly or thyroid tenderness.      Vascular: No carotid bruit.      Trachea: Trachea normal.   Cardiovascular:      Rate and Rhythm: Normal rate and regular rhythm.      Pulses: Normal pulses.      Heart sounds: No murmur heard.      Pulmonary:      Effort: Pulmonary effort is normal.      Breath sounds: Normal breath sounds. No wheezing, rhonchi or rales.   Musculoskeletal:         General: Normal range of motion.      Cervical back: Normal range of motion and neck supple.      Right lower leg: No edema.      Left lower leg: No edema.   Lymphadenopathy:      Cervical: No cervical adenopathy.   Skin:     General: Skin is warm and dry.   Neurological:      Mental Status: She is alert and oriented to person, place, and time.   Psychiatric:         Mood and Affect: Mood and affect normal.         Behavior: Behavior normal.         Thought Content: Thought content normal.         Judgment: Judgment normal.         Result Review :     The following data was reviewed by: Huyen Damon, APRN on 01/11/2022:    CMP    CMP 4/14/21 7/14/21 10/13/21   Glucose 103 (A) 113 (A) 130 (A)   BUN 20 16 22   Creatinine 1.11 (A) 1.04 (A) 1.41 (A)   eGFR Non  Am  52 (A) 36 (A)   Sodium 143 139 138   Potassium 4.8 4.9 4.5   Chloride 106 104 103   Calcium 9.1 9.2 9.0   Albumin 4.2 4.30 4.20   Total Bilirubin 0.99 1.1 0.6   Alkaline Phosphatase 106 106 93   AST (SGOT) 20 23 25   ALT (SGPT) 21 23 23   (A) Abnormal value            CBC    CBC 4/14/21 10/13/21   WBC 8.21 8.07   RBC 4.40 4.41   Hemoglobin 13.3 13.3   Hematocrit 42.2 41.8   MCV 95.9 94.8   MCH 30.2 30.2   MCHC 31.5 (A) 31.8   RDW 13.6 13.2   Platelets 326 277   (A) Abnormal value            Lipid Panel    Lipid Panel 7/14/21   Total Cholesterol 148   Triglycerides 130   HDL  Cholesterol 54   VLDL Cholesterol 23   LDL Cholesterol  71   LDL/HDL Ratio 1.26           Microalbumin    Microalbumin 10/13/21   Microalbumin, Urine <1.2             Data reviewed: Radiologic studies : DEXA/Mammogram and Consultant notes : Nephrology   Mammo Screening Bilateral With CAD (06/29/2021 10:59)  DEXA Bone Density Axial (06/29/2021 11:00)  Telemedicine note from Vivian Adler 10/28/2021  Adult Transthoracic Echo Complete W/ Cont if Necessary Per Protocol (11/25/2019 19:34)             Assessment and Plan      Diagnoses and all orders for this visit:    1. Essential (primary) hypertension (Primary)  -     CBC Auto Differential  -     Comprehensive Metabolic Panel    2. Mixed hyperlipidemia  -     atorvastatin (LIPITOR) 20 MG tablet; Take 1 tablet by mouth Every Night.  Dispense: 90 tablet; Refill: 1  -     Comprehensive Metabolic Panel  -     Lipid Panel    3. Stage 3a chronic kidney disease (HCC)  -     CBC Auto Differential  -     Comprehensive Metabolic Panel    4. Osteopenia determined by x-ray  Comments:  currently holding calcium plus vitamin D            Follow Up     Return in about 6 months (around 7/18/2022) for Medicare Wellness, Recheck.    Patient was given instructions and counseling regarding her condition or for health maintenance advice. Please see specific information pulled into the AVS if appropriate.     Deanne Meraz Zian  reports that she quit smoking about 3 years ago. She smoked 0.50 packs per day. She has never used smokeless tobacco.

## 2022-01-13 DIAGNOSIS — N18.30 CONTROLLED TYPE 2 DIABETES MELLITUS WITH STAGE 3 CHRONIC KIDNEY DISEASE, WITHOUT LONG-TERM CURRENT USE OF INSULIN: Primary | ICD-10-CM

## 2022-01-13 DIAGNOSIS — E11.22 CONTROLLED TYPE 2 DIABETES MELLITUS WITH STAGE 3 CHRONIC KIDNEY DISEASE, WITHOUT LONG-TERM CURRENT USE OF INSULIN: Primary | ICD-10-CM

## 2022-01-13 RX ORDER — BLOOD-GLUCOSE METER
1 EACH MISCELLANEOUS DAILY
Qty: 1 KIT | Refills: 0 | Status: SHIPPED | OUTPATIENT
Start: 2022-01-13

## 2022-01-13 RX ORDER — BLOOD SUGAR DIAGNOSTIC
STRIP MISCELLANEOUS
Qty: 100 EACH | Refills: 3 | Status: SHIPPED | OUTPATIENT
Start: 2022-01-13

## 2022-01-13 RX ORDER — LANCETS
EACH MISCELLANEOUS
Qty: 100 EACH | Refills: 3 | Status: SHIPPED | OUTPATIENT
Start: 2022-01-13

## 2022-01-14 ENCOUNTER — TELEPHONE (OUTPATIENT)
Dept: FAMILY MEDICINE CLINIC | Facility: CLINIC | Age: 77
End: 2022-01-14

## 2022-01-14 LAB — HBA1C MFR BLD: 6.49 % (ref 4.8–5.6)

## 2022-01-14 NOTE — TELEPHONE ENCOUNTER
Caller: Deanne Pittman    Relationship: Self    Best call back number:     What medications are you currently taking:   Current Outpatient Medications on File Prior to Visit   Medication Sig Dispense Refill   • Accu-Chek Softclix Lancets lancets Check blood glucose once daily fasting and record. 100 each 3   • atorvastatin (LIPITOR) 20 MG tablet Take 1 tablet by mouth Every Night. 90 tablet 1   • Blood Glucose Monitoring Suppl (Accu-Chek Guide) w/Device kit 1 kit Daily. Check blood glucose once daily fasting and record. 1 kit 0   • carvedilol (COREG) 6.25 MG tablet Take 1 tablet by mouth 2 (two) times a day.     • cyanocobalamin (VITAMIN B-12) 1000 MCG tablet Take 1,000 mcg by mouth.     • glucose blood (Accu-Chek Guide) test strip Check blood glucose once daily fasting and record. 100 each 3   • losartan (COZAAR) 25 MG tablet Take 1 tablet by mouth Daily.     • metFORMIN (Glucophage) 500 MG tablet Take 1 tablet by mouth 2 (Two) Times a Day With Meals. 60 tablet 0     No current facility-administered medications on file prior to visit.          When did you start taking these medications: YESTERDAY     Which medication are you concerned about: METFORMIN     Who prescribed you this medication: MAIA LOPEZ     What are your concerns: PATIENT STATES SHE HAS HAD DIARRHEA ALMOST ALL NIGHT

## 2022-01-14 NOTE — TELEPHONE ENCOUNTER
Spoke with Deanne. She is going to try one tablet with her biggest meal, which is dinner but she is nervous it will keep her up all night with diarrhea. She said if the diarrhea continues she wants to try with diet and exercise but doesn't know where to start with how much sugar she needs to consume a day. She said she already checks her BG fasting and this morning it was 115.

## 2022-01-14 NOTE — TELEPHONE ENCOUNTER
Pt says she took it as instructed - 1 pill BID. She took it at 1p when she picked it up and she said she took the 2nd around 6p. She said she has not taken any today.

## 2022-02-01 ENCOUNTER — TELEPHONE (OUTPATIENT)
Dept: FAMILY MEDICINE CLINIC | Facility: CLINIC | Age: 77
End: 2022-02-01

## 2022-02-01 DIAGNOSIS — N18.30 CONTROLLED TYPE 2 DIABETES MELLITUS WITH STAGE 3 CHRONIC KIDNEY DISEASE, WITHOUT LONG-TERM CURRENT USE OF INSULIN: ICD-10-CM

## 2022-02-01 DIAGNOSIS — E11.22 CONTROLLED TYPE 2 DIABETES MELLITUS WITH STAGE 3 CHRONIC KIDNEY DISEASE, WITHOUT LONG-TERM CURRENT USE OF INSULIN: ICD-10-CM

## 2022-02-01 NOTE — TELEPHONE ENCOUNTER
Caller: Deanne Pittman    Relationship: Self    Best call back number: [phone]     Requested Prescriptions:   Requested Prescriptions     Pending Prescriptions Disp Refills   • metFORMIN (Glucophage) 500 MG tablet 60 tablet 0     Sig: Take 1 tablet by mouth 2 (Two) Times a Day With Meals.        Pharmacy where request should be sent: [facility] - [address] [phone] [facility] [phone]      Additional details provided by patient: PATIENT IS NEEDING A REFILL. PLEASE CALL AND ADVISE.     Does the patient have less than a 3 day supply:  [x] Yes  [] No    Rohini Lisa Rep   02/01/22 08:55 EST

## 2022-03-24 DIAGNOSIS — E78.2 MIXED HYPERLIPIDEMIA: ICD-10-CM

## 2022-03-25 RX ORDER — ATORVASTATIN CALCIUM 20 MG/1
TABLET, FILM COATED ORAL
Qty: 90 TABLET | Refills: 0 | Status: SHIPPED | OUTPATIENT
Start: 2022-03-25 | End: 2022-06-23

## 2022-04-14 ENCOUNTER — OFFICE VISIT (OUTPATIENT)
Dept: FAMILY MEDICINE CLINIC | Facility: CLINIC | Age: 77
End: 2022-04-14

## 2022-04-14 VITALS
WEIGHT: 137 LBS | BODY MASS INDEX: 24.66 KG/M2 | SYSTOLIC BLOOD PRESSURE: 120 MMHG | DIASTOLIC BLOOD PRESSURE: 80 MMHG | TEMPERATURE: 96.5 F | HEART RATE: 88 BPM | OXYGEN SATURATION: 95 %

## 2022-04-14 DIAGNOSIS — Z12.31 SCREENING MAMMOGRAM, ENCOUNTER FOR: ICD-10-CM

## 2022-04-14 DIAGNOSIS — I10 ESSENTIAL (PRIMARY) HYPERTENSION: ICD-10-CM

## 2022-04-14 DIAGNOSIS — N18.31 STAGE 3A CHRONIC KIDNEY DISEASE: ICD-10-CM

## 2022-04-14 DIAGNOSIS — E11.22 CONTROLLED TYPE 2 DIABETES MELLITUS WITH STAGE 3 CHRONIC KIDNEY DISEASE, WITHOUT LONG-TERM CURRENT USE OF INSULIN: Primary | ICD-10-CM

## 2022-04-14 DIAGNOSIS — E11.9 ENCOUNTER FOR DIABETIC FOOT EXAM: ICD-10-CM

## 2022-04-14 DIAGNOSIS — N18.30 CONTROLLED TYPE 2 DIABETES MELLITUS WITH STAGE 3 CHRONIC KIDNEY DISEASE, WITHOUT LONG-TERM CURRENT USE OF INSULIN: Primary | ICD-10-CM

## 2022-04-14 DIAGNOSIS — E78.2 MIXED HYPERLIPIDEMIA: ICD-10-CM

## 2022-04-14 LAB
ALBUMIN SERPL-MCNC: 4.8 G/DL (ref 3.5–5.2)
ALBUMIN/GLOB SERPL: 2.4 G/DL
ALP SERPL-CCNC: 74 U/L (ref 39–117)
ALT SERPL W P-5'-P-CCNC: 20 U/L (ref 1–33)
ANION GAP SERPL CALCULATED.3IONS-SCNC: 10 MMOL/L (ref 5–15)
AST SERPL-CCNC: 18 U/L (ref 1–32)
BILIRUB SERPL-MCNC: 0.6 MG/DL (ref 0–1.2)
BUN SERPL-MCNC: 20 MG/DL (ref 8–23)
BUN/CREAT SERPL: 21.1 (ref 7–25)
CALCIUM SPEC-SCNC: 9.5 MG/DL (ref 8.6–10.5)
CHLORIDE SERPL-SCNC: 105 MMOL/L (ref 98–107)
CO2 SERPL-SCNC: 24 MMOL/L (ref 22–29)
CREAT SERPL-MCNC: 0.95 MG/DL (ref 0.57–1)
EGFRCR SERPLBLD CKD-EPI 2021: 62.6 ML/MIN/1.73
GLOBULIN UR ELPH-MCNC: 2 GM/DL
GLUCOSE SERPL-MCNC: 105 MG/DL (ref 65–99)
HBA1C MFR BLD: 5.7 % (ref 4.8–5.6)
POTASSIUM SERPL-SCNC: 4.9 MMOL/L (ref 3.5–5.2)
PROT SERPL-MCNC: 6.8 G/DL (ref 6–8.5)
SODIUM SERPL-SCNC: 139 MMOL/L (ref 136–145)

## 2022-04-14 PROCEDURE — 99214 OFFICE O/P EST MOD 30 MIN: CPT | Performed by: NURSE PRACTITIONER

## 2022-04-14 PROCEDURE — 80053 COMPREHEN METABOLIC PANEL: CPT | Performed by: NURSE PRACTITIONER

## 2022-04-14 PROCEDURE — 83036 HEMOGLOBIN GLYCOSYLATED A1C: CPT | Performed by: NURSE PRACTITIONER

## 2022-04-14 PROCEDURE — 36415 COLL VENOUS BLD VENIPUNCTURE: CPT | Performed by: NURSE PRACTITIONER

## 2022-04-14 NOTE — PROGRESS NOTES
Venipuncture Blood Specimen Collection  Venipuncture performed in left arm  by Michelle Toledo with good hemostasis. Patient tolerated the procedure well without complications.   04/14/22   Michelle Toledo

## 2022-04-14 NOTE — PROGRESS NOTES
Chief Complaint  Diabetes    Subjective            Deanne Mariya Pittman presents to [facility]  History of Present Illness     Patient presents to the office today to follow-up on chronic health conditions and medication refills.    Essential hypertension with dyslipidemia, currently prescribed carvedilol 6.25 mg twice daily, losartan 25 mg daily, and Lipitor 20 mg nightly.  Her cardiologist is Dr. Correa. Blood pressure is normotensive at 120/80.    She has stage III chronic kidney disease and is seeing nephrology, BETSY Pelletier.  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    At the time of her last appointment an A1c was checked and was elevated at 6.49%, consistent with type 2 diabetes mellitus. She is taking metformin 500mg BID. She initially did not tolerate BID, but not it is all lined out. This AM her BG was 105. Sometimes it is 123-127. She does really try to watch what she eats - if she gets a whopper then she will take off part of the bun. Denies frequent thirst, hunger, or urination. No neuropathy symptoms of the fingers or toes.     She is getting out and walking for activity.  She also has a balance board that she is using, as well as light weights for exercise/toning.  She has lost 9 pounds since her appointment in January.    Past Medical History:   Diagnosis Date   • Cardiomyopathy (HCC)    • Hyperlipidemia    • Hypertension    • Renal cyst     right   • Stage 3 chronic kidney disease (HCC) 06/12/2020       Allergies   Allergen Reactions   • Penicillins Unknown - Low Severity        Past Surgical History:   Procedure Laterality Date   • BREAST SURGERY Left     lump removed   • COLON SURGERY     • LIVER SURGERY      exploritory surgery due to puncture to liver    • SUBTOTAL HYSTERECTOMY          Social History     Tobacco Use   • Smoking status: Former Smoker     Packs/day: 0.50     Years: 46.00     Pack years: 23.00     Start date:  1972     Quit date: 2018     Years since quittin.2   • Smokeless tobacco: Never Used   Vaping Use   • Vaping Use: Never used       Family History   Problem Relation Age of Onset   • Skin cancer Mother         skin carcinoma   • Breast cancer Sister    • Lupus Sister         lupus erythematosus   • Breast cancer Sister    • Rectal cancer Sister         There are no preventive care reminders to display for this patient.     Current Outpatient Medications on File Prior to Visit   Medication Sig   • Accu-Chek Softclix Lancets lancets Check blood glucose once daily fasting and record.   • atorvastatin (LIPITOR) 20 MG tablet TAKE 1 TABLET DAILY EVERY NIGHT AT BEDTIME   • Blood Glucose Monitoring Suppl (Accu-Chek Guide) w/Device kit 1 kit Daily. Check blood glucose once daily fasting and record.   • carvedilol (COREG) 6.25 MG tablet Take 1 tablet by mouth 2 (two) times a day.   • cyanocobalamin (VITAMIN B-12) 1000 MCG tablet Take 1,000 mcg by mouth.   • glucose blood (Accu-Chek Guide) test strip Check blood glucose once daily fasting and record.   • losartan (COZAAR) 25 MG tablet Take 1 tablet by mouth Daily.   • metFORMIN (Glucophage) 500 MG tablet Take 1 tablet by mouth 2 (Two) Times a Day With Meals.     No current facility-administered medications on file prior to visit.       Immunization History   Administered Date(s) Administered   • COVID-19 (PFIZER) PURPLE CAP 2021, 2021, 2021   • Fluzone High-Dose 65+yrs 10/23/2021   • Hepatitis A 2018, 2019   • Influenza, Unspecified 10/22/2020   • Pneumococcal Conjugate 13-Valent (PCV13) 2017   • Pneumococcal Polysaccharide (PPSV23) 12/10/2018   • Shingrix 2018, 2019   • Tdap 2019       Review of Systems     Objective     /80   Pulse 88   Temp 96.5 °F (35.8 °C)   Wt 62.1 kg (137 lb)   SpO2 95%   BMI 24.66 kg/m²       Physical Exam  Vitals reviewed.   Constitutional:       General: She is not in acute  distress.     Appearance: Normal appearance. She is well-developed and normal weight.   HENT:      Head: Normocephalic and atraumatic.   Eyes:      General: No scleral icterus.     Extraocular Movements: Extraocular movements intact.      Conjunctiva/sclera: Conjunctivae normal.   Neck:      Vascular: No carotid bruit.      Trachea: Trachea normal.   Cardiovascular:      Rate and Rhythm: Normal rate and regular rhythm.      Pulses: Normal pulses.           Dorsalis pedis pulses are 2+ on the right side and 2+ on the left side.        Posterior tibial pulses are 2+ on the right side and 2+ on the left side.      Heart sounds: No murmur heard.  Pulmonary:      Effort: Pulmonary effort is normal.      Breath sounds: Normal breath sounds. No wheezing or rhonchi.   Musculoskeletal:         General: Normal range of motion.      Cervical back: Normal range of motion and neck supple.      Right lower leg: No edema.      Left lower leg: No edema.      Right foot: Normal range of motion. No deformity or bunion.      Left foot: Normal range of motion. No deformity or bunion.   Feet:      Right foot:      Protective Sensation: 10 sites tested. 10 sites sensed.      Skin integrity: Skin integrity normal. No ulcer, blister, callus or dry skin.      Toenail Condition: Right toenails are normal.      Left foot:      Protective Sensation: 10 sites tested. 10 sites sensed.      Skin integrity: Skin integrity normal. No ulcer, blister, callus or dry skin.      Toenail Condition: Left toenails are normal.      Comments:      Lymphadenopathy:      Cervical: No cervical adenopathy.   Skin:     General: Skin is warm and dry.   Neurological:      Mental Status: She is alert and oriented to person, place, and time.   Psychiatric:         Mood and Affect: Mood and affect normal.         Behavior: Behavior normal.         Thought Content: Thought content normal.         Judgment: Judgment normal.         Result Review :     The following data  was reviewed by: BETSY Rocha on 04/14/2022:    Common labs    Common Labsle 7/14/21 7/14/21 10/13/21 10/13/21 10/13/21 1/11/22 1/11/22 1/11/22 1/11/22    0947 0949 1028 1028 1152 1110 1110 1110 1110   Glucose  113 (A)  130 (A)   116 (A)     BUN  16  22   15     Creatinine  1.04 (A)  1.41 (A)   1.06 (A)     eGFR Non  Am  52 (A)  36 (A)   51 (A)     Sodium  139  138   141     Potassium  4.9  4.5   5.0     Chloride  104  103   104     Calcium  9.2  9.0   9.8     Albumin  4.30  4.20   4.70     Total Bilirubin  1.1  0.6   1.1     Alkaline Phosphatase  106  93   116     AST (SGOT)  23  25   17     ALT (SGPT)  23  23   19     WBC   8.07   7.54      Hemoglobin   13.3   14.2      Hematocrit   41.8   42.4      Platelets   277   322      Total Cholesterol 148       153    Triglycerides 130       118    HDL Cholesterol 54       65 (A)    LDL Cholesterol  71       67    Hemoglobin A1C         6.49 (A)   Microalbumin, Urine     <1.2       (A) Abnormal value            Hemoglobin A1c (01/11/2022 11:10)    Data reviewed: Radiologic studies :   Mammo Screening Bilateral With CAD (06/29/2021 10:59)           Assessment and Plan      Diagnoses and all orders for this visit:    1. Controlled type 2 diabetes mellitus with stage 3 chronic kidney disease, without long-term current use of insulin (HCC) (Primary)  -     Comprehensive Metabolic Panel  -     Hemoglobin A1c    2. Encounter for diabetic foot exam (HCC)    3. Essential (primary) hypertension    4. Stage 3a chronic kidney disease (HCC)    5. Mixed hyperlipidemia    6. Screening mammogram, encounter for  -     Mammo Screening Bilateral With CAD; Future            Follow Up     Return in about 3 months (around 7/14/2022) for medication refills and fasting labs.    Patient was given instructions and counseling regarding her condition or for health maintenance advice. Please see specific information pulled into the AVS if appropriate.     Deanne Pittman  reports  that she quit smoking about 4 years ago. She started smoking about 50 years ago. She has a 23.00 pack-year smoking history. She has never used smokeless tobacco.

## 2022-04-21 ENCOUNTER — TRANSCRIBE ORDERS (OUTPATIENT)
Dept: ADMINISTRATIVE | Facility: HOSPITAL | Age: 77
End: 2022-04-21

## 2022-04-21 ENCOUNTER — LAB (OUTPATIENT)
Dept: LAB | Facility: HOSPITAL | Age: 77
End: 2022-04-21

## 2022-04-21 DIAGNOSIS — N18.30 STAGE 3 CHRONIC KIDNEY DISEASE, UNSPECIFIED WHETHER STAGE 3A OR 3B CKD: ICD-10-CM

## 2022-04-21 DIAGNOSIS — R73.01 IMPAIRED FASTING GLUCOSE: ICD-10-CM

## 2022-04-21 DIAGNOSIS — I10 ESSENTIAL HYPERTENSION, MALIGNANT: ICD-10-CM

## 2022-04-21 DIAGNOSIS — I10 ESSENTIAL HYPERTENSION, MALIGNANT: Primary | ICD-10-CM

## 2022-04-21 LAB
ALBUMIN SERPL-MCNC: 4.1 G/DL (ref 3.5–5.2)
ALBUMIN/GLOB SERPL: 1.7 G/DL
ALP SERPL-CCNC: 75 U/L (ref 39–117)
ALT SERPL W P-5'-P-CCNC: 17 U/L (ref 1–33)
ANION GAP SERPL CALCULATED.3IONS-SCNC: 13 MMOL/L (ref 5–15)
AST SERPL-CCNC: 17 U/L (ref 1–32)
BACTERIA UR QL AUTO: ABNORMAL /HPF
BASOPHILS # BLD AUTO: 0.1 10*3/MM3 (ref 0–0.2)
BASOPHILS NFR BLD AUTO: 1.4 % (ref 0–1.5)
BILIRUB SERPL-MCNC: 0.8 MG/DL (ref 0–1.2)
BILIRUB UR QL STRIP: NEGATIVE
BUN SERPL-MCNC: 21 MG/DL (ref 8–23)
BUN/CREAT SERPL: 20.8 (ref 7–25)
CALCIUM SPEC-SCNC: 9.7 MG/DL (ref 8.6–10.5)
CHLORIDE SERPL-SCNC: 103 MMOL/L (ref 98–107)
CLARITY UR: ABNORMAL
CO2 SERPL-SCNC: 23 MMOL/L (ref 22–29)
COLOR UR: YELLOW
CREAT SERPL-MCNC: 1.01 MG/DL (ref 0.57–1)
CREAT UR-MCNC: 120.9 MG/DL
DEPRECATED RDW RBC AUTO: 42.8 FL (ref 37–54)
EGFRCR SERPLBLD CKD-EPI 2021: 58.2 ML/MIN/1.73
EOSINOPHIL # BLD AUTO: 0.44 10*3/MM3 (ref 0–0.4)
EOSINOPHIL NFR BLD AUTO: 6.2 % (ref 0.3–6.2)
ERYTHROCYTE [DISTWIDTH] IN BLOOD BY AUTOMATED COUNT: 12.5 % (ref 12.3–15.4)
GLOBULIN UR ELPH-MCNC: 2.4 GM/DL
GLUCOSE SERPL-MCNC: 103 MG/DL (ref 65–99)
GLUCOSE UR STRIP-MCNC: NEGATIVE MG/DL
HCT VFR BLD AUTO: 38.6 % (ref 34–46.6)
HGB BLD-MCNC: 12.7 G/DL (ref 12–15.9)
HGB UR QL STRIP.AUTO: ABNORMAL
HYALINE CASTS UR QL AUTO: ABNORMAL /LPF
IMM GRANULOCYTES # BLD AUTO: 0.04 10*3/MM3 (ref 0–0.05)
IMM GRANULOCYTES NFR BLD AUTO: 0.6 % (ref 0–0.5)
KETONES UR QL STRIP: NEGATIVE
LEUKOCYTE ESTERASE UR QL STRIP.AUTO: ABNORMAL
LYMPHOCYTES # BLD AUTO: 1.75 10*3/MM3 (ref 0.7–3.1)
LYMPHOCYTES NFR BLD AUTO: 24.5 % (ref 19.6–45.3)
MCH RBC QN AUTO: 30.8 PG (ref 26.6–33)
MCHC RBC AUTO-ENTMCNC: 32.9 G/DL (ref 31.5–35.7)
MCV RBC AUTO: 93.5 FL (ref 79–97)
MONOCYTES # BLD AUTO: 0.77 10*3/MM3 (ref 0.1–0.9)
MONOCYTES NFR BLD AUTO: 10.8 % (ref 5–12)
NEUTROPHILS NFR BLD AUTO: 4.03 10*3/MM3 (ref 1.7–7)
NEUTROPHILS NFR BLD AUTO: 56.5 % (ref 42.7–76)
NITRITE UR QL STRIP: POSITIVE
NRBC BLD AUTO-RTO: 0 /100 WBC (ref 0–0.2)
PH UR STRIP.AUTO: 6 [PH] (ref 5–8)
PLATELET # BLD AUTO: 280 10*3/MM3 (ref 140–450)
PMV BLD AUTO: 12 FL (ref 6–12)
POTASSIUM SERPL-SCNC: 4.9 MMOL/L (ref 3.5–5.2)
PROT ?TM UR-MCNC: 13.7 MG/DL
PROT SERPL-MCNC: 6.5 G/DL (ref 6–8.5)
PROT UR QL STRIP: ABNORMAL
PROT/CREAT UR: 0.11 MG/G{CREAT}
RBC # BLD AUTO: 4.13 10*6/MM3 (ref 3.77–5.28)
RBC # UR STRIP: ABNORMAL /HPF
REF LAB TEST METHOD: ABNORMAL
SODIUM SERPL-SCNC: 139 MMOL/L (ref 136–145)
SP GR UR STRIP: 1.02 (ref 1–1.03)
SQUAMOUS #/AREA URNS HPF: ABNORMAL /HPF
UROBILINOGEN UR QL STRIP: ABNORMAL
WBC # UR STRIP: ABNORMAL /HPF
WBC NRBC COR # BLD: 7.13 10*3/MM3 (ref 3.4–10.8)

## 2022-04-21 PROCEDURE — 85025 COMPLETE CBC W/AUTO DIFF WBC: CPT

## 2022-04-21 PROCEDURE — 82570 ASSAY OF URINE CREATININE: CPT

## 2022-04-21 PROCEDURE — 36415 COLL VENOUS BLD VENIPUNCTURE: CPT

## 2022-04-21 PROCEDURE — 84156 ASSAY OF PROTEIN URINE: CPT

## 2022-04-21 PROCEDURE — 81001 URINALYSIS AUTO W/SCOPE: CPT

## 2022-04-21 PROCEDURE — 80053 COMPREHEN METABOLIC PANEL: CPT

## 2022-05-04 DIAGNOSIS — E11.22 CONTROLLED TYPE 2 DIABETES MELLITUS WITH STAGE 3 CHRONIC KIDNEY DISEASE, WITHOUT LONG-TERM CURRENT USE OF INSULIN: ICD-10-CM

## 2022-05-04 DIAGNOSIS — N18.30 CONTROLLED TYPE 2 DIABETES MELLITUS WITH STAGE 3 CHRONIC KIDNEY DISEASE, WITHOUT LONG-TERM CURRENT USE OF INSULIN: ICD-10-CM

## 2022-06-23 DIAGNOSIS — E78.2 MIXED HYPERLIPIDEMIA: ICD-10-CM

## 2022-06-23 RX ORDER — ATORVASTATIN CALCIUM 20 MG/1
TABLET, FILM COATED ORAL
Qty: 90 TABLET | Refills: 0 | Status: SHIPPED | OUTPATIENT
Start: 2022-06-23 | End: 2022-07-13 | Stop reason: SDUPTHER

## 2022-07-01 ENCOUNTER — APPOINTMENT (OUTPATIENT)
Dept: MAMMOGRAPHY | Facility: HOSPITAL | Age: 77
End: 2022-07-01

## 2022-07-05 ENCOUNTER — HOSPITAL ENCOUNTER (OUTPATIENT)
Dept: MAMMOGRAPHY | Facility: HOSPITAL | Age: 77
Discharge: HOME | End: 2022-07-05
Admitting: NURSE PRACTITIONER

## 2022-07-05 DIAGNOSIS — Z12.31 SCREENING MAMMOGRAM, ENCOUNTER FOR: ICD-10-CM

## 2022-07-05 PROCEDURE — 77067 SCR MAMMO BI INCL CAD: CPT

## 2022-07-13 ENCOUNTER — OFFICE VISIT (OUTPATIENT)
Dept: FAMILY MEDICINE CLINIC | Facility: CLINIC | Age: 77
End: 2022-07-13

## 2022-07-13 VITALS
SYSTOLIC BLOOD PRESSURE: 118 MMHG | TEMPERATURE: 96.6 F | DIASTOLIC BLOOD PRESSURE: 74 MMHG | WEIGHT: 130 LBS | HEIGHT: 61 IN | OXYGEN SATURATION: 97 % | BODY MASS INDEX: 24.55 KG/M2 | HEART RATE: 79 BPM

## 2022-07-13 DIAGNOSIS — I10 ESSENTIAL HYPERTENSION, MALIGNANT: ICD-10-CM

## 2022-07-13 DIAGNOSIS — E78.2 MIXED HYPERLIPIDEMIA: ICD-10-CM

## 2022-07-13 DIAGNOSIS — N18.30 CONTROLLED TYPE 2 DIABETES MELLITUS WITH STAGE 3 CHRONIC KIDNEY DISEASE, WITHOUT LONG-TERM CURRENT USE OF INSULIN: Primary | ICD-10-CM

## 2022-07-13 DIAGNOSIS — R73.01 IMPAIRED FASTING GLUCOSE: ICD-10-CM

## 2022-07-13 DIAGNOSIS — N18.30 STAGE 3 CHRONIC KIDNEY DISEASE, UNSPECIFIED WHETHER STAGE 3A OR 3B CKD: ICD-10-CM

## 2022-07-13 DIAGNOSIS — Z87.891 FORMER SMOKER: ICD-10-CM

## 2022-07-13 DIAGNOSIS — E11.22 CONTROLLED TYPE 2 DIABETES MELLITUS WITH STAGE 3 CHRONIC KIDNEY DISEASE, WITHOUT LONG-TERM CURRENT USE OF INSULIN: Primary | ICD-10-CM

## 2022-07-13 DIAGNOSIS — R31.29 MICROSCOPIC HEMATURIA: ICD-10-CM

## 2022-07-13 DIAGNOSIS — R35.0 URINARY FREQUENCY: ICD-10-CM

## 2022-07-13 DIAGNOSIS — Z12.2 SCREENING FOR LUNG CANCER: ICD-10-CM

## 2022-07-13 LAB
ALBUMIN SERPL-MCNC: 4.5 G/DL (ref 3.5–5.2)
ALBUMIN UR-MCNC: 3.9 MG/DL
ALBUMIN/GLOB SERPL: 2.3 G/DL
ALP SERPL-CCNC: 67 U/L (ref 39–117)
ALT SERPL W P-5'-P-CCNC: 12 U/L (ref 1–33)
ANION GAP SERPL CALCULATED.3IONS-SCNC: 10.5 MMOL/L (ref 5–15)
AST SERPL-CCNC: 12 U/L (ref 1–32)
BASOPHILS # BLD AUTO: 0.07 10*3/MM3 (ref 0–0.2)
BASOPHILS NFR BLD AUTO: 1 % (ref 0–1.5)
BILIRUB BLD-MCNC: NEGATIVE MG/DL
BILIRUB SERPL-MCNC: 0.7 MG/DL (ref 0–1.2)
BUN SERPL-MCNC: 20 MG/DL (ref 8–23)
BUN/CREAT SERPL: 19.2 (ref 7–25)
CALCIUM SPEC-SCNC: 9.2 MG/DL (ref 8.6–10.5)
CHLORIDE SERPL-SCNC: 106 MMOL/L (ref 98–107)
CHOLEST SERPL-MCNC: 126 MG/DL (ref 0–200)
CLARITY, POC: CLEAR
CO2 SERPL-SCNC: 20.5 MMOL/L (ref 22–29)
COLOR UR: YELLOW
CREAT SERPL-MCNC: 1.04 MG/DL (ref 0.57–1)
CREAT UR-MCNC: 141.3 MG/DL
DEPRECATED RDW RBC AUTO: 41.2 FL (ref 37–54)
EGFRCR SERPLBLD CKD-EPI 2021: 56.2 ML/MIN/1.73
EOSINOPHIL # BLD AUTO: 0.15 10*3/MM3 (ref 0–0.4)
EOSINOPHIL NFR BLD AUTO: 2.1 % (ref 0.3–6.2)
ERYTHROCYTE [DISTWIDTH] IN BLOOD BY AUTOMATED COUNT: 12.1 % (ref 12.3–15.4)
EXPIRATION DATE: ABNORMAL
GLOBULIN UR ELPH-MCNC: 2 GM/DL
GLUCOSE SERPL-MCNC: 112 MG/DL (ref 65–99)
GLUCOSE UR STRIP-MCNC: NEGATIVE MG/DL
HBA1C MFR BLD: 5.7 % (ref 4.8–5.6)
HCT VFR BLD AUTO: 38.3 % (ref 34–46.6)
HDLC SERPL-MCNC: 52 MG/DL (ref 40–60)
HGB BLD-MCNC: 12.6 G/DL (ref 12–15.9)
IMM GRANULOCYTES # BLD AUTO: 0.02 10*3/MM3 (ref 0–0.05)
IMM GRANULOCYTES NFR BLD AUTO: 0.3 % (ref 0–0.5)
KETONES UR QL: NEGATIVE
LDLC SERPL CALC-MCNC: 57 MG/DL (ref 0–100)
LDLC/HDLC SERPL: 1.08 {RATIO}
LEUKOCYTE EST, POC: ABNORMAL
LYMPHOCYTES # BLD AUTO: 1.66 10*3/MM3 (ref 0.7–3.1)
LYMPHOCYTES NFR BLD AUTO: 23.2 % (ref 19.6–45.3)
Lab: ABNORMAL
MCH RBC QN AUTO: 30.7 PG (ref 26.6–33)
MCHC RBC AUTO-ENTMCNC: 32.9 G/DL (ref 31.5–35.7)
MCV RBC AUTO: 93.2 FL (ref 79–97)
MICROALBUMIN/CREAT UR: 27.6 MG/G
MONOCYTES # BLD AUTO: 0.63 10*3/MM3 (ref 0.1–0.9)
MONOCYTES NFR BLD AUTO: 8.8 % (ref 5–12)
NEUTROPHILS NFR BLD AUTO: 4.62 10*3/MM3 (ref 1.7–7)
NEUTROPHILS NFR BLD AUTO: 64.6 % (ref 42.7–76)
NITRITE UR-MCNC: NEGATIVE MG/ML
NRBC BLD AUTO-RTO: 0 /100 WBC (ref 0–0.2)
PH UR: 5 [PH] (ref 5–8)
PLATELET # BLD AUTO: 292 10*3/MM3 (ref 140–450)
PMV BLD AUTO: 11.9 FL (ref 6–12)
POTASSIUM SERPL-SCNC: 4.7 MMOL/L (ref 3.5–5.2)
PROT SERPL-MCNC: 6.5 G/DL (ref 6–8.5)
PROT UR STRIP-MCNC: ABNORMAL MG/DL
RBC # BLD AUTO: 4.11 10*6/MM3 (ref 3.77–5.28)
RBC # UR STRIP: ABNORMAL /UL
SODIUM SERPL-SCNC: 137 MMOL/L (ref 136–145)
SP GR UR: 1.02 (ref 1–1.03)
T4 FREE SERPL-MCNC: 1.26 NG/DL (ref 0.93–1.7)
TRIGL SERPL-MCNC: 89 MG/DL (ref 0–150)
TSH SERPL DL<=0.05 MIU/L-ACNC: 2.02 UIU/ML (ref 0.27–4.2)
UROBILINOGEN UR QL: NORMAL
VLDLC SERPL-MCNC: 17 MG/DL (ref 5–40)
WBC NRBC COR # BLD: 7.15 10*3/MM3 (ref 3.4–10.8)

## 2022-07-13 PROCEDURE — 84443 ASSAY THYROID STIM HORMONE: CPT | Performed by: NURSE PRACTITIONER

## 2022-07-13 PROCEDURE — 99214 OFFICE O/P EST MOD 30 MIN: CPT | Performed by: NURSE PRACTITIONER

## 2022-07-13 PROCEDURE — 80053 COMPREHEN METABOLIC PANEL: CPT | Performed by: NURSE PRACTITIONER

## 2022-07-13 PROCEDURE — 87088 URINE BACTERIA CULTURE: CPT | Performed by: NURSE PRACTITIONER

## 2022-07-13 PROCEDURE — 80061 LIPID PANEL: CPT | Performed by: NURSE PRACTITIONER

## 2022-07-13 PROCEDURE — 85025 COMPLETE CBC W/AUTO DIFF WBC: CPT | Performed by: NURSE PRACTITIONER

## 2022-07-13 PROCEDURE — 82570 ASSAY OF URINE CREATININE: CPT | Performed by: INTERNAL MEDICINE

## 2022-07-13 PROCEDURE — 81003 URINALYSIS AUTO W/O SCOPE: CPT | Performed by: NURSE PRACTITIONER

## 2022-07-13 PROCEDURE — 83036 HEMOGLOBIN GLYCOSYLATED A1C: CPT | Performed by: NURSE PRACTITIONER

## 2022-07-13 PROCEDURE — 87086 URINE CULTURE/COLONY COUNT: CPT | Performed by: NURSE PRACTITIONER

## 2022-07-13 PROCEDURE — 36415 COLL VENOUS BLD VENIPUNCTURE: CPT | Performed by: NURSE PRACTITIONER

## 2022-07-13 PROCEDURE — 82043 UR ALBUMIN QUANTITATIVE: CPT | Performed by: INTERNAL MEDICINE

## 2022-07-13 PROCEDURE — 84439 ASSAY OF FREE THYROXINE: CPT | Performed by: NURSE PRACTITIONER

## 2022-07-13 PROCEDURE — 87186 SC STD MICRODIL/AGAR DIL: CPT | Performed by: NURSE PRACTITIONER

## 2022-07-13 RX ORDER — NITROFURANTOIN 25; 75 MG/1; MG/1
100 CAPSULE ORAL 2 TIMES DAILY
Qty: 10 CAPSULE | Refills: 0 | Status: SHIPPED | OUTPATIENT
Start: 2022-07-13 | End: 2022-07-22

## 2022-07-13 RX ORDER — ATORVASTATIN CALCIUM 20 MG/1
20 TABLET, FILM COATED ORAL NIGHTLY
Qty: 90 TABLET | Refills: 1 | Status: SHIPPED | OUTPATIENT
Start: 2022-07-13 | End: 2022-09-30 | Stop reason: SDUPTHER

## 2022-07-13 NOTE — PROGRESS NOTES
Chief Complaint  Diabetes and Hyperlipidemia    Subjective            Deanne Mariya Pittman presents to [facility]  History of Present Illness     She is here today for chronic health conditions and medication refills - she is fasting for her labs.     Essential hypertension with dyslipidemia, currently prescribed carvedilol 6.25 mg twice daily, losartan 25 mg daily, and Lipitor 20 mg nightly.  Her cardiologist is Dr. Correa. Blood pressure is normotensive at 118/74.  Currently denies any chest pain, palpitations, headaches, dizziness, or shortness of breath.  Denies any lower extremity edema.     She has stage III chronic kidney disease and is seeing nephrology, BETSY Pelletier.  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 Currently stable.      She has recently been diagnosed with type II DM - she is taking 500mg metformin BID. Her last A1c was 5.7%. Her average BG is 112 fasting. This AM is was 111. Denies frequent thirst and hunger, but she has been having frequent urination. She thinks she could have an UTI. No neuropathy symptoms of the fingers or toes.       Past Medical History:   Diagnosis Date   • Cardiomyopathy (HCC)    • Hyperlipidemia    • Hypertension    • Renal cyst     right   • Stage 3 chronic kidney disease (HCC) 2020       Allergies   Allergen Reactions   • Penicillins Unknown - Low Severity        Past Surgical History:   Procedure Laterality Date   • BREAST SURGERY Left     lump removed   • COLON SURGERY     • LIVER SURGERY      exploritory surgery due to puncture to liver    • SUBTOTAL HYSTERECTOMY          Social History     Tobacco Use   • Smoking status: Former Smoker     Packs/day: 0.50     Years: 45.00     Pack years: 22.50     Start date:      Quit date: 2018     Years since quittin.5   • Smokeless tobacco: Never Used   Vaping Use   • Vaping Use: Never used       Family History   Problem Relation Age of  "Onset   • Skin cancer Mother         skin carcinoma   • Breast cancer Sister    • Lupus Sister         lupus erythematosus   • Breast cancer Sister    • Rectal cancer Sister         Health Maintenance Due   Topic Date Due   • LUNG CANCER SCREENING  Never done   • COVID-19 Vaccine (4 - Booster for Pfizer series) 04/09/2022        Current Outpatient Medications on File Prior to Visit   Medication Sig   • Accu-Chek Softclix Lancets lancets Check blood glucose once daily fasting and record.   • Blood Glucose Monitoring Suppl (Accu-Chek Guide) w/Device kit 1 kit Daily. Check blood glucose once daily fasting and record.   • carvedilol (COREG) 6.25 MG tablet Take 1 tablet by mouth 2 (two) times a day.   • cyanocobalamin (VITAMIN B-12) 1000 MCG tablet Take 1,000 mcg by mouth.   • glucose blood (Accu-Chek Guide) test strip Check blood glucose once daily fasting and record.   • losartan (COZAAR) 25 MG tablet Take 1 tablet by mouth Daily.   • [DISCONTINUED] atorvastatin (LIPITOR) 20 MG tablet TAKE 1 TABLET DAILY EVERY NIGHT AT BEDTIME   • [DISCONTINUED] metFORMIN (GLUCOPHAGE) 500 MG tablet TAKE 1 TABLET TWICE A DAY WITH MEALS     No current facility-administered medications on file prior to visit.       Immunization History   Administered Date(s) Administered   • COVID-19 (PFIZER) PURPLE CAP 03/31/2021, 04/21/2021, 12/09/2021   • Fluzone High-Dose 65+yrs 10/23/2021   • Hepatitis A 08/14/2018, 02/26/2019   • Influenza, Unspecified 10/22/2020   • Pneumococcal Conjugate 13-Valent (PCV13) 12/08/2017   • Pneumococcal Polysaccharide (PPSV23) 12/10/2018   • Shingrix 08/29/2018, 08/19/2019   • Tdap 01/18/2019       Review of Systems     Objective     /74   Pulse 79   Temp 96.6 °F (35.9 °C)   Ht 154.9 cm (61\")   Wt 59 kg (130 lb)   SpO2 97%   BMI 24.56 kg/m²       Physical Exam  Vitals reviewed.   Constitutional:       General: She is not in acute distress.     Appearance: Normal appearance. She is well-developed and normal " weight.   HENT:      Head: Normocephalic and atraumatic.   Eyes:      General: No scleral icterus.     Extraocular Movements: Extraocular movements intact.      Conjunctiva/sclera: Conjunctivae normal.   Neck:      Thyroid: No thyroid mass, thyromegaly or thyroid tenderness.      Vascular: No carotid bruit.      Trachea: Trachea normal.   Cardiovascular:      Rate and Rhythm: Normal rate and regular rhythm.      Pulses: Normal pulses.      Heart sounds: No murmur heard.  Pulmonary:      Effort: Pulmonary effort is normal. No respiratory distress.      Breath sounds: Normal breath sounds. No wheezing, rhonchi or rales.   Abdominal:      General: Bowel sounds are normal. There is no distension.      Palpations: Abdomen is soft. There is no mass.      Tenderness: There is no abdominal tenderness. There is no right CVA tenderness or left CVA tenderness.   Musculoskeletal:         General: Normal range of motion.      Cervical back: Normal range of motion and neck supple.      Right lower leg: No edema.      Left lower leg: No edema.   Lymphadenopathy:      Cervical: No cervical adenopathy.   Skin:     General: Skin is warm and dry.   Neurological:      Mental Status: She is alert and oriented to person, place, and time.   Psychiatric:         Mood and Affect: Mood and affect normal.         Behavior: Behavior normal.         Thought Content: Thought content normal.         Judgment: Judgment normal.         Result Review :     The following data was reviewed by: BETSY Rocha on 07/13/2022:    CMP    CMP 1/11/22 4/14/22 4/21/22   Glucose 116 (A) 105 (A) 103 (A)   BUN 15 20 21   Creatinine 1.06 (A) 0.95 1.01 (A)   eGFR Non African Am 51 (A)     Sodium 141 139 139   Potassium 5.0 4.9 4.9   Chloride 104 105 103   Calcium 9.8 9.5 9.7   Albumin 4.70 4.80 4.10   Total Bilirubin 1.1 0.6 0.8   Alkaline Phosphatase 116 74 75   AST (SGOT) 17 18 17   ALT (SGPT) 19 20 17   (A) Abnormal value            CBC    CBC  10/13/21 1/11/22 4/21/22   WBC 8.07 7.54 7.13   RBC 4.41 4.59 4.13   Hemoglobin 13.3 14.2 12.7   Hematocrit 41.8 42.4 38.6   MCV 94.8 92.4 93.5   MCH 30.2 30.9 30.8   MCHC 31.8 33.5 32.9   RDW 13.2 12.4 12.5   Platelets 277 322 280           Lipid Panel    Lipid Panel 1/11/22   Total Cholesterol 153   Triglycerides 118   HDL Cholesterol 65 (A)   VLDL Cholesterol 21   LDL Cholesterol  67   LDL/HDL Ratio 0.99   (A) Abnormal value            A1C Last 3 Results    HGBA1C Last 3 Results 1/11/22 4/14/22   Hemoglobin A1C 6.49 (A) 5.70 (A)   (A) Abnormal value            Microalbumin    Microalbumin 10/13/21   Microalbumin, Urine <1.2           POCT urinalysis dipstick, automated (07/13/2022 09:19)    Data reviewed: Radiologic studies :   DEXA Bone Density Axial (06/29/2021 11:00)  Mammo Screening Bilateral With CAD (07/05/2022 10:51)           Assessment and Plan      Diagnoses and all orders for this visit:    1. Controlled type 2 diabetes mellitus with stage 3 chronic kidney disease, without long-term current use of insulin (HCC) (Primary)  -     metFORMIN (GLUCOPHAGE) 500 MG tablet; Take 1 tablet by mouth 2 (Two) Times a Day With Meals.  Dispense: 180 tablet; Refill: 1  -     CBC Auto Differential  -     Comprehensive Metabolic Panel  -     Hemoglobin A1c  -     Lipid Panel  -     TSH+Free T4    2. Mixed hyperlipidemia  -     atorvastatin (LIPITOR) 20 MG tablet; Take 1 tablet by mouth Every Night.  Dispense: 90 tablet; Refill: 1  -     Comprehensive Metabolic Panel  -     Lipid Panel    3. Former smoker  -      CT Chest Low Dose Cancer Screening WO; Future    4. Screening for lung cancer  -      CT Chest Low Dose Cancer Screening WO; Future    5. Urinary frequency  -     POCT urinalysis dipstick, automated  -     Urine Culture - Urine, Urine, Clean Catch  -     nitrofurantoin, macrocrystal-monohydrate, (Macrobid) 100 MG capsule; Take 1 capsule by mouth 2 (Two) Times a Day.  Dispense: 10 capsule; Refill: 0    6.  Microscopic hematuria  -     Urine Culture - Urine, Urine, Clean Catch  -     nitrofurantoin, macrocrystal-monohydrate, (Macrobid) 100 MG capsule; Take 1 capsule by mouth 2 (Two) Times a Day.  Dispense: 10 capsule; Refill: 0            Follow Up     Return for Medicare Wellness.    Patient was given instructions and counseling regarding her condition or for health maintenance advice. Please see specific information pulled into the AVS if appropriate.     Deanne Meraz Zain  reports that she quit smoking about 4 years ago. She started smoking about 49 years ago. She has a 22.50 pack-year smoking history. She has never used smokeless tobacco.

## 2022-07-13 NOTE — PROGRESS NOTES
..  Venipuncture Blood Specimen Collection  Venipuncture performed in lt arm  by Michelle Leonard with good hemostasis. Patient tolerated the procedure well without complications.   07/13/22   Michelle Leonard

## 2022-07-13 NOTE — PROGRESS NOTES
Venipuncture Blood Specimen Collection  Venipuncture performed in left arm  by Michelle Toledo with good hemostasis. Patient tolerated the procedure well without complications.   07/13/22   Michelle Toledo

## 2022-07-15 LAB — BACTERIA SPEC AEROBE CULT: ABNORMAL

## 2022-07-20 ENCOUNTER — HOSPITAL ENCOUNTER (OUTPATIENT)
Dept: CT IMAGING | Facility: HOSPITAL | Age: 77
Discharge: HOME | End: 2022-07-20
Admitting: NURSE PRACTITIONER

## 2022-07-20 DIAGNOSIS — Z12.2 SCREENING FOR LUNG CANCER: ICD-10-CM

## 2022-07-20 DIAGNOSIS — Z87.891 FORMER SMOKER: ICD-10-CM

## 2022-07-20 PROCEDURE — 71271 CT THORAX LUNG CANCER SCR C-: CPT

## 2022-07-22 ENCOUNTER — OFFICE VISIT (OUTPATIENT)
Dept: FAMILY MEDICINE CLINIC | Facility: CLINIC | Age: 77
End: 2022-07-22

## 2022-07-22 VITALS
HEART RATE: 66 BPM | HEIGHT: 61 IN | DIASTOLIC BLOOD PRESSURE: 80 MMHG | OXYGEN SATURATION: 98 % | SYSTOLIC BLOOD PRESSURE: 120 MMHG | BODY MASS INDEX: 24.17 KG/M2 | WEIGHT: 128 LBS | TEMPERATURE: 96.4 F

## 2022-07-22 DIAGNOSIS — Z00.00 MEDICARE ANNUAL WELLNESS VISIT, SUBSEQUENT: Primary | ICD-10-CM

## 2022-07-22 DIAGNOSIS — Z87.440 RECENT URINARY TRACT INFECTION: ICD-10-CM

## 2022-07-22 DIAGNOSIS — R82.998 OTHER ABNORMAL FINDINGS IN URINE: ICD-10-CM

## 2022-07-22 DIAGNOSIS — Z71.85 VACCINE COUNSELING: ICD-10-CM

## 2022-07-22 LAB
BACTERIA UR QL AUTO: ABNORMAL /HPF
BILIRUB BLD-MCNC: NEGATIVE MG/DL
BILIRUB UR QL STRIP: NEGATIVE
CLARITY UR: CLEAR
CLARITY, POC: CLEAR
COLOR UR: YELLOW
COLOR UR: YELLOW
EXPIRATION DATE: ABNORMAL
GLUCOSE UR STRIP-MCNC: NEGATIVE MG/DL
GLUCOSE UR STRIP-MCNC: NEGATIVE MG/DL
HGB UR QL STRIP.AUTO: NEGATIVE
HYALINE CASTS UR QL AUTO: ABNORMAL /LPF
KETONES UR QL STRIP: NEGATIVE
KETONES UR QL: NEGATIVE
LEUKOCYTE EST, POC: ABNORMAL
LEUKOCYTE ESTERASE UR QL STRIP.AUTO: ABNORMAL
Lab: ABNORMAL
NITRITE UR QL STRIP: NEGATIVE
NITRITE UR-MCNC: NEGATIVE MG/ML
PH UR STRIP.AUTO: 5.5 [PH] (ref 5–8)
PH UR: 5 [PH] (ref 5–8)
PROT UR QL STRIP: NEGATIVE
PROT UR STRIP-MCNC: NEGATIVE MG/DL
RBC # UR STRIP: ABNORMAL /HPF
RBC # UR STRIP: ABNORMAL /UL
REF LAB TEST METHOD: ABNORMAL
SP GR UR STRIP: 1.01 (ref 1–1.03)
SP GR UR: 1.01 (ref 1–1.03)
SQUAMOUS #/AREA URNS HPF: ABNORMAL /HPF
UROBILINOGEN UR QL STRIP: ABNORMAL
UROBILINOGEN UR QL: NORMAL
WBC # UR STRIP: ABNORMAL /HPF

## 2022-07-22 PROCEDURE — 96160 PT-FOCUSED HLTH RISK ASSMT: CPT | Performed by: NURSE PRACTITIONER

## 2022-07-22 PROCEDURE — 81003 URINALYSIS AUTO W/O SCOPE: CPT | Performed by: NURSE PRACTITIONER

## 2022-07-22 PROCEDURE — 1126F AMNT PAIN NOTED NONE PRSNT: CPT | Performed by: NURSE PRACTITIONER

## 2022-07-22 PROCEDURE — 1160F RVW MEDS BY RX/DR IN RCRD: CPT | Performed by: NURSE PRACTITIONER

## 2022-07-22 PROCEDURE — 81001 URINALYSIS AUTO W/SCOPE: CPT | Performed by: NURSE PRACTITIONER

## 2022-07-22 PROCEDURE — 1170F FXNL STATUS ASSESSED: CPT | Performed by: NURSE PRACTITIONER

## 2022-07-22 PROCEDURE — G0439 PPPS, SUBSEQ VISIT: HCPCS | Performed by: NURSE PRACTITIONER

## 2022-07-22 PROCEDURE — 87086 URINE CULTURE/COLONY COUNT: CPT | Performed by: NURSE PRACTITIONER

## 2022-07-22 NOTE — PROGRESS NOTES
The ABCs of the Annual Wellness Visit  Subsequent Medicare Wellness Visit    Chief Complaint   Patient presents with   • Medicare Wellness-subsequent      Subjective    History of Present Illness:  Deanne Pittman is a 76 y.o. female who presents for a Subsequent Medicare Wellness Visit.    The following portions of the patient's history were reviewed and updated as appropriate: allergies, current medications, past family history, past medical history, past social history, past surgical history and problem list.    Compared to one year ago, the patient feels her physical health is the same.    Compared to one year ago, the patient feels her mental health is the same.    Recent Hospitalizations:  She was not admitted to the hospital during the last year.       Current Medical Providers:  Patient Care Team:  Huyen Damon APRN as PCP - General (Nurse Practitioner)  Vinod Correa MD as Consulting Physician (Cardiology)  Areli Davis MD as Consulting Physician (Nephrology)  Sony Gutierrez MD as Consulting Physician (Ophthalmology)    Outpatient Medications Prior to Visit   Medication Sig Dispense Refill   • Accu-Chek Softclix Lancets lancets Check blood glucose once daily fasting and record. 100 each 3   • atorvastatin (LIPITOR) 20 MG tablet Take 1 tablet by mouth Every Night. 90 tablet 1   • Blood Glucose Monitoring Suppl (Accu-Chek Guide) w/Device kit 1 kit Daily. Check blood glucose once daily fasting and record. 1 kit 0   • carvedilol (COREG) 6.25 MG tablet Take 1 tablet by mouth 2 (two) times a day.     • cyanocobalamin (VITAMIN B-12) 1000 MCG tablet Take 1,000 mcg by mouth.     • glucose blood (Accu-Chek Guide) test strip Check blood glucose once daily fasting and record. 100 each 3   • losartan (COZAAR) 25 MG tablet Take 1 tablet by mouth Daily.     • metFORMIN (GLUCOPHAGE) 500 MG tablet Take 1 tablet by mouth 2 (Two) Times a Day With Meals. 180 tablet 1   • nitrofurantoin,  "macrocrystal-monohydrate, (Macrobid) 100 MG capsule Take 1 capsule by mouth 2 (Two) Times a Day. 10 capsule 0     No facility-administered medications prior to visit.       No opioid medication identified on active medication list. I have reviewed chart for other potential  high risk medication/s and harmful drug interactions in the elderly.          Aspirin is not on active medication list.  Aspirin use is not indicated based on review of current medical condition/s. Risk of harm outweighs potential benefits.      Patient Active Problem List   Diagnosis   • Cardiomyopathy (HCC)   • Chronic venous insufficiency   • Hyperlipidemia   • Essential (primary) hypertension   • Stage III chronic kidney disease (HCC)     Advance Care Planning  Advance Directive is on file.  ACP discussion was held with the patient during this visit. Patient has an advance directive in EMR which is still valid.           Objective    Vitals:    07/22/22 1102   BP: 120/80   Pulse: 66   Temp: 96.4 °F (35.8 °C)   SpO2: 98%   Weight: 58.1 kg (128 lb)   Height: 154.9 cm (61\")   PainSc: 0-No pain     Estimated body mass index is 24.19 kg/m² as calculated from the following:    Height as of this encounter: 154.9 cm (61\").    Weight as of this encounter: 58.1 kg (128 lb).    BMI is within normal parameters. No other follow-up for BMI required.      Does the patient have evidence of cognitive impairment? No    Physical Exam  Vitals reviewed.   Constitutional:       General: She is not in acute distress.     Appearance: Normal appearance. She is well-developed and normal weight.   HENT:      Head: Normocephalic and atraumatic.   Eyes:      General: No scleral icterus.     Extraocular Movements: Extraocular movements intact.      Conjunctiva/sclera: Conjunctivae normal.   Cardiovascular:      Rate and Rhythm: Normal rate and regular rhythm.      Pulses: Normal pulses.      Heart sounds: No murmur heard.  Pulmonary:      Effort: Pulmonary effort is " normal. No respiratory distress.      Breath sounds: Normal breath sounds. No wheezing, rhonchi or rales.   Musculoskeletal:         General: Normal range of motion.      Right lower leg: No edema.      Left lower leg: No edema.   Skin:     General: Skin is warm and dry.   Neurological:      Mental Status: She is alert and oriented to person, place, and time.   Psychiatric:         Mood and Affect: Mood and affect normal.         Behavior: Behavior normal.         Thought Content: Thought content normal.         Judgment: Judgment normal.           Common labs    Common Labsle 4/14/22 4/14/22 4/21/22 4/21/22 7/13/22 7/13/22 7/13/22 7/13/22 7/13/22    1146 1146 1016 1016 0911 0911 0911 0911 1020   Glucose  105 (A)  103 (A)    112 (A)    BUN  20  21    20    Creatinine  0.95  1.01 (A)    1.04 (A)    Sodium  139  139    137    Potassium  4.9  4.9    4.7    Chloride  105  103    106    Calcium  9.5  9.7    9.2    Albumin  4.80  4.10    4.50    Total Bilirubin  0.6  0.8    0.7    Alkaline Phosphatase  74  75    67    AST (SGOT)  18  17    12    ALT (SGPT)  20  17    12    WBC   7.13  7.15       Hemoglobin   12.7  12.6       Hematocrit   38.6  38.3       Platelets   280  292       Total Cholesterol       126     Triglycerides       89     HDL Cholesterol       52     LDL Cholesterol        57     Hemoglobin A1C 5.70 (A)     5.70 (A)      Microalbumin, Urine         3.9   (A) Abnormal value            POCT urinalysis dipstick, automated (07/22/2022 11:50)    DEXA Bone Density Axial (06/29/2021 11:00)  Mammo Screening Bilateral With CAD (07/05/2022 10:51)  CT Chest Low Dose Cancer Screening WO (07/20/2022 10:04)  Adult Transthoracic Echo Complete W/ Cont if Necessary Per Protocol (11/25/2019 19:34)         HEALTH RISK ASSESSMENT    Smoking Status:  Social History     Tobacco Use   Smoking Status Former Smoker   • Packs/day: 0.50   • Years: 45.00   • Pack years: 22.50   • Start date: 1973   • Quit date: 1/9/2018   • Years  since quittin.5   Smokeless Tobacco Never Used     Alcohol Consumption:  Social History     Substance and Sexual Activity   Alcohol Use None     Fall Risk Screen:    RAMÓNADI Fall Risk Assessment was completed, and patient is at LOW risk for falls.Assessment completed on:2022    Depression Screening:  PHQ-2/PHQ-9 Depression Screening 2022   Retired PHQ-9 Total Score -   Retired Total Score -   Little Interest or Pleasure in Doing Things 0-->not at all   Feeling Down, Depressed or Hopeless 0-->not at all   PHQ-9: Brief Depression Severity Measure Score 0       Health Habits and Functional and Cognitive Screening:  Functional & Cognitive Status 2022   Do you have difficulty preparing food and eating? No   Do you have difficulty bathing yourself, getting dressed or grooming yourself? No   Do you have difficulty using the toilet? No   Do you have difficulty moving around from place to place? No   Do you have trouble with steps or getting out of a bed or a chair? No   Current Diet Well Balanced Diet   Dental Exam Up to date   Eye Exam Up to date   Exercise (times per week) 7 times per week   Current Exercises Include Other   Do you need help using the phone?  No   Are you deaf or do you have serious difficulty hearing?  No   Do you need help with transportation? No   Do you need help shopping? No   Do you need help preparing meals?  No   Do you need help with housework?  No   Do you need help with laundry? No   Do you need help taking your medications? No   Do you need help managing money? No   Do you ever drive or ride in a car without wearing a seat belt? No   Have you felt unusual stress, anger or loneliness in the last month? No   Who do you live with? Alone   If you need help, do you have trouble finding someone available to you? No   Have you been bothered in the last four weeks by sexual problems? No   Do you have difficulty concentrating, remembering or making decisions? No       Age-appropriate  Screening Schedule:  Refer to the list below for future screening recommendations based on patient's age, sex and/or medical conditions. Orders for these recommended tests are listed in the plan section. The patient has been provided with a written plan.    Health Maintenance   Topic Date Due   • INFLUENZA VACCINE  10/01/2022   • HEMOGLOBIN A1C  01/13/2023   • DIABETIC EYE EXAM  02/17/2023   • DIABETIC FOOT EXAM  04/14/2023   • DXA SCAN  06/29/2023   • LIPID PANEL  07/13/2023   • URINE MICROALBUMIN  07/13/2023   • TDAP/TD VACCINES (2 - Td or Tdap) 01/18/2029   • ZOSTER VACCINE  Completed              Assessment & Plan      CMS Preventative Services Quick Reference    Risk Factors Identified During Encounter    Cardiovascular Disease  Immunizations Discussed/Encouraged (specific Immunizations; COVID-19)    The above risks/problems have been discussed with the patient.  Follow up actions/plans if indicated are seen below in the Assessment/Plan Section.  Pertinent information has been shared with the patient in the After Visit Summary.    Diagnoses and all orders for this visit:    1. Medicare annual wellness visit, subsequent (Primary)    2. Vaccine counseling  Comments:  Recommendations: COVID-19 booster    3. Recent urinary tract infection  -     POCT urinalysis dipstick, automated  -     Urinalysis With Microscopic - Urine, Clean Catch  -     Urine Culture - Urine, Urine, Clean Catch    4. Other abnormal findings in urine   -     Urine Culture - Urine, Urine, Clean Catch        Follow Up:   Return in about 25 weeks (around 1/13/2023) for medication refills and fasting labs.     An After Visit Summary and PPPS were made available to the patient.

## 2022-07-24 LAB — BACTERIA SPEC AEROBE CULT: NORMAL

## 2022-09-01 ENCOUNTER — OFFICE VISIT (OUTPATIENT)
Dept: FAMILY MEDICINE CLINIC | Facility: CLINIC | Age: 77
End: 2022-09-01

## 2022-09-01 VITALS
DIASTOLIC BLOOD PRESSURE: 70 MMHG | BODY MASS INDEX: 24.3 KG/M2 | OXYGEN SATURATION: 98 % | HEART RATE: 87 BPM | TEMPERATURE: 96.6 F | WEIGHT: 128.6 LBS | SYSTOLIC BLOOD PRESSURE: 120 MMHG

## 2022-09-01 DIAGNOSIS — R82.998 LEUKOCYTES IN URINE: ICD-10-CM

## 2022-09-01 DIAGNOSIS — R39.15 URGENCY OF URINATION: Primary | ICD-10-CM

## 2022-09-01 LAB
BILIRUB BLD-MCNC: NEGATIVE MG/DL
CLARITY, POC: CLEAR
COLOR UR: YELLOW
EXPIRATION DATE: ABNORMAL
GLUCOSE UR STRIP-MCNC: NEGATIVE MG/DL
KETONES UR QL: NEGATIVE
LEUKOCYTE EST, POC: ABNORMAL
Lab: ABNORMAL
NITRITE UR-MCNC: NEGATIVE MG/ML
PH UR: 5.5 [PH] (ref 5–8)
PROT UR STRIP-MCNC: ABNORMAL MG/DL
RBC # UR STRIP: ABNORMAL /UL
SP GR UR: 1.01 (ref 1–1.03)
UROBILINOGEN UR QL: NORMAL

## 2022-09-01 PROCEDURE — 87086 URINE CULTURE/COLONY COUNT: CPT | Performed by: NURSE PRACTITIONER

## 2022-09-01 PROCEDURE — 99213 OFFICE O/P EST LOW 20 MIN: CPT | Performed by: NURSE PRACTITIONER

## 2022-09-01 PROCEDURE — 87088 URINE BACTERIA CULTURE: CPT | Performed by: NURSE PRACTITIONER

## 2022-09-01 PROCEDURE — 87186 SC STD MICRODIL/AGAR DIL: CPT | Performed by: NURSE PRACTITIONER

## 2022-09-01 PROCEDURE — 81003 URINALYSIS AUTO W/O SCOPE: CPT | Performed by: NURSE PRACTITIONER

## 2022-09-01 RX ORDER — CEPHALEXIN 500 MG/1
500 CAPSULE ORAL 2 TIMES DAILY
Qty: 10 CAPSULE | Refills: 0 | Status: SHIPPED | OUTPATIENT
Start: 2022-09-01 | End: 2023-01-16

## 2022-09-01 NOTE — PROGRESS NOTES
Chief Complaint  Urinary Urgency    PHQ-2 Total Score: 1    Subjective        Past Medical History:   Diagnosis Date   • Cardiomyopathy (HCC)    • Hyperlipidemia    • Hypertension    • Renal cyst     right   • Stage 3 chronic kidney disease (HCC) 2020     Social History     Tobacco Use   • Smoking status: Former Smoker     Packs/day: 0.50     Years: 45.00     Pack years: 22.50     Start date:      Quit date: 2018     Years since quittin.6   • Smokeless tobacco: Never Used   Vaping Use   • Vaping Use: Never used      Current Outpatient Medications on File Prior to Visit   Medication Sig   • Accu-Chek Softclix Lancets lancets Check blood glucose once daily fasting and record.   • atorvastatin (LIPITOR) 20 MG tablet Take 1 tablet by mouth Every Night.   • Blood Glucose Monitoring Suppl (Accu-Chek Guide) w/Device kit 1 kit Daily. Check blood glucose once daily fasting and record.   • carvedilol (COREG) 6.25 MG tablet Take 1 tablet by mouth 2 (two) times a day.   • cyanocobalamin (VITAMIN B-12) 1000 MCG tablet Take 1,000 mcg by mouth.   • glucose blood (Accu-Chek Guide) test strip Check blood glucose once daily fasting and record.   • losartan (COZAAR) 25 MG tablet Take 1 tablet by mouth Daily.   • metFORMIN (GLUCOPHAGE) 500 MG tablet Take 1 tablet by mouth 2 (Two) Times a Day With Meals.     No current facility-administered medications on file prior to visit.      Allergies   Allergen Reactions   • Penicillins Unknown - Low Severity      Health Maintenance Due   Topic Date Due   • COVID-19 Vaccine (4 - Booster for Pfizer series) 2022      Deanne Pittman presents to [facility]  Here with concerns for UTI. Pt states she has having increased urinary frequency and urgency. Denies fever or chills or back pain. Notes urine feels hot when it comes out.       Objective   Vital Signs:   /70   Pulse 87   Temp 96.6 °F (35.9 °C)   Wt 58.3 kg (128 lb 9.6 oz)   SpO2  98%   BMI 24.30 kg/m²     Review of Systems   Cardiovascular: Negative for chest pain and palpitations.   Genitourinary: Positive for frequency. Negative for dysuria, pelvic pain, pelvic pressure and urinary incontinence.      Physical Exam  Vitals reviewed.   Constitutional:       General: She is not in acute distress.     Appearance: Normal appearance. She is well-developed.   Eyes:      Conjunctiva/sclera: Conjunctivae normal.      Pupils: Pupils are equal, round, and reactive to light.   Cardiovascular:      Rate and Rhythm: Normal rate and regular rhythm.      Heart sounds: Normal heart sounds.   Pulmonary:      Effort: Pulmonary effort is normal.      Breath sounds: Normal breath sounds.   Abdominal:      Tenderness: There is no abdominal tenderness. There is no right CVA tenderness or left CVA tenderness.   Musculoskeletal:      Cervical back: Neck supple.   Skin:     General: Skin is warm and dry.   Neurological:      Mental Status: She is alert and oriented to person, place, and time.   Psychiatric:         Mood and Affect: Mood and affect normal.         Behavior: Behavior normal.         Thought Content: Thought content normal.         Judgment: Judgment normal.        Result Review :   The following data was reviewed by: BETSY Leggett on 09/01/2022:  UA    Urinalysis 7/13/22 7/22/22 7/22/22 7/22/22 9/1/22     1150 1649 1649    Squamous Epithelial Cells, UA    3-6 (A)    Specific Gravity, UA   1.015     Ketones, UA Negative Negative Negative  Negative   Blood, UA   Negative     Leukocytes, UA Small (1+) (A) Small (1+) (A) Moderate (2+) (A)  Large (3+) (A)   Nitrite, UA   Negative     RBC, UA    0-2    WBC, UA    13-20 (A)    Bacteria, UA    None Seen    (A) Abnormal value                      Assessment and Plan    Diagnoses and all orders for this visit:    1. Urgency of urination (Primary)  -     POCT urinalysis dipstick, automated    2. Leukocytes in urine  -     Urine Culture - Urine, Urine,  Random Void  -     cephalexin (Keflex) 500 MG capsule; Take 1 capsule by mouth 2 (Two) Times a Day.  Dispense: 10 capsule; Refill: 0    Will consider referral to urology if patient continues to have recurrent UTI symptoms.    Follow Up   Return if symptoms worsen or fail to improve.  Patient was given instructions and counseling regarding her condition or for health maintenance advice. Please see specific information pulled into the AVS if appropriate.

## 2022-09-03 LAB — BACTERIA SPEC AEROBE CULT: ABNORMAL

## 2022-09-28 DIAGNOSIS — E78.2 MIXED HYPERLIPIDEMIA: ICD-10-CM

## 2022-09-28 RX ORDER — ATORVASTATIN CALCIUM 20 MG/1
TABLET, FILM COATED ORAL
Qty: 90 TABLET | Refills: 3 | OUTPATIENT
Start: 2022-09-28

## 2022-09-30 DIAGNOSIS — E78.2 MIXED HYPERLIPIDEMIA: ICD-10-CM

## 2022-09-30 RX ORDER — ATORVASTATIN CALCIUM 20 MG/1
20 TABLET, FILM COATED ORAL NIGHTLY
Qty: 90 TABLET | Refills: 0 | Status: SHIPPED | OUTPATIENT
Start: 2022-09-30 | End: 2022-12-13

## 2022-10-20 ENCOUNTER — TRANSCRIBE ORDERS (OUTPATIENT)
Dept: ADMINISTRATIVE | Facility: HOSPITAL | Age: 77
End: 2022-10-20

## 2022-10-20 ENCOUNTER — LAB (OUTPATIENT)
Dept: LAB | Facility: HOSPITAL | Age: 77
End: 2022-10-20

## 2022-10-20 DIAGNOSIS — E11.9 DIABETES MELLITUS WITHOUT COMPLICATION: ICD-10-CM

## 2022-10-20 DIAGNOSIS — I10 ESSENTIAL HYPERTENSION, MALIGNANT: ICD-10-CM

## 2022-10-20 DIAGNOSIS — N18.30 STAGE 3 CHRONIC KIDNEY DISEASE, UNSPECIFIED WHETHER STAGE 3A OR 3B CKD: Primary | ICD-10-CM

## 2022-10-20 DIAGNOSIS — N18.30 STAGE 3 CHRONIC KIDNEY DISEASE, UNSPECIFIED WHETHER STAGE 3A OR 3B CKD: ICD-10-CM

## 2022-10-20 LAB
ALBUMIN SERPL-MCNC: 4.4 G/DL (ref 3.5–5.2)
ALBUMIN UR-MCNC: 1.4 MG/DL
ALBUMIN/GLOB SERPL: 1.9 G/DL
ALP SERPL-CCNC: 68 U/L (ref 39–117)
ALT SERPL W P-5'-P-CCNC: 13 U/L (ref 1–33)
ANION GAP SERPL CALCULATED.3IONS-SCNC: 14 MMOL/L (ref 5–15)
AST SERPL-CCNC: 18 U/L (ref 1–32)
BACTERIA UR QL AUTO: ABNORMAL /HPF
BASOPHILS # BLD AUTO: 0.07 10*3/MM3 (ref 0–0.2)
BASOPHILS NFR BLD AUTO: 1 % (ref 0–1.5)
BILIRUB SERPL-MCNC: 0.8 MG/DL (ref 0–1.2)
BILIRUB UR QL STRIP: NEGATIVE
BUN SERPL-MCNC: 17 MG/DL (ref 8–23)
BUN/CREAT SERPL: 17.5 (ref 7–25)
CALCIUM SPEC-SCNC: 9.6 MG/DL (ref 8.6–10.5)
CHLORIDE SERPL-SCNC: 107 MMOL/L (ref 98–107)
CLARITY UR: ABNORMAL
CO2 SERPL-SCNC: 22 MMOL/L (ref 22–29)
COLOR UR: YELLOW
CREAT SERPL-MCNC: 0.97 MG/DL (ref 0.57–1)
CREAT UR-MCNC: 157.4 MG/DL
CREAT UR-MCNC: 186.5 MG/DL
DEPRECATED RDW RBC AUTO: 40.3 FL (ref 37–54)
EGFRCR SERPLBLD CKD-EPI 2021: 60.7 ML/MIN/1.73
EOSINOPHIL # BLD AUTO: 0.14 10*3/MM3 (ref 0–0.4)
EOSINOPHIL NFR BLD AUTO: 2 % (ref 0.3–6.2)
ERYTHROCYTE [DISTWIDTH] IN BLOOD BY AUTOMATED COUNT: 12.2 % (ref 12.3–15.4)
GLOBULIN UR ELPH-MCNC: 2.3 GM/DL
GLUCOSE SERPL-MCNC: 105 MG/DL (ref 65–99)
GLUCOSE UR STRIP-MCNC: NEGATIVE MG/DL
HCT VFR BLD AUTO: 36.8 % (ref 34–46.6)
HGB BLD-MCNC: 12.5 G/DL (ref 12–15.9)
HGB UR QL STRIP.AUTO: NEGATIVE
HYALINE CASTS UR QL AUTO: ABNORMAL /LPF
IMM GRANULOCYTES # BLD AUTO: 0.06 10*3/MM3 (ref 0–0.05)
IMM GRANULOCYTES NFR BLD AUTO: 0.9 % (ref 0–0.5)
KETONES UR QL STRIP: ABNORMAL
LEUKOCYTE ESTERASE UR QL STRIP.AUTO: ABNORMAL
LYMPHOCYTES # BLD AUTO: 1.58 10*3/MM3 (ref 0.7–3.1)
LYMPHOCYTES NFR BLD AUTO: 22.8 % (ref 19.6–45.3)
MCH RBC QN AUTO: 30.8 PG (ref 26.6–33)
MCHC RBC AUTO-ENTMCNC: 34 G/DL (ref 31.5–35.7)
MCV RBC AUTO: 90.6 FL (ref 79–97)
MICROALBUMIN/CREAT UR: 8.9 MG/G
MONOCYTES # BLD AUTO: 0.66 10*3/MM3 (ref 0.1–0.9)
MONOCYTES NFR BLD AUTO: 9.5 % (ref 5–12)
NEUTROPHILS NFR BLD AUTO: 4.42 10*3/MM3 (ref 1.7–7)
NEUTROPHILS NFR BLD AUTO: 63.8 % (ref 42.7–76)
NITRITE UR QL STRIP: NEGATIVE
NRBC BLD AUTO-RTO: 0 /100 WBC (ref 0–0.2)
PH UR STRIP.AUTO: 5.5 [PH] (ref 5–8)
PLATELET # BLD AUTO: 290 10*3/MM3 (ref 140–450)
PMV BLD AUTO: 11.8 FL (ref 6–12)
POTASSIUM SERPL-SCNC: 4.3 MMOL/L (ref 3.5–5.2)
PROT ?TM UR-MCNC: 14.7 MG/DL
PROT SERPL-MCNC: 6.7 G/DL (ref 6–8.5)
PROT UR QL STRIP: ABNORMAL
PROT/CREAT UR: 0.08 MG/G{CREAT}
RBC # BLD AUTO: 4.06 10*6/MM3 (ref 3.77–5.28)
RBC # UR STRIP: ABNORMAL /HPF
REF LAB TEST METHOD: ABNORMAL
SODIUM SERPL-SCNC: 143 MMOL/L (ref 136–145)
SP GR UR STRIP: 1.03 (ref 1–1.03)
SQUAMOUS #/AREA URNS HPF: ABNORMAL /HPF
UROBILINOGEN UR QL STRIP: ABNORMAL
WBC # UR STRIP: ABNORMAL /HPF
WBC NRBC COR # BLD: 6.93 10*3/MM3 (ref 3.4–10.8)

## 2022-10-20 PROCEDURE — 36415 COLL VENOUS BLD VENIPUNCTURE: CPT

## 2022-10-20 PROCEDURE — 82043 UR ALBUMIN QUANTITATIVE: CPT

## 2022-10-20 PROCEDURE — 80053 COMPREHEN METABOLIC PANEL: CPT

## 2022-10-20 PROCEDURE — 81001 URINALYSIS AUTO W/SCOPE: CPT

## 2022-10-20 PROCEDURE — 84156 ASSAY OF PROTEIN URINE: CPT

## 2022-10-20 PROCEDURE — 82570 ASSAY OF URINE CREATININE: CPT

## 2022-10-20 PROCEDURE — 85025 COMPLETE CBC W/AUTO DIFF WBC: CPT

## 2022-11-11 ENCOUNTER — OFFICE VISIT (OUTPATIENT)
Dept: FAMILY MEDICINE CLINIC | Facility: CLINIC | Age: 77
End: 2022-11-11

## 2022-11-11 VITALS — OXYGEN SATURATION: 98 % | HEART RATE: 97 BPM | TEMPERATURE: 96.2 F

## 2022-11-11 DIAGNOSIS — J32.9 PURULENT POSTNASAL DRAINAGE: ICD-10-CM

## 2022-11-11 DIAGNOSIS — R05.1 ACUTE COUGH: ICD-10-CM

## 2022-11-11 DIAGNOSIS — J02.9 SORE THROAT: ICD-10-CM

## 2022-11-11 DIAGNOSIS — J06.9 ACUTE URI: Primary | ICD-10-CM

## 2022-11-11 LAB
EXPIRATION DATE: NORMAL
EXPIRATION DATE: NORMAL
FLUAV AG UPPER RESP QL IA.RAPID: NOT DETECTED
FLUBV AG UPPER RESP QL IA.RAPID: NOT DETECTED
INTERNAL CONTROL: NORMAL
INTERNAL CONTROL: NORMAL
Lab: NORMAL
Lab: NORMAL
S PYO AG THROAT QL: NEGATIVE
SARS-COV-2 AG UPPER RESP QL IA.RAPID: NOT DETECTED

## 2022-11-11 PROCEDURE — 87428 SARSCOV & INF VIR A&B AG IA: CPT | Performed by: NURSE PRACTITIONER

## 2022-11-11 PROCEDURE — 87880 STREP A ASSAY W/OPTIC: CPT | Performed by: NURSE PRACTITIONER

## 2022-11-11 PROCEDURE — 99213 OFFICE O/P EST LOW 20 MIN: CPT | Performed by: NURSE PRACTITIONER

## 2022-11-11 RX ORDER — AZITHROMYCIN 250 MG/1
TABLET, FILM COATED ORAL
Qty: 6 TABLET | Refills: 0 | Status: SHIPPED | OUTPATIENT
Start: 2022-11-11 | End: 2023-01-16

## 2022-11-11 RX ORDER — METHYLPREDNISOLONE 4 MG/1
TABLET ORAL
Qty: 1 EACH | Refills: 0 | Status: SHIPPED | OUTPATIENT
Start: 2022-11-11 | End: 2023-01-16

## 2022-11-11 RX ORDER — BENZONATATE 100 MG/1
100 CAPSULE ORAL 3 TIMES DAILY PRN
Qty: 30 CAPSULE | Refills: 0 | Status: SHIPPED | OUTPATIENT
Start: 2022-11-11 | End: 2023-01-16

## 2022-11-11 NOTE — PROGRESS NOTES
Chief Complaint  Sore Throat (Cough, drainage )    Subjective            Deanne Mariya Pittman presents to [facility]  History of Present Illness     Deanne comes into the office today not feeling well since approximately Wednesday of this week.  She endorses chills and body aches, but no known fever.  She has not had headache, runny nose or congestion, but she has had drainage in the back of her throat and sore throat.  She denies any ear pain.  She has had cough, but no wheeze or shortness of breath.  She denies any nausea, vomiting, abdominal pain, or diarrhea.      Past Medical History:   Diagnosis Date   • Cardiomyopathy (HCC)    • Hyperlipidemia    • Hypertension    • Renal cyst     right   • Stage 3 chronic kidney disease (HCC) 2020       Allergies   Allergen Reactions   • Penicillins Unknown - Low Severity        Past Surgical History:   Procedure Laterality Date   • BREAST SURGERY Left     lump removed   • COLON SURGERY     • LIVER SURGERY      exploritory surgery due to puncture to liver    • SUBTOTAL HYSTERECTOMY          Social History     Tobacco Use   • Smoking status: Former     Packs/day: 0.50     Years: 45.00     Pack years: 22.50     Types: Cigarettes     Start date:      Quit date: 2018     Years since quittin.8   • Smokeless tobacco: Never   Vaping Use   • Vaping Use: Never used       Family History   Problem Relation Age of Onset   • Skin cancer Mother         skin carcinoma   • Breast cancer Sister    • Lupus Sister         lupus erythematosus   • Breast cancer Sister    • Rectal cancer Sister         Health Maintenance Due   Topic Date Due   • COVID-19 Vaccine (4 - Booster for Pfizer series) 2022   • INFLUENZA VACCINE  2022        Current Outpatient Medications on File Prior to Visit   Medication Sig   • Accu-Chek Softclix Lancets lancets Check blood glucose once daily fasting and record.   • atorvastatin (LIPITOR) 20 MG tablet Take 1  tablet by mouth Every Night.   • Blood Glucose Monitoring Suppl (Accu-Chek Guide) w/Device kit 1 kit Daily. Check blood glucose once daily fasting and record.   • carvedilol (COREG) 6.25 MG tablet Take 1 tablet by mouth 2 (two) times a day.   • cyanocobalamin (VITAMIN B-12) 1000 MCG tablet Take 1,000 mcg by mouth.   • glucose blood (Accu-Chek Guide) test strip Check blood glucose once daily fasting and record.   • losartan (COZAAR) 25 MG tablet Take 1 tablet by mouth Daily.   • metFORMIN (GLUCOPHAGE) 500 MG tablet Take 1 tablet by mouth 2 (Two) Times a Day With Meals.   • cephalexin (Keflex) 500 MG capsule Take 1 capsule by mouth 2 (Two) Times a Day.     No current facility-administered medications on file prior to visit.       Immunization History   Administered Date(s) Administered   • COVID-19 (PFIZER) PURPLE CAP 03/31/2021, 04/21/2021, 12/09/2021   • Fluzone High-Dose 65+yrs 10/23/2021   • Hepatitis A 08/14/2018, 02/26/2019   • Influenza, Unspecified 10/22/2020   • Pneumococcal Conjugate 13-Valent (PCV13) 12/08/2017   • Pneumococcal Polysaccharide (PPSV23) 12/10/2018   • Shingrix 08/29/2018, 08/19/2019   • Tdap 01/18/2019       Review of Systems     Objective     Pulse 97   Temp 96.2 °F (35.7 °C)   SpO2 98%       Physical Exam  Vitals reviewed.   Constitutional:       General: She is not in acute distress.     Appearance: Normal appearance. She is well-developed.   HENT:      Head: Normocephalic and atraumatic.      Right Ear: Tympanic membrane, ear canal and external ear normal. There is no impacted cerumen.      Left Ear: Tympanic membrane, ear canal and external ear normal. There is no impacted cerumen.      Nose: Nose normal.      Mouth/Throat:      Mouth: Mucous membranes are moist.      Pharynx: Oropharyngeal exudate and posterior oropharyngeal erythema present.      Comments: Purulent drainage in the posterior oropharynx.  Erythema of the posterior oropharynx with mild tonsillar erythema as  well.    Hoarseness of voice noted as well.  Eyes:      General: No scleral icterus.     Extraocular Movements: Extraocular movements intact.      Conjunctiva/sclera: Conjunctivae normal.   Neck:      Trachea: Trachea normal.   Cardiovascular:      Rate and Rhythm: Normal rate and regular rhythm.      Pulses: Normal pulses.      Heart sounds: No murmur heard.  Pulmonary:      Effort: Pulmonary effort is normal. No respiratory distress.      Breath sounds: Normal breath sounds. No wheezing, rhonchi or rales.   Musculoskeletal:         General: Normal range of motion.      Cervical back: Normal range of motion and neck supple.      Right lower leg: No edema.      Left lower leg: No edema.   Lymphadenopathy:      Cervical: No cervical adenopathy.   Skin:     General: Skin is warm and dry.   Neurological:      Mental Status: She is alert and oriented to person, place, and time.   Psychiatric:         Mood and Affect: Mood and affect normal.         Behavior: Behavior normal.         Thought Content: Thought content normal.         Judgment: Judgment normal.         Result Review :     The following data was reviewed by: BETSY Rocha on 11/11/2022:    Strep    Common Labsle 11/11/22   POC Strep A, Molecular Negative         POCT SARS-CoV-2 Antigen DELFINO + Flu (11/11/2022 09:29)                Assessment and Plan      Diagnoses and all orders for this visit:    1. Acute URI (Primary)  -     methylPREDNISolone (MEDROL) 4 MG dose pack; Take as directed on package instructions.  Dispense: 1 each; Refill: 0  -     azithromycin (Zithromax Z-Isiah) 250 MG tablet; Take 2 tablets by mouth on day 1, then 1 tablet daily on days 2-5  Dispense: 6 tablet; Refill: 0    2. Sore throat  -     POCT rapid strep A  -     POCT SARS-CoV-2 Antigen DELFINO + Flu    3. Purulent postnasal drainage    4. Acute cough  -     POCT SARS-CoV-2 Antigen DELFINO + Flu  -     benzonatate (Tessalon Perles) 100 MG capsule; Take 1 capsule by mouth 3 (Three)  Times a Day As Needed for Cough.  Dispense: 30 capsule; Refill: 0            Follow Up     Return if symptoms worsen or fail to improve.    Patient was given instructions and counseling regarding her condition or for health maintenance advice. Please see specific information pulled into the AVS if appropriate.

## 2022-12-10 DIAGNOSIS — E78.2 MIXED HYPERLIPIDEMIA: ICD-10-CM

## 2022-12-13 RX ORDER — ATORVASTATIN CALCIUM 20 MG/1
TABLET, FILM COATED ORAL
Qty: 90 TABLET | Refills: 0 | Status: SHIPPED | OUTPATIENT
Start: 2022-12-13 | End: 2023-04-03 | Stop reason: SDUPTHER

## 2023-01-16 ENCOUNTER — OFFICE VISIT (OUTPATIENT)
Dept: FAMILY MEDICINE CLINIC | Facility: CLINIC | Age: 78
End: 2023-01-16
Payer: MEDICARE

## 2023-01-16 VITALS
DIASTOLIC BLOOD PRESSURE: 64 MMHG | BODY MASS INDEX: 23.2 KG/M2 | TEMPERATURE: 97.5 F | HEIGHT: 61 IN | SYSTOLIC BLOOD PRESSURE: 112 MMHG | OXYGEN SATURATION: 99 % | HEART RATE: 74 BPM | WEIGHT: 122.9 LBS

## 2023-01-16 DIAGNOSIS — E11.22 CONTROLLED TYPE 2 DIABETES MELLITUS WITH STAGE 3 CHRONIC KIDNEY DISEASE, WITHOUT LONG-TERM CURRENT USE OF INSULIN: Primary | ICD-10-CM

## 2023-01-16 DIAGNOSIS — Z78.0 POSTMENOPAUSAL: ICD-10-CM

## 2023-01-16 DIAGNOSIS — I87.2 CHRONIC VENOUS INSUFFICIENCY: ICD-10-CM

## 2023-01-16 DIAGNOSIS — Z87.891 FORMER SMOKER: ICD-10-CM

## 2023-01-16 DIAGNOSIS — Z12.31 ENCOUNTER FOR SCREENING MAMMOGRAM FOR BREAST CANCER: ICD-10-CM

## 2023-01-16 DIAGNOSIS — E78.2 MIXED HYPERLIPIDEMIA: ICD-10-CM

## 2023-01-16 DIAGNOSIS — N18.30 CONTROLLED TYPE 2 DIABETES MELLITUS WITH STAGE 3 CHRONIC KIDNEY DISEASE, WITHOUT LONG-TERM CURRENT USE OF INSULIN: Primary | ICD-10-CM

## 2023-01-16 DIAGNOSIS — Z12.2 SCREENING FOR LUNG CANCER: ICD-10-CM

## 2023-01-16 DIAGNOSIS — I10 ESSENTIAL (PRIMARY) HYPERTENSION: ICD-10-CM

## 2023-01-16 DIAGNOSIS — I42.9 CARDIOMYOPATHY, UNSPECIFIED TYPE: ICD-10-CM

## 2023-01-16 LAB
ALBUMIN SERPL-MCNC: 4.5 G/DL (ref 3.5–5.2)
ALBUMIN UR-MCNC: 1.5 MG/DL
ALBUMIN/GLOB SERPL: 2.1 G/DL
ALP SERPL-CCNC: 72 U/L (ref 39–117)
ALT SERPL W P-5'-P-CCNC: 13 U/L (ref 1–33)
ANION GAP SERPL CALCULATED.3IONS-SCNC: 11 MMOL/L (ref 5–15)
AST SERPL-CCNC: 16 U/L (ref 1–32)
BASOPHILS # BLD AUTO: 0.07 10*3/MM3 (ref 0–0.2)
BASOPHILS NFR BLD AUTO: 1.1 % (ref 0–1.5)
BILIRUB SERPL-MCNC: 0.8 MG/DL (ref 0–1.2)
BUN SERPL-MCNC: 17 MG/DL (ref 8–23)
BUN/CREAT SERPL: 16.2 (ref 7–25)
CALCIUM SPEC-SCNC: 10 MG/DL (ref 8.6–10.5)
CHLORIDE SERPL-SCNC: 103 MMOL/L (ref 98–107)
CHOLEST SERPL-MCNC: 146 MG/DL (ref 0–200)
CO2 SERPL-SCNC: 25 MMOL/L (ref 22–29)
CREAT SERPL-MCNC: 1.05 MG/DL (ref 0.57–1)
CREAT UR-MCNC: 195.2 MG/DL
DEPRECATED RDW RBC AUTO: 41 FL (ref 37–54)
EGFRCR SERPLBLD CKD-EPI 2021: 55.2 ML/MIN/1.73
EOSINOPHIL # BLD AUTO: 0.1 10*3/MM3 (ref 0–0.4)
EOSINOPHIL NFR BLD AUTO: 1.5 % (ref 0.3–6.2)
ERYTHROCYTE [DISTWIDTH] IN BLOOD BY AUTOMATED COUNT: 12.3 % (ref 12.3–15.4)
GLOBULIN UR ELPH-MCNC: 2.1 GM/DL
GLUCOSE SERPL-MCNC: 101 MG/DL (ref 65–99)
HBA1C MFR BLD: 5.8 % (ref 4.8–5.6)
HCT VFR BLD AUTO: 41 % (ref 34–46.6)
HDLC SERPL-MCNC: 69 MG/DL (ref 40–60)
HGB BLD-MCNC: 13.3 G/DL (ref 12–15.9)
IMM GRANULOCYTES # BLD AUTO: 0.03 10*3/MM3 (ref 0–0.05)
IMM GRANULOCYTES NFR BLD AUTO: 0.5 % (ref 0–0.5)
LDLC SERPL CALC-MCNC: 62 MG/DL (ref 0–100)
LDLC/HDLC SERPL: 0.9 {RATIO}
LYMPHOCYTES # BLD AUTO: 1.62 10*3/MM3 (ref 0.7–3.1)
LYMPHOCYTES NFR BLD AUTO: 25.1 % (ref 19.6–45.3)
MCH RBC QN AUTO: 30.2 PG (ref 26.6–33)
MCHC RBC AUTO-ENTMCNC: 32.4 G/DL (ref 31.5–35.7)
MCV RBC AUTO: 93 FL (ref 79–97)
MICROALBUMIN/CREAT UR: 7.7 MG/G
MONOCYTES # BLD AUTO: 0.52 10*3/MM3 (ref 0.1–0.9)
MONOCYTES NFR BLD AUTO: 8 % (ref 5–12)
NEUTROPHILS NFR BLD AUTO: 4.12 10*3/MM3 (ref 1.7–7)
NEUTROPHILS NFR BLD AUTO: 63.8 % (ref 42.7–76)
NRBC BLD AUTO-RTO: 0 /100 WBC (ref 0–0.2)
PLATELET # BLD AUTO: 303 10*3/MM3 (ref 140–450)
PMV BLD AUTO: 12.2 FL (ref 6–12)
POTASSIUM SERPL-SCNC: 5.1 MMOL/L (ref 3.5–5.2)
PROT SERPL-MCNC: 6.6 G/DL (ref 6–8.5)
RBC # BLD AUTO: 4.41 10*6/MM3 (ref 3.77–5.28)
SODIUM SERPL-SCNC: 139 MMOL/L (ref 136–145)
T4 FREE SERPL-MCNC: 1.34 NG/DL (ref 0.93–1.7)
TRIGL SERPL-MCNC: 76 MG/DL (ref 0–150)
TSH SERPL DL<=0.05 MIU/L-ACNC: 2.69 UIU/ML (ref 0.27–4.2)
VLDLC SERPL-MCNC: 15 MG/DL (ref 5–40)
WBC NRBC COR # BLD: 6.46 10*3/MM3 (ref 3.4–10.8)

## 2023-01-16 PROCEDURE — 83036 HEMOGLOBIN GLYCOSYLATED A1C: CPT | Performed by: NURSE PRACTITIONER

## 2023-01-16 PROCEDURE — 82570 ASSAY OF URINE CREATININE: CPT | Performed by: NURSE PRACTITIONER

## 2023-01-16 PROCEDURE — 99214 OFFICE O/P EST MOD 30 MIN: CPT | Performed by: NURSE PRACTITIONER

## 2023-01-16 PROCEDURE — 80061 LIPID PANEL: CPT | Performed by: NURSE PRACTITIONER

## 2023-01-16 PROCEDURE — 84443 ASSAY THYROID STIM HORMONE: CPT | Performed by: NURSE PRACTITIONER

## 2023-01-16 PROCEDURE — 84439 ASSAY OF FREE THYROXINE: CPT | Performed by: NURSE PRACTITIONER

## 2023-01-16 PROCEDURE — 85025 COMPLETE CBC W/AUTO DIFF WBC: CPT | Performed by: NURSE PRACTITIONER

## 2023-01-16 PROCEDURE — 36415 COLL VENOUS BLD VENIPUNCTURE: CPT | Performed by: NURSE PRACTITIONER

## 2023-01-16 PROCEDURE — 80053 COMPREHEN METABOLIC PANEL: CPT | Performed by: NURSE PRACTITIONER

## 2023-01-16 PROCEDURE — 82043 UR ALBUMIN QUANTITATIVE: CPT | Performed by: NURSE PRACTITIONER

## 2023-01-16 NOTE — PROGRESS NOTES
Chief Complaint  Hyperlipidemia and Hypertension (Pt here for 6 month f/u as well as fasting labs.)    Subjective            Deanne Mariya Pittman presents to [facility]  History of Present Illness     Deanne comes into the office today for 6-month follow-up.  She is fasting for her lab work this morning.    She has hypertension with cardiomyopathy, previously known to Dr. Vinod Correa with [facility] cardiology team.  She is no longer seeing him.  Blood pressure is normotensive on exam today, 112/64.  She denies any chest pain, palpitations, headaches, dizziness, or shortness of breath.  She does have chronic venous insufficiency, but lower extremity edema has been stable.    She is taking Lipitor for mixed dyslipidemia.  No complaints of myalgia with use.    Her blood sugar has been controlled with diet and metformin.  She has only been able to tolerate metformin immediate release 500 mg twice daily.  She denies any polyuria, polydipsia, or polyphagia.  Her last hemoglobin A1c was 5.7% 6 months ago.  Her eye exam is up-to-date.  She denies any neuropathy symptoms.    She does have stage III chronic kidney disease and is under the care of Dr. Davis -she states that everything has been looking good in regards to her kidneys, at least that is what she was told at her last appointment.    PHQ-2 Total Score: 0      Past Medical History:   Diagnosis Date   • Cardiomyopathy (HCC)    • Hyperlipidemia    • Hypertension    • Renal cyst     right   • Stage 3 chronic kidney disease (HCC) 06/12/2020       Allergies   Allergen Reactions   • Penicillins Unknown - Low Severity        Past Surgical History:   Procedure Laterality Date   • BREAST SURGERY Left     lump removed   • COLON SURGERY     • LIVER SURGERY      exploritory surgery due to puncture to liver    • SUBTOTAL HYSTERECTOMY          Social History     Tobacco Use   • Smoking status: Former     Packs/day: 0.50     Years: 45.00      Pack years: 22.50     Types: Cigarettes     Start date:      Quit date: 2018     Years since quittin.0   • Smokeless tobacco: Never   Vaping Use   • Vaping Use: Never used   Substance Use Topics   • Alcohol use: Never   • Drug use: Never       Family History   Problem Relation Age of Onset   • Skin cancer Mother         skin carcinoma   • Breast cancer Sister    • Lupus Sister         lupus erythematosus   • Breast cancer Sister    • Rectal cancer Sister         Health Maintenance Due   Topic Date Due   • HEMOGLOBIN A1C  2023        Current Outpatient Medications on File Prior to Visit   Medication Sig   • Accu-Chek Softclix Lancets lancets Check blood glucose once daily fasting and record.   • atorvastatin (LIPITOR) 20 MG tablet TAKE 1 TABLET EVERY NIGHT   • Blood Glucose Monitoring Suppl (Accu-Chek Guide) w/Device kit 1 kit Daily. Check blood glucose once daily fasting and record.   • carvedilol (COREG) 6.25 MG tablet Take 1 tablet by mouth 2 (two) times a day.   • cyanocobalamin (VITAMIN B-12) 1000 MCG tablet Take 1,000 mcg by mouth.   • glucose blood (Accu-Chek Guide) test strip Check blood glucose once daily fasting and record.   • losartan (COZAAR) 25 MG tablet Take 1 tablet by mouth Daily.   • metFORMIN (GLUCOPHAGE) 500 MG tablet Take 1 tablet by mouth 2 (Two) Times a Day With Meals.   • [DISCONTINUED] azithromycin (Zithromax Z-Isiah) 250 MG tablet Take 2 tablets by mouth on day 1, then 1 tablet daily on days 2-5   • [DISCONTINUED] benzonatate (Tessalon Perles) 100 MG capsule Take 1 capsule by mouth 3 (Three) Times a Day As Needed for Cough.   • [DISCONTINUED] cephalexin (Keflex) 500 MG capsule Take 1 capsule by mouth 2 (Two) Times a Day.   • [DISCONTINUED] methylPREDNISolone (MEDROL) 4 MG dose pack Take as directed on package instructions.     No current facility-administered medications on file prior to visit.       Immunization History   Administered Date(s) Administered   • COVID-19  "(PFIZER) BIVALENT BOOSTER 12+YRS 12/15/2022   • COVID-19 (PFIZER) PURPLE CAP 03/31/2021, 04/21/2021, 12/09/2021   • Fluzone High-Dose 65+yrs 10/23/2021, 11/29/2022   • Hepatitis A 08/14/2018, 02/26/2019   • Influenza, Unspecified 10/22/2020   • Pneumococcal Conjugate 13-Valent (PCV13) 12/08/2017   • Pneumococcal Polysaccharide (PPSV23) 12/10/2018   • Shingrix 08/29/2018, 08/19/2019   • Tdap 01/18/2019       Review of Systems     Objective     /64 (BP Location: Left arm, Patient Position: Sitting, Cuff Size: Adult)   Pulse 74   Temp 97.5 °F (36.4 °C) (Temporal)   Ht 154.9 cm (61\")   Wt 55.7 kg (122 lb 14.4 oz)   SpO2 99%   BMI 23.22 kg/m²       Physical Exam  Vitals reviewed.   Constitutional:       General: She is not in acute distress.     Appearance: Normal appearance. She is well-developed and normal weight.   HENT:      Head: Normocephalic and atraumatic.   Eyes:      General: No scleral icterus.        Right eye: No discharge.         Left eye: No discharge.      Extraocular Movements: Extraocular movements intact.      Conjunctiva/sclera: Conjunctivae normal.   Neck:      Thyroid: No thyroid mass, thyromegaly or thyroid tenderness.      Vascular: No carotid bruit.      Trachea: Trachea normal.   Cardiovascular:      Rate and Rhythm: Normal rate and regular rhythm.      Pulses: Normal pulses.      Heart sounds: No murmur heard.  Pulmonary:      Effort: Pulmonary effort is normal. No respiratory distress.      Breath sounds: Normal breath sounds. No wheezing, rhonchi or rales.   Musculoskeletal:         General: Normal range of motion.      Cervical back: Normal range of motion and neck supple.      Right lower leg: No edema.      Left lower leg: No edema.   Lymphadenopathy:      Cervical: No cervical adenopathy.   Skin:     General: Skin is warm and dry.   Neurological:      Mental Status: She is alert and oriented to person, place, and time.   Psychiatric:         Mood and Affect: Mood and affect " normal.         Behavior: Behavior normal.         Thought Content: Thought content normal.         Judgment: Judgment normal.         Result Review :     The following data was reviewed by: BETSY Rocha on 01/16/2023:    CMP    CMP 4/21/22 7/13/22 10/20/22   Glucose 103 (A) 112 (A) 105 (A)   BUN 21 20 17   Creatinine 1.01 (A) 1.04 (A) 0.97   eGFR 58.2 (A) 56.2 (A) 60.7   Sodium 139 137 143   Potassium 4.9 4.7 4.3   Chloride 103 106 107   Calcium 9.7 9.2 9.6   Total Protein 6.5 6.5 6.7   Albumin 4.10 4.50 4.40   Globulin 2.4 2.0 2.3   Total Bilirubin 0.8 0.7 0.8   Alkaline Phosphatase 75 67 68   AST (SGOT) 17 12 18   ALT (SGPT) 17 12 13   Albumin/Globulin Ratio 1.7 2.3 1.9   BUN/Creatinine Ratio 20.8 19.2 17.5   Anion Gap 13.0 10.5 14.0   (A) Abnormal value       Comments are available for some flowsheets but are not being displayed.           CBC    CBC 4/21/22 7/13/22 10/20/22   WBC 7.13 7.15 6.93   RBC 4.13 4.11 4.06   Hemoglobin 12.7 12.6 12.5   Hematocrit 38.6 38.3 36.8   MCV 93.5 93.2 90.6   MCH 30.8 30.7 30.8   MCHC 32.9 32.9 34.0   RDW 12.5 12.1 (A) 12.2 (A)   Platelets 280 292 290   (A) Abnormal value            Lipid Panel    Lipid Panel 7/13/22   Total Cholesterol 126   Triglycerides 89   HDL Cholesterol 52   VLDL Cholesterol 17   LDL Cholesterol  57   LDL/HDL Ratio 1.08           TSH    TSH 7/13/22   TSH 2.020           A1C Last 3 Results    HGBA1C Last 3 Results 4/14/22 7/13/22   Hemoglobin A1C 5.70 (A) 5.70 (A)   (A) Abnormal value            Microalbumin    Microalbumin 7/13/22 10/20/22   Microalbumin, Urine 3.9 1.4             Data reviewed: Radiologic studies :   DEXA Bone Density Axial (06/29/2021 11:00)  Mammo Screening Bilateral With CAD (07/05/2022 10:51)  CT Chest Low Dose Cancer Screening WO (07/20/2022 10:04)           Assessment and Plan      Diagnoses and all orders for this visit:    1. Controlled type 2 diabetes mellitus with stage 3 chronic kidney disease, without long-term  current use of insulin (HCC) (Primary)  -     CBC Auto Differential  -     Comprehensive Metabolic Panel  -     Hemoglobin A1c  -     Lipid Panel  -     TSH+Free T4  -     Microalbumin / Creatinine Urine Ratio - Urine, Clean Catch    2. Essential (primary) hypertension  -     CBC Auto Differential  -     Comprehensive Metabolic Panel  -     Lipid Panel  -     TSH+Free T4  -     Microalbumin / Creatinine Urine Ratio - Urine, Clean Catch    3. Cardiomyopathy, unspecified type (HCC)    4. Chronic venous insufficiency    5. Mixed hyperlipidemia  -     Comprehensive Metabolic Panel  -     Lipid Panel    6. Screening for lung cancer  -      CT Chest Low Dose Cancer Screening WO; Future    7. Former smoker  -      CT Chest Low Dose Cancer Screening WO; Future    8. Encounter for screening mammogram for breast cancer  -     Mammo Screening Digital Tomosynthesis Bilateral With CAD; Future    9. Postmenopausal  -     DEXA Bone Density Axial; Future            Follow Up     Return in about 27 weeks (around 7/24/2023) for Medicare Wellness, medication refills and fasting labs.     We will get her fasting labs today.  She requests to contact us when she needs prescription refills.  Instead of [facility], she is now using [facility] home delivery.    I will go ahead and arrange for her screening tests to include CT of the chest, mammogram, and DEXA, as those will be due prior to her next follow-up appointment.    Patient was given instructions and counseling regarding her condition or for health maintenance advice. Please see specific information pulled into the AVS if appropriate.     Deanne Meraz Zain  reports that she quit smoking about 5 years ago. Her smoking use included cigarettes. She started smoking about 50 years ago. She has a 22.50 pack-year smoking history. She has never used smokeless tobacco.

## 2023-01-16 NOTE — PROGRESS NOTES
Venipuncture Blood Specimen Collection  Venipuncture performed in left arm by Geraldine Aldrich with good hemostasis. Patient tolerated the procedure well without complications.   01/16/23   Geraldine Aldrich

## 2023-04-01 ENCOUNTER — TELEPHONE (OUTPATIENT)
Dept: FAMILY MEDICINE CLINIC | Facility: CLINIC | Age: 78
End: 2023-04-01
Payer: MEDICARE

## 2023-04-01 DIAGNOSIS — E78.2 MIXED HYPERLIPIDEMIA: ICD-10-CM

## 2023-04-03 ENCOUNTER — TELEPHONE (OUTPATIENT)
Dept: FAMILY MEDICINE CLINIC | Facility: CLINIC | Age: 78
End: 2023-04-03
Payer: MEDICARE

## 2023-04-03 RX ORDER — ATORVASTATIN CALCIUM 20 MG/1
20 TABLET, FILM COATED ORAL NIGHTLY
Qty: 90 TABLET | Refills: 1 | Status: SHIPPED | OUTPATIENT
Start: 2023-04-03 | End: 2023-04-03 | Stop reason: SDUPTHER

## 2023-04-03 RX ORDER — ATORVASTATIN CALCIUM 20 MG/1
20 TABLET, FILM COATED ORAL NIGHTLY
Qty: 90 TABLET | Refills: 1 | Status: SHIPPED | OUTPATIENT
Start: 2023-04-03

## 2023-04-03 NOTE — TELEPHONE ENCOUNTER
CANCELLED THE RX TO [facility] IN [place] AND CALLED AND NOTIFIED THE PT THAT IT HAS BEEN SENT TO MAIL ORDER

## 2023-04-03 NOTE — TELEPHONE ENCOUNTER
Caller: Deanne Pittman    Relationship: Self    Best call back number: [phone]    Do you know the name of the person who called: TRIP    What was the call regarding: PATIENT IS CALLING BACK.     Do you require a callback: YES

## 2023-04-19 ENCOUNTER — LAB (OUTPATIENT)
Dept: LAB | Facility: HOSPITAL | Age: 78
End: 2023-04-19
Payer: MEDICARE

## 2023-04-19 ENCOUNTER — TRANSCRIBE ORDERS (OUTPATIENT)
Dept: ADMINISTRATIVE | Facility: HOSPITAL | Age: 78
End: 2023-04-19
Payer: MEDICARE

## 2023-04-19 DIAGNOSIS — I10 ESSENTIAL HYPERTENSION, MALIGNANT: ICD-10-CM

## 2023-04-19 DIAGNOSIS — N18.30 STAGE 3 CHRONIC KIDNEY DISEASE, UNSPECIFIED WHETHER STAGE 3A OR 3B CKD: Primary | ICD-10-CM

## 2023-04-19 DIAGNOSIS — N18.30 STAGE 3 CHRONIC KIDNEY DISEASE, UNSPECIFIED WHETHER STAGE 3A OR 3B CKD: ICD-10-CM

## 2023-04-19 LAB
ALBUMIN SERPL-MCNC: 4.8 G/DL (ref 3.5–5.2)
ALBUMIN/GLOB SERPL: 2.2 G/DL
ALP SERPL-CCNC: 77 U/L (ref 39–117)
ALT SERPL W P-5'-P-CCNC: 17 U/L (ref 1–33)
ANION GAP SERPL CALCULATED.3IONS-SCNC: 11 MMOL/L (ref 5–15)
AST SERPL-CCNC: 24 U/L (ref 1–32)
BILIRUB SERPL-MCNC: 0.8 MG/DL (ref 0–1.2)
BUN SERPL-MCNC: 21 MG/DL (ref 8–23)
BUN/CREAT SERPL: 20.6 (ref 7–25)
CALCIUM SPEC-SCNC: 9.7 MG/DL (ref 8.6–10.5)
CHLORIDE SERPL-SCNC: 104 MMOL/L (ref 98–107)
CO2 SERPL-SCNC: 24 MMOL/L (ref 22–29)
CREAT SERPL-MCNC: 1.02 MG/DL (ref 0.57–1)
EGFRCR SERPLBLD CKD-EPI 2021: 57.1 ML/MIN/1.73
GLOBULIN UR ELPH-MCNC: 2.2 GM/DL
GLUCOSE SERPL-MCNC: 101 MG/DL (ref 65–99)
POTASSIUM SERPL-SCNC: 4.9 MMOL/L (ref 3.5–5.2)
PROT SERPL-MCNC: 7 G/DL (ref 6–8.5)
SODIUM SERPL-SCNC: 139 MMOL/L (ref 136–145)

## 2023-04-19 PROCEDURE — 80053 COMPREHEN METABOLIC PANEL: CPT

## 2023-04-19 PROCEDURE — 36415 COLL VENOUS BLD VENIPUNCTURE: CPT

## 2023-04-25 ENCOUNTER — OFFICE VISIT (OUTPATIENT)
Dept: FAMILY MEDICINE CLINIC | Facility: CLINIC | Age: 78
End: 2023-04-25
Payer: MEDICARE

## 2023-04-25 VITALS
HEART RATE: 104 BPM | OXYGEN SATURATION: 95 % | BODY MASS INDEX: 22.73 KG/M2 | WEIGHT: 120.4 LBS | HEIGHT: 61 IN | TEMPERATURE: 97.3 F

## 2023-04-25 DIAGNOSIS — J02.9 PHARYNGITIS, UNSPECIFIED ETIOLOGY: Primary | ICD-10-CM

## 2023-04-25 PROCEDURE — 99213 OFFICE O/P EST LOW 20 MIN: CPT | Performed by: FAMILY MEDICINE

## 2023-04-25 RX ORDER — METHYLPREDNISOLONE 4 MG/1
TABLET ORAL
Qty: 21 TABLET | Refills: 0 | Status: SHIPPED | OUTPATIENT
Start: 2023-04-25

## 2023-04-25 RX ORDER — AZITHROMYCIN 250 MG/1
TABLET, FILM COATED ORAL
Qty: 6 TABLET | Refills: 0 | Status: SHIPPED | OUTPATIENT
Start: 2023-04-25

## 2023-04-25 NOTE — PROGRESS NOTES
"Chief Complaint    Sore Throat (Sore throat, nasal drainage/congestion x couple days.)    Subjective      Deanne Mariya Pittman presents to [facility]    History of Present Illness    1.) ACUTE ILLNESS : Onset - 2 days. Per pt - bothersome sore throat, nasal/sinus congestion. No c/o fevers or chills.     Objective     Vital Signs:     Pulse 104   Temp 97.3 °F (36.3 °C) (Temporal)   Ht 154.9 cm (61\")   Wt 54.6 kg (120 lb 6.4 oz)   SpO2 95%   BMI 22.75 kg/m²       Physical Exam  Vitals reviewed.   Constitutional:       General: She is not in acute distress.     Appearance: Normal appearance. She is well-developed.   HENT:      Head: Normocephalic and atraumatic.      Right Ear: Hearing and external ear normal.      Left Ear: Hearing and external ear normal.      Nose: Congestion present.      Mouth/Throat:      Pharynx: Posterior oropharyngeal erythema present.   Eyes:      General: Lids are normal.         Right eye: No discharge.         Left eye: No discharge.      Conjunctiva/sclera: Conjunctivae normal.   Pulmonary:      Effort: Pulmonary effort is normal.      Breath sounds: Normal breath sounds.   Abdominal:      General: There is no distension.   Musculoskeletal:         General: No swelling.      Cervical back: Neck supple.   Skin:     Coloration: Skin is not jaundiced.      Findings: No erythema.   Neurological:      Mental Status: She is alert. Mental status is at baseline.   Psychiatric:         Mood and Affect: Mood and affect normal.         Thought Content: Thought content normal.     Assessment and Plan     Diagnoses and all orders for this visit:    1. Pharyngitis, unspecified etiology (Primary)  Comments:  Start steroid dose isiah as noted. Abx as noted. Adequate fluids and rest.     Other orders  -     azithromycin (Zithromax Z-Isiah) 250 MG tablet; Take 2 tablets by mouth on day 1, then 1 tablet daily on days 2-5  Dispense: 6 tablet; Refill: 0  -     methylPREDNISolone " (MEDROL) 4 MG dose pack; Take as directed on package instructions.  Dispense: 21 tablet; Refill: 0    Follow Up : PRN.     Patient was given instructions and counseling regarding her condition or for health maintenance advice. Please see specific information pulled into the AVS if appropriate.

## 2023-07-24 ENCOUNTER — OFFICE VISIT (OUTPATIENT)
Dept: FAMILY MEDICINE CLINIC | Facility: CLINIC | Age: 78
End: 2023-07-24
Payer: MEDICARE

## 2023-07-24 ENCOUNTER — HOSPITAL ENCOUNTER (OUTPATIENT)
Dept: CT IMAGING | Facility: HOSPITAL | Age: 78
Discharge: HOME | End: 2023-07-24
Admitting: NURSE PRACTITIONER
Payer: MEDICARE

## 2023-07-24 VITALS
SYSTOLIC BLOOD PRESSURE: 120 MMHG | BODY MASS INDEX: 22.84 KG/M2 | OXYGEN SATURATION: 95 % | HEIGHT: 61 IN | TEMPERATURE: 97 F | WEIGHT: 121 LBS | DIASTOLIC BLOOD PRESSURE: 80 MMHG | HEART RATE: 80 BPM

## 2023-07-24 DIAGNOSIS — Z12.2 SCREENING FOR LUNG CANCER: ICD-10-CM

## 2023-07-24 DIAGNOSIS — Z87.891 FORMER SMOKER: ICD-10-CM

## 2023-07-24 DIAGNOSIS — E78.2 MIXED HYPERLIPIDEMIA: ICD-10-CM

## 2023-07-24 DIAGNOSIS — N18.30 CONTROLLED TYPE 2 DIABETES MELLITUS WITH STAGE 3 CHRONIC KIDNEY DISEASE, WITHOUT LONG-TERM CURRENT USE OF INSULIN: ICD-10-CM

## 2023-07-24 DIAGNOSIS — Z00.00 MEDICARE ANNUAL WELLNESS VISIT, SUBSEQUENT: Primary | ICD-10-CM

## 2023-07-24 DIAGNOSIS — E11.22 CONTROLLED TYPE 2 DIABETES MELLITUS WITH STAGE 3 CHRONIC KIDNEY DISEASE, WITHOUT LONG-TERM CURRENT USE OF INSULIN: ICD-10-CM

## 2023-07-24 LAB
ALBUMIN SERPL-MCNC: 4.8 G/DL (ref 3.5–5.2)
ALBUMIN UR-MCNC: <1.2 MG/DL
ALBUMIN/GLOB SERPL: 2.8 G/DL
ALP SERPL-CCNC: 75 U/L (ref 39–117)
ALT SERPL W P-5'-P-CCNC: 16 U/L (ref 1–33)
ANION GAP SERPL CALCULATED.3IONS-SCNC: 10.4 MMOL/L (ref 5–15)
AST SERPL-CCNC: 16 U/L (ref 1–32)
BASOPHILS # BLD AUTO: 0.08 10*3/MM3 (ref 0–0.2)
BASOPHILS NFR BLD AUTO: 1.1 % (ref 0–1.5)
BILIRUB SERPL-MCNC: 0.9 MG/DL (ref 0–1.2)
BUN SERPL-MCNC: 22 MG/DL (ref 8–23)
BUN/CREAT SERPL: 18.6 (ref 7–25)
CALCIUM SPEC-SCNC: 9.7 MG/DL (ref 8.6–10.5)
CHLORIDE SERPL-SCNC: 103 MMOL/L (ref 98–107)
CHOLEST SERPL-MCNC: 144 MG/DL (ref 0–200)
CO2 SERPL-SCNC: 24.6 MMOL/L (ref 22–29)
CREAT SERPL-MCNC: 1.18 MG/DL (ref 0.57–1)
CREAT UR-MCNC: 59 MG/DL
DEPRECATED RDW RBC AUTO: 41 FL (ref 37–54)
EGFRCR SERPLBLD CKD-EPI 2021: 48 ML/MIN/1.73
EOSINOPHIL # BLD AUTO: 0.15 10*3/MM3 (ref 0–0.4)
EOSINOPHIL NFR BLD AUTO: 2 % (ref 0.3–6.2)
ERYTHROCYTE [DISTWIDTH] IN BLOOD BY AUTOMATED COUNT: 12 % (ref 12.3–15.4)
GLOBULIN UR ELPH-MCNC: 1.7 GM/DL
GLUCOSE SERPL-MCNC: 105 MG/DL (ref 65–99)
HBA1C MFR BLD: 5.8 % (ref 4.8–5.6)
HCT VFR BLD AUTO: 39.7 % (ref 34–46.6)
HDLC SERPL-MCNC: 73 MG/DL (ref 40–60)
HGB BLD-MCNC: 13 G/DL (ref 12–15.9)
IMM GRANULOCYTES # BLD AUTO: 0.03 10*3/MM3 (ref 0–0.05)
IMM GRANULOCYTES NFR BLD AUTO: 0.4 % (ref 0–0.5)
LDLC SERPL CALC-MCNC: 52 MG/DL (ref 0–100)
LDLC/HDLC SERPL: 0.68 {RATIO}
LYMPHOCYTES # BLD AUTO: 2.09 10*3/MM3 (ref 0.7–3.1)
LYMPHOCYTES NFR BLD AUTO: 27.5 % (ref 19.6–45.3)
MCH RBC QN AUTO: 30.4 PG (ref 26.6–33)
MCHC RBC AUTO-ENTMCNC: 32.7 G/DL (ref 31.5–35.7)
MCV RBC AUTO: 93 FL (ref 79–97)
MICROALBUMIN/CREAT UR: NORMAL MG/G{CREAT}
MONOCYTES # BLD AUTO: 0.68 10*3/MM3 (ref 0.1–0.9)
MONOCYTES NFR BLD AUTO: 8.9 % (ref 5–12)
NEUTROPHILS NFR BLD AUTO: 4.57 10*3/MM3 (ref 1.7–7)
NEUTROPHILS NFR BLD AUTO: 60.1 % (ref 42.7–76)
NRBC BLD AUTO-RTO: 0 /100 WBC (ref 0–0.2)
PLATELET # BLD AUTO: 303 10*3/MM3 (ref 140–450)
PMV BLD AUTO: 11.5 FL (ref 6–12)
POTASSIUM SERPL-SCNC: 5.1 MMOL/L (ref 3.5–5.2)
PROT SERPL-MCNC: 6.5 G/DL (ref 6–8.5)
RBC # BLD AUTO: 4.27 10*6/MM3 (ref 3.77–5.28)
SODIUM SERPL-SCNC: 138 MMOL/L (ref 136–145)
T4 FREE SERPL-MCNC: 1.23 NG/DL (ref 0.93–1.7)
TRIGL SERPL-MCNC: 105 MG/DL (ref 0–150)
TSH SERPL DL<=0.05 MIU/L-ACNC: 3.15 UIU/ML (ref 0.27–4.2)
VLDLC SERPL-MCNC: 19 MG/DL (ref 5–40)
WBC NRBC COR # BLD: 7.6 10*3/MM3 (ref 3.4–10.8)

## 2023-07-24 PROCEDURE — 82043 UR ALBUMIN QUANTITATIVE: CPT | Performed by: NURSE PRACTITIONER

## 2023-07-24 PROCEDURE — 80061 LIPID PANEL: CPT | Performed by: NURSE PRACTITIONER

## 2023-07-24 PROCEDURE — 82570 ASSAY OF URINE CREATININE: CPT | Performed by: NURSE PRACTITIONER

## 2023-07-24 PROCEDURE — 1159F MED LIST DOCD IN RCRD: CPT | Performed by: NURSE PRACTITIONER

## 2023-07-24 PROCEDURE — 80053 COMPREHEN METABOLIC PANEL: CPT | Performed by: NURSE PRACTITIONER

## 2023-07-24 PROCEDURE — 83036 HEMOGLOBIN GLYCOSYLATED A1C: CPT | Performed by: NURSE PRACTITIONER

## 2023-07-24 PROCEDURE — 1160F RVW MEDS BY RX/DR IN RCRD: CPT | Performed by: NURSE PRACTITIONER

## 2023-07-24 PROCEDURE — 84439 ASSAY OF FREE THYROXINE: CPT | Performed by: NURSE PRACTITIONER

## 2023-07-24 PROCEDURE — 3074F SYST BP LT 130 MM HG: CPT | Performed by: NURSE PRACTITIONER

## 2023-07-24 PROCEDURE — G0439 PPPS, SUBSEQ VISIT: HCPCS | Performed by: NURSE PRACTITIONER

## 2023-07-24 PROCEDURE — 96160 PT-FOCUSED HLTH RISK ASSMT: CPT | Performed by: NURSE PRACTITIONER

## 2023-07-24 PROCEDURE — 84443 ASSAY THYROID STIM HORMONE: CPT | Performed by: NURSE PRACTITIONER

## 2023-07-24 PROCEDURE — 36415 COLL VENOUS BLD VENIPUNCTURE: CPT | Performed by: NURSE PRACTITIONER

## 2023-07-24 PROCEDURE — 71271 CT THORAX LUNG CANCER SCR C-: CPT

## 2023-07-24 PROCEDURE — 1170F FXNL STATUS ASSESSED: CPT | Performed by: NURSE PRACTITIONER

## 2023-07-24 PROCEDURE — 3079F DIAST BP 80-89 MM HG: CPT | Performed by: NURSE PRACTITIONER

## 2023-07-24 PROCEDURE — 85025 COMPLETE CBC W/AUTO DIFF WBC: CPT | Performed by: NURSE PRACTITIONER

## 2023-07-24 RX ORDER — ATORVASTATIN CALCIUM 20 MG/1
20 TABLET, FILM COATED ORAL NIGHTLY
Qty: 90 TABLET | Refills: 1 | Status: SHIPPED | OUTPATIENT
Start: 2023-07-24 | End: 2023-07-24 | Stop reason: SDUPTHER

## 2023-07-24 RX ORDER — ATORVASTATIN CALCIUM 20 MG/1
20 TABLET, FILM COATED ORAL NIGHTLY
Qty: 90 TABLET | Refills: 1 | Status: SHIPPED | OUTPATIENT
Start: 2023-07-24

## 2023-07-24 NOTE — PROGRESS NOTES
The ABCs of the Annual Wellness Visit  Subsequent Medicare Wellness Visit    Monika Pittman is a 77 y.o. female who presents for a Subsequent Medicare Wellness Visit.    The following portions of the patient's history were reviewed and updated as appropriate: allergies, current medications, past family history, past medical history, past social history, past surgical history, and problem list.    Compared to one year ago, the patient feels her physical health is better.    Compared to one year ago, the patient feels her mental health is the same.    Recent Hospitalizations:  She was not admitted to the hospital during the last year.       Current Medical Providers:  Patient Care Team:  Huyen Damon APRN as PCP - General (Nurse Practitioner)  Areli Davis MD as Consulting Physician (Nephrology)  Sony Gutierrez MD as Consulting Physician (Ophthalmology)    Outpatient Medications Prior to Visit   Medication Sig Dispense Refill    Accu-Chek Softclix Lancets lancets Check blood glucose once daily fasting and record. 100 each 3    Blood Glucose Monitoring Suppl (Accu-Chek Guide) w/Device kit 1 kit Daily. Check blood glucose once daily fasting and record. 1 kit 0    carvedilol (COREG) 6.25 MG tablet Take 1 tablet by mouth 2 (two) times a day.      cyanocobalamin (VITAMIN B-12) 1000 MCG tablet Take 1 tablet by mouth.      glucose blood (Accu-Chek Guide) test strip Check blood glucose once daily fasting and record. 100 each 3    losartan (COZAAR) 25 MG tablet Take 1 tablet by mouth Daily.      atorvastatin (LIPITOR) 20 MG tablet Take 1 tablet by mouth Every Night. 90 tablet 1    metFORMIN (GLUCOPHAGE) 500 MG tablet Take 1 tablet by mouth 2 (Two) Times a Day With Meals. 180 tablet 1    azithromycin (Zithromax Z-Isiah) 250 MG tablet Take 2 tablets by mouth on day 1, then 1 tablet daily on days 2-5 6 tablet 0    methylPREDNISolone (MEDROL) 4 MG dose pack Take as directed on package  "instructions. 21 tablet 0     No facility-administered medications prior to visit.       No opioid medication identified on active medication list. I have reviewed chart for other potential  high risk medication/s and harmful drug interactions in the elderly.        Aspirin is not on active medication list.  Aspirin use is not indicated based on review of current medical condition/s. Risk of harm outweighs potential benefits.      Patient Active Problem List   Diagnosis    Cardiomyopathy    Chronic venous insufficiency    Hyperlipidemia    Essential (primary) hypertension    Stage III chronic kidney disease    Renal cyst     Advance Care Planning   Advance Care Planning     Advance Directive is on file.  ACP discussion was held with the patient during this visit. Patient has an advance directive in EMR which is still valid.      Objective    Vitals:    07/24/23 0849   BP: 120/80   Pulse: 80   Temp: 97 °F (36.1 °C)   SpO2: 95%   Weight: 54.9 kg (121 lb)   Height: 153.7 cm (60.5\")     Estimated body mass index is 23.24 kg/m² as calculated from the following:    Height as of this encounter: 153.7 cm (60.5\").    Weight as of this encounter: 54.9 kg (121 lb).    BMI is within normal parameters. No other follow-up for BMI required. She has lost 25 pounds since January of last year. She has lost nearly 35 pounds since her diabetes diagnosis.       Does the patient have evidence of cognitive impairment? No    Physical Exam  Vitals reviewed.   Constitutional:       General: She is not in acute distress.     Appearance: Normal appearance. She is well-developed and normal weight.   HENT:      Head: Normocephalic and atraumatic.   Eyes:      General: No scleral icterus.     Extraocular Movements: Extraocular movements intact.      Conjunctiva/sclera: Conjunctivae normal.   Neck:      Thyroid: No thyroid mass, thyromegaly or thyroid tenderness.      Vascular: No carotid bruit.      Trachea: Trachea normal.   Cardiovascular:      " Rate and Rhythm: Normal rate and regular rhythm.      Pulses: Normal pulses.      Heart sounds: No murmur heard.  Pulmonary:      Effort: Pulmonary effort is normal. No respiratory distress.      Breath sounds: Normal breath sounds. No wheezing, rhonchi or rales.   Musculoskeletal:         General: Normal range of motion.      Cervical back: Normal range of motion and neck supple. No tenderness.      Right lower leg: No edema.      Left lower leg: No edema.   Lymphadenopathy:      Cervical: No cervical adenopathy.   Skin:     General: Skin is warm and dry.   Neurological:      Mental Status: She is alert and oriented to person, place, and time.   Psychiatric:         Mood and Affect: Mood and affect normal.         Behavior: Behavior normal.         Thought Content: Thought content normal.         Judgment: Judgment normal.           Common labs          10/20/2022    10:26 10/20/2022    10:39 2023    09:39 2023    09:52 2023    10:47   Common Labs   Glucose 105   101   101    BUN 17   17   21    Creatinine 0.97   1.05   1.02    Sodium 143   139   139    Potassium 4.3   5.1   4.9    Chloride 107   103   104    Calcium 9.6   10.0   9.7    Albumin 4.40   4.5   4.8    Total Bilirubin 0.8   0.8   0.8    Alkaline Phosphatase 68   72   77    AST (SGOT) 18   16   24    ALT (SGPT) 13   13   17    WBC 6.93   6.46      Hemoglobin 12.5   13.3      Hematocrit 36.8   41.0      Platelets 290   303      Total Cholesterol   146      Triglycerides   76      HDL Cholesterol   69      LDL Cholesterol    62      Hemoglobin A1C   5.80      Microalbumin, Urine  1.4   1.5                 HEALTH RISK ASSESSMENT    Smoking Status:  Social History     Tobacco Use   Smoking Status Former    Packs/day: 0.50    Years: 45.00    Pack years: 22.50    Types: Cigarettes    Start date:     Quit date: 2018    Years since quittin.5   Smokeless Tobacco Never     Alcohol Consumption:  Social History     Substance and Sexual  Activity   Alcohol Use Never     Fall Risk Screen:    ASYA Fall Risk Assessment was completed, and patient is at LOW risk for falls.Assessment completed on:2023    Depression Screenin/24/2023     8:54 AM   PHQ-2/PHQ-9 Depression Screening   Little Interest or Pleasure in Doing Things 0-->not at all   Feeling Down, Depressed or Hopeless 0-->not at all   PHQ-9: Brief Depression Severity Measure Score 0       Health Habits and Functional and Cognitive Screenin/24/2023     8:51 AM   Functional & Cognitive Status   Do you have difficulty preparing food and eating? No   Do you have difficulty bathing yourself, getting dressed or grooming yourself? No   Do you have difficulty using the toilet? No   Do you have difficulty moving around from place to place? No   Do you have trouble with steps or getting out of a bed or a chair? No   Current Diet Well Balanced Diet   Dental Exam Up to date   Eye Exam Up to date   Exercise (times per week) 2 times per week   Current Exercises Include Walking   Do you need help using the phone?  No   Are you deaf or do you have serious difficulty hearing?  No   Do you need help to go to places out of walking distance? No   Do you need help shopping? No   Do you need help preparing meals?  No   Do you need help with housework?  No   Do you need help with laundry? No   Do you need help taking your medications? No   Do you need help managing money? No   Do you ever drive or ride in a car without wearing a seat belt? No   Have you felt unusual stress, anger or loneliness in the last month? No   Who do you live with? Alone   If you need help, do you have trouble finding someone available to you? No   Have you been bothered in the last four weeks by sexual problems? No   Do you have difficulty concentrating, remembering or making decisions? No       Age-appropriate Screening Schedule:  Refer to the list below for future screening recommendations based on patient's age, sex  and/or medical conditions. Orders for these recommended tests are listed in the plan section. The patient has been provided with a written plan.    Health Maintenance   Topic Date Due    DIABETIC EYE EXAM  02/17/2023    COVID-19 Vaccine (5 - Pfizer series) 04/15/2023    HEMOGLOBIN A1C  07/16/2023    LUNG CANCER SCREENING  07/20/2023    INFLUENZA VACCINE  10/01/2023    LIPID PANEL  01/16/2024    URINE MICROALBUMIN  01/16/2024    ANNUAL WELLNESS VISIT  07/24/2024    DXA SCAN  07/07/2025    TDAP/TD VACCINES (2 - Td or Tdap) 01/18/2029    HEPATITIS C SCREENING  Completed    Pneumococcal Vaccine 65+  Completed    ZOSTER VACCINE  Completed     Mammo Screening Digital Tomosynthesis Bilateral With CAD (07/07/2023 10:33)    DEXA Bone Density Axial (07/07/2023 13:11)             CMS Preventative Services Quick Reference  Risk Factors Identified During Encounter:    Immunizations Discussed/Encouraged: COVID19 - booster vaccination  Dental Screening Recommended  Vision Screening Recommended    The above risks/problems have been discussed with the patient.  Pertinent information has been shared with the patient in the After Visit Summary.    Diagnoses and all orders for this visit:    1. Medicare annual wellness visit, subsequent (Primary)    2. Controlled type 2 diabetes mellitus with stage 3 chronic kidney disease, without long-term current use of insulin  -     CBC Auto Differential  -     Comprehensive Metabolic Panel  -     Hemoglobin A1c  -     Lipid Panel  -     TSH+Free T4  -     Microalbumin / Creatinine Urine Ratio - Urine, Clean Catch  -     Discontinue: metFORMIN (GLUCOPHAGE) 500 MG tablet; Take 1 tablet by mouth 2 (Two) Times a Day With Meals.  Dispense: 180 tablet; Refill: 1  -     metFORMIN (GLUCOPHAGE) 500 MG tablet; Take 1 tablet by mouth 2 (Two) Times a Day With Meals.  Dispense: 180 tablet; Refill: 1    3. Mixed hyperlipidemia  -     Comprehensive Metabolic Panel  -     Lipid Panel  -     Discontinue:  atorvastatin (LIPITOR) 20 MG tablet; Take 1 tablet by mouth Every Night.  Dispense: 90 tablet; Refill: 1  -     atorvastatin (LIPITOR) 20 MG tablet; Take 1 tablet by mouth Every Night.  Dispense: 90 tablet; Refill: 1        Follow Up:   Next Medicare Wellness visit to be scheduled in 1 year.      Return in about 6 months (around 1/24/2024) for Next scheduled follow up, medication refills and fasting labs.      An After Visit Summary and PPPS were made available to the patient.

## 2023-07-24 NOTE — PROGRESS NOTES
Venipuncture Blood Specimen Collection  Venipuncture performed in left arm by Michelle Toledo with good hemostasis. Patient tolerated the procedure well without complications.   07/24/23   Michelle Toledo

## 2023-07-28 ENCOUNTER — OFFICE VISIT (OUTPATIENT)
Dept: FAMILY MEDICINE CLINIC | Facility: CLINIC | Age: 78
End: 2023-07-28
Payer: MEDICARE

## 2023-07-28 VITALS
HEART RATE: 103 BPM | WEIGHT: 117 LBS | OXYGEN SATURATION: 98 % | SYSTOLIC BLOOD PRESSURE: 110 MMHG | BODY MASS INDEX: 22.47 KG/M2 | TEMPERATURE: 98 F | DIASTOLIC BLOOD PRESSURE: 70 MMHG

## 2023-07-28 DIAGNOSIS — U07.1 UPPER RESPIRATORY TRACT INFECTION DUE TO COVID-19 VIRUS: Primary | ICD-10-CM

## 2023-07-28 DIAGNOSIS — R51.9 ACUTE INTRACTABLE HEADACHE, UNSPECIFIED HEADACHE TYPE: ICD-10-CM

## 2023-07-28 DIAGNOSIS — R09.81 SINUS CONGESTION: ICD-10-CM

## 2023-07-28 DIAGNOSIS — J06.9 UPPER RESPIRATORY TRACT INFECTION DUE TO COVID-19 VIRUS: Primary | ICD-10-CM

## 2023-07-28 LAB
EXPIRATION DATE: ABNORMAL
FLUAV AG UPPER RESP QL IA.RAPID: NOT DETECTED
FLUBV AG UPPER RESP QL IA.RAPID: NOT DETECTED
INTERNAL CONTROL: ABNORMAL
Lab: ABNORMAL
SARS-COV-2 AG UPPER RESP QL IA.RAPID: DETECTED

## 2023-07-28 PROCEDURE — 1160F RVW MEDS BY RX/DR IN RCRD: CPT | Performed by: NURSE PRACTITIONER

## 2023-07-28 PROCEDURE — 99213 OFFICE O/P EST LOW 20 MIN: CPT | Performed by: NURSE PRACTITIONER

## 2023-07-28 PROCEDURE — 3078F DIAST BP <80 MM HG: CPT | Performed by: NURSE PRACTITIONER

## 2023-07-28 PROCEDURE — 1159F MED LIST DOCD IN RCRD: CPT | Performed by: NURSE PRACTITIONER

## 2023-07-28 PROCEDURE — 3074F SYST BP LT 130 MM HG: CPT | Performed by: NURSE PRACTITIONER

## 2023-07-28 PROCEDURE — 87428 SARSCOV & INF VIR A&B AG IA: CPT | Performed by: NURSE PRACTITIONER

## 2023-07-28 RX ORDER — FLUTICASONE PROPIONATE 50 MCG
2 SPRAY, SUSPENSION (ML) NASAL DAILY
Qty: 16 G | Refills: 0 | Status: SHIPPED | OUTPATIENT
Start: 2023-07-28

## 2023-07-28 RX ORDER — METHYLPREDNISOLONE 4 MG/1
TABLET ORAL
Qty: 1 EACH | Refills: 0 | Status: SHIPPED | OUTPATIENT
Start: 2023-07-28

## 2023-07-28 RX ORDER — LORATADINE 10 MG/1
10 TABLET ORAL DAILY
Qty: 30 TABLET | Refills: 0 | Status: SHIPPED | OUTPATIENT
Start: 2023-07-28

## 2023-07-28 RX ORDER — BENZONATATE 100 MG/1
100 CAPSULE ORAL 3 TIMES DAILY PRN
Qty: 30 CAPSULE | Refills: 0 | Status: SHIPPED | OUTPATIENT
Start: 2023-07-28

## 2023-07-28 NOTE — PROGRESS NOTES
Chief Complaint  Headache (Sinus congestion and drainage )    Subjective            Deanne Mariya Pittman presents to [facility]  Headache    Deanne presents to the office today with 2 to 3-day history of acute onset headache, runny nose and congestion, and sore throat.  She has a congested cough.  She denies any known fever, but she has had chills.  She denies any significant wheeze or shortness of breath.  She has been trying to manage her symptoms with over-the-counter medication but she does not feel like she is improving.  She endorses fatigue.      Past Medical History:   Diagnosis Date    Cardiomyopathy     Hyperlipidemia     Renal cyst     right       Allergies   Allergen Reactions    Penicillins Unknown - Low Severity        Past Surgical History:   Procedure Laterality Date    BREAST SURGERY Left     lump removed    COLON SURGERY      LIVER SURGERY      exploritory surgery due to puncture to liver     SUBTOTAL HYSTERECTOMY          Social History     Tobacco Use    Smoking status: Former     Packs/day: 0.50     Years: 45.00     Pack years: 22.50     Types: Cigarettes     Start date:      Quit date: 2018     Years since quittin.5    Smokeless tobacco: Never   Vaping Use    Vaping Use: Never used   Substance Use Topics    Alcohol use: Never    Drug use: Never       Family History   Problem Relation Age of Onset    Skin cancer Mother         skin carcinoma    Breast cancer Sister     Lupus Sister         lupus erythematosus    Breast cancer Sister     Rectal cancer Sister         Health Maintenance Due   Topic Date Due    DIABETIC EYE EXAM  2023    COVID-19 Vaccine (5 - Pfizer series) 04/15/2023        Current Outpatient Medications on File Prior to Visit   Medication Sig    Accu-Chek Softclix Lancets lancets Check blood glucose once daily fasting and record.    atorvastatin (LIPITOR) 20 MG tablet Take 1 tablet by mouth Every Night.    Blood Glucose Monitoring Suppl  (Accu-Chek Guide) w/Device kit 1 kit Daily. Check blood glucose once daily fasting and record.    carvedilol (COREG) 6.25 MG tablet Take 1 tablet by mouth 2 (two) times a day.    cyanocobalamin (VITAMIN B-12) 1000 MCG tablet Take 1 tablet by mouth.    glucose blood (Accu-Chek Guide) test strip Check blood glucose once daily fasting and record.    losartan (COZAAR) 25 MG tablet Take 1 tablet by mouth Daily.    metFORMIN (GLUCOPHAGE) 500 MG tablet Take 1 tablet by mouth 2 (Two) Times a Day With Meals.     No current facility-administered medications on file prior to visit.       Immunization History   Administered Date(s) Administered    COVID-19 (PFIZER) BIVALENT 12+YRS 12/15/2022    COVID-19 (PFIZER) Purple Cap Monovalent 03/31/2021, 04/21/2021, 12/09/2021    Fluzone High Dose =>65 Years ([employer] ONLY) 11/29/2022    Fluzone High-Dose 65+yrs 10/23/2021, 11/29/2022    Hepatitis A 08/14/2018, 02/26/2019    Influenza, Unspecified 10/22/2020    Pneumococcal Conjugate 13-Valent (PCV13) 12/08/2017    Pneumococcal Polysaccharide (PPSV23) 12/10/2018    Shingrix 08/29/2018, 08/19/2019    Tdap 01/18/2019       Review of Systems     Objective     /70   Pulse 103   Temp 98 °F (36.7 °C)   Wt 53.1 kg (117 lb)   SpO2 98%   BMI 22.47 kg/m²       Physical Exam  Vitals reviewed.   Constitutional:       General: She is not in acute distress.     Appearance: Normal appearance. She is well-developed and normal weight.   HENT:      Head: Normocephalic and atraumatic.      Right Ear: Tympanic membrane, ear canal and external ear normal. There is no impacted cerumen.      Left Ear: Tympanic membrane, ear canal and external ear normal. There is no impacted cerumen.      Nose: Congestion present.      Mouth/Throat:      Mouth: Mucous membranes are moist.      Pharynx: Oropharynx is clear. No oropharyngeal exudate or posterior oropharyngeal erythema.   Eyes:      General: No scleral icterus.     Extraocular Movements: Extraocular  movements intact.      Conjunctiva/sclera: Conjunctivae normal.   Neck:      Trachea: Trachea normal.   Cardiovascular:      Rate and Rhythm: Normal rate and regular rhythm.      Pulses: Normal pulses.      Heart sounds: No murmur heard.  Pulmonary:      Effort: Pulmonary effort is normal. No respiratory distress.      Breath sounds: Normal breath sounds. No wheezing, rhonchi or rales.   Musculoskeletal:         General: Normal range of motion.      Cervical back: Normal range of motion and neck supple. No tenderness.      Right lower leg: No edema.      Left lower leg: No edema.   Lymphadenopathy:      Cervical: No cervical adenopathy.   Skin:     General: Skin is warm and dry.   Neurological:      Mental Status: She is alert and oriented to person, place, and time.   Psychiatric:         Mood and Affect: Mood and affect normal.         Behavior: Behavior normal.         Thought Content: Thought content normal.         Judgment: Judgment normal.       Result Review :     The following data was reviewed by: BETSY Rocha on 07/28/2023:    POCT SARS-CoV-2 Antigen DELFINO + Flu (07/28/2023 09:17)                     Assessment and Plan      Diagnoses and all orders for this visit:    1. Upper respiratory tract infection due to COVID-19 virus (Primary)  -     methylPREDNISolone (MEDROL) 4 MG dose pack; Take as directed on package instructions.  Dispense: 1 each; Refill: 0  -     benzonatate (Tessalon Perles) 100 MG capsule; Take 1 capsule by mouth 3 (Three) Times a Day As Needed for Cough.  Dispense: 30 capsule; Refill: 0    2. Acute intractable headache, unspecified headache type  -     POCT SARS-CoV-2 Antigen DELFINO + Flu    3. Sinus congestion  -     POCT SARS-CoV-2 Antigen DELFINO + Flu  -     loratadine (Claritin) 10 MG tablet; Take 1 tablet by mouth Daily.  Dispense: 30 tablet; Refill: 0  -     fluticasone (FLONASE) 50 MCG/ACT nasal spray; 2 sprays into the nostril(s) as directed by provider Daily.  Dispense: 16  g; Refill: 0            Follow Up     Return if symptoms worsen or fail to improve.    Point-of-care testing positive for COVID-19 and negative for influenza A/B.  I will treat symptoms with Medrol Dosepak, Tessalon Perles, Claritin, and Flonase.  She will follow-up if persistent/worsening symptoms.  If after 7 to 10 days of conservative management she is still experiencing head congestion and drainage we will consider antibiotic at that time.  Advised patient to call the office to discuss.    Patient was given instructions and counseling regarding her condition or for health maintenance advice. Please see specific information pulled into the AVS if appropriate.     BMI is within normal parameters. No other follow-up for BMI required.

## 2023-07-31 ENCOUNTER — TELEPHONE (OUTPATIENT)
Dept: FAMILY MEDICINE CLINIC | Facility: CLINIC | Age: 78
End: 2023-07-31

## 2023-07-31 NOTE — TELEPHONE ENCOUNTER
Caller: Deanne Pittman     Relationship: [unfilled]     Best call back number:[phone]    What is your medical concern? WOULD LIKE A NOTE PUT IN FILE THAT ALL REGULAR MEDICATION NEEDS TO BE SENT TO [facility], ANY MEDICATION THAT IS PRESCRIBED FOR SICKNESS NEEDS TO BE SENT TO SAVE A LOT.

## 2023-08-07 ENCOUNTER — TELEPHONE (OUTPATIENT)
Dept: FAMILY MEDICINE CLINIC | Facility: CLINIC | Age: 78
End: 2023-08-07
Payer: MEDICARE

## 2023-08-07 ENCOUNTER — HOSPITAL ENCOUNTER (OUTPATIENT)
Dept: GENERAL RADIOLOGY | Facility: HOSPITAL | Age: 78
Discharge: HOME | End: 2023-08-07
Admitting: NURSE PRACTITIONER
Payer: MEDICARE

## 2023-08-07 DIAGNOSIS — J18.9 PNEUMONIA OF RIGHT MIDDLE LOBE DUE TO INFECTIOUS ORGANISM: Primary | ICD-10-CM

## 2023-08-07 DIAGNOSIS — R09.89 CHEST CONGESTION: ICD-10-CM

## 2023-08-07 DIAGNOSIS — J06.9 UPPER RESPIRATORY TRACT INFECTION DUE TO COVID-19 VIRUS: ICD-10-CM

## 2023-08-07 DIAGNOSIS — J06.9 UPPER RESPIRATORY TRACT INFECTION DUE TO COVID-19 VIRUS: Primary | ICD-10-CM

## 2023-08-07 DIAGNOSIS — U07.1 UPPER RESPIRATORY TRACT INFECTION DUE TO COVID-19 VIRUS: Primary | ICD-10-CM

## 2023-08-07 DIAGNOSIS — U07.1 UPPER RESPIRATORY TRACT INFECTION DUE TO COVID-19 VIRUS: ICD-10-CM

## 2023-08-07 PROCEDURE — 71046 X-RAY EXAM CHEST 2 VIEWS: CPT

## 2023-08-07 RX ORDER — AZITHROMYCIN 250 MG/1
TABLET, FILM COATED ORAL
Qty: 6 TABLET | Refills: 0 | Status: SHIPPED | OUTPATIENT
Start: 2023-08-07

## 2023-08-07 NOTE — TELEPHONE ENCOUNTER
Patient called and said she is not feeling better, it is going into her chest and she said she was told to call back and let you know if she was no better. Please advise.

## 2023-08-15 ENCOUNTER — OFFICE VISIT (OUTPATIENT)
Dept: FAMILY MEDICINE CLINIC | Facility: CLINIC | Age: 78
End: 2023-08-15
Payer: MEDICARE

## 2023-08-15 VITALS
SYSTOLIC BLOOD PRESSURE: 102 MMHG | HEART RATE: 100 BPM | OXYGEN SATURATION: 97 % | BODY MASS INDEX: 22.86 KG/M2 | WEIGHT: 119 LBS | DIASTOLIC BLOOD PRESSURE: 70 MMHG | TEMPERATURE: 96.6 F

## 2023-08-15 DIAGNOSIS — J06.9 UPPER RESPIRATORY TRACT INFECTION DUE TO COVID-19 VIRUS: ICD-10-CM

## 2023-08-15 DIAGNOSIS — U07.1 UPPER RESPIRATORY TRACT INFECTION DUE TO COVID-19 VIRUS: ICD-10-CM

## 2023-08-15 DIAGNOSIS — J18.9 PNEUMONIA OF RIGHT MIDDLE LOBE DUE TO INFECTIOUS ORGANISM: Primary | ICD-10-CM

## 2023-08-15 PROCEDURE — 1160F RVW MEDS BY RX/DR IN RCRD: CPT | Performed by: NURSE PRACTITIONER

## 2023-08-15 PROCEDURE — 3074F SYST BP LT 130 MM HG: CPT | Performed by: NURSE PRACTITIONER

## 2023-08-15 PROCEDURE — 99213 OFFICE O/P EST LOW 20 MIN: CPT | Performed by: NURSE PRACTITIONER

## 2023-08-15 PROCEDURE — 3078F DIAST BP <80 MM HG: CPT | Performed by: NURSE PRACTITIONER

## 2023-08-15 PROCEDURE — 1159F MED LIST DOCD IN RCRD: CPT | Performed by: NURSE PRACTITIONER

## 2023-08-15 NOTE — PROGRESS NOTES
Chief Complaint  Pneumonia (Follow up )    Subjective            Deanne Mariya Pittman presents to [facility]  History of Present Illness    Deanne presents to the office today to follow-up from recent COVID-19 infection with subsequent pneumonia.  She was diagnosed with COVID-19 on 2023.  At the time she was treated with a steroid pack and cough medication.  Approximately a week later she called the office with concerns for not feeling any better.  She felt like the congestion had went into her chest.  A chest x-ray was ordered and showed right middle lobe pneumonia.  She was treated with azithromycin and advised to follow-up.    She currently denies any fever, chills, or body aches.  No headaches, sore throat, ear pain, runny nose or congestion.  She denies any chest pain or palpitations.  Denies shortness of breath or wheezing.  Sometimes she does feel like she has some chest heaviness or tightness, but she does not have a productive cough.  She feels like those symptoms are worse in the morning after she is laying down at night.  Throughout the day she is seemingly fine.  She feels much better than she did before.    Past Medical History:   Diagnosis Date    Cardiomyopathy     Hyperlipidemia     Renal cyst     right       Allergies   Allergen Reactions    Penicillins Unknown - Low Severity        Past Surgical History:   Procedure Laterality Date    BREAST SURGERY Left     lump removed    COLON SURGERY      LIVER SURGERY      exploritory surgery due to puncture to liver     SUBTOTAL HYSTERECTOMY          Social History     Tobacco Use    Smoking status: Former     Packs/day: 0.50     Years: 45.00     Pack years: 22.50     Types: Cigarettes     Start date:      Quit date: 2018     Years since quittin.6    Smokeless tobacco: Never   Vaping Use    Vaping Use: Never used   Substance Use Topics    Alcohol use: Never    Drug use: Never       Family History   Problem Relation  Age of Onset    Skin cancer Mother         skin carcinoma    Breast cancer Sister     Lupus Sister         lupus erythematosus    Breast cancer Sister     Rectal cancer Sister         Health Maintenance Due   Topic Date Due    DIABETIC EYE EXAM  02/17/2023    COVID-19 Vaccine (5 - Pfizer series) 04/15/2023        Current Outpatient Medications on File Prior to Visit   Medication Sig    Accu-Chek Softclix Lancets lancets Check blood glucose once daily fasting and record.    atorvastatin (LIPITOR) 20 MG tablet Take 1 tablet by mouth Every Night.    azithromycin (Zithromax Z-Isiah) 250 MG tablet Take 2 tablets by mouth on day 1, then 1 tablet daily on days 2-5    benzonatate (Tessalon Perles) 100 MG capsule Take 1 capsule by mouth 3 (Three) Times a Day As Needed for Cough.    Blood Glucose Monitoring Suppl (Accu-Chek Guide) w/Device kit 1 kit Daily. Check blood glucose once daily fasting and record.    carvedilol (COREG) 6.25 MG tablet Take 1 tablet by mouth 2 (two) times a day.    cyanocobalamin (VITAMIN B-12) 1000 MCG tablet Take 1 tablet by mouth.    fluticasone (FLONASE) 50 MCG/ACT nasal spray 2 sprays into the nostril(s) as directed by provider Daily.    glucose blood (Accu-Chek Guide) test strip Check blood glucose once daily fasting and record.    loratadine (Claritin) 10 MG tablet Take 1 tablet by mouth Daily.    losartan (COZAAR) 25 MG tablet Take 1 tablet by mouth Daily.    metFORMIN (GLUCOPHAGE) 500 MG tablet Take 1 tablet by mouth 2 (Two) Times a Day With Meals.    methylPREDNISolone (MEDROL) 4 MG dose pack Take as directed on package instructions.     No current facility-administered medications on file prior to visit.       Immunization History   Administered Date(s) Administered    COVID-19 (PFIZER) BIVALENT 12+YRS 12/15/2022    COVID-19 (PFIZER) Purple Cap Monovalent 03/31/2021, 04/21/2021, 12/09/2021    Fluzone High Dose =>65 Years ([employer] ONLY) 11/29/2022    Fluzone High-Dose 65+yrs 10/23/2021,  11/29/2022    Hepatitis A 08/14/2018, 02/26/2019    Influenza, Unspecified 10/22/2020    Pneumococcal Conjugate 13-Valent (PCV13) 12/08/2017    Pneumococcal Polysaccharide (PPSV23) 12/10/2018    Shingrix 08/29/2018, 08/19/2019    Tdap 01/18/2019       Review of Systems     Objective     /70   Pulse 100   Temp 96.6 øF (35.9 øC)   Wt 54 kg (119 lb)   SpO2 97%   BMI 22.86 kg/mý       Physical Exam  Vitals reviewed.   Constitutional:       General: She is not in acute distress.     Appearance: Normal appearance. She is well-developed and normal weight.   HENT:      Head: Normocephalic and atraumatic.   Eyes:      General: No scleral icterus.        Right eye: No discharge.         Left eye: No discharge.      Extraocular Movements: Extraocular movements intact.      Conjunctiva/sclera: Conjunctivae normal.   Neck:      Trachea: Trachea normal.   Cardiovascular:      Rate and Rhythm: Normal rate and regular rhythm.      Pulses: Normal pulses.      Heart sounds: No murmur heard.  Pulmonary:      Effort: Pulmonary effort is normal. No respiratory distress.      Breath sounds: Normal breath sounds. No wheezing, rhonchi or rales.   Musculoskeletal:         General: Normal range of motion.      Cervical back: Normal range of motion. No tenderness.   Lymphadenopathy:      Cervical: No cervical adenopathy.   Skin:     General: Skin is warm and dry.   Neurological:      Mental Status: She is alert and oriented to person, place, and time.   Psychiatric:         Mood and Affect: Mood and affect normal.         Behavior: Behavior normal.         Thought Content: Thought content normal.         Judgment: Judgment normal.       Result Review :     The following data was reviewed by: BETSY Rocha on 08/15/2023:      POCT SARS-CoV-2 Antigen DELFINO + Flu (07/28/2023 09:43)     Data reviewed : Radiologic studies :    XR Chest 2 View (08/07/2023 09:25)  XR Chest PA & Lateral (In Office) (08/15/2023 11:11)            Assessment and Plan      Diagnoses and all orders for this visit:    1. Pneumonia of right middle lobe due to infectious organism (Primary)  -     XR Chest PA & Lateral (In Office)  -     XR Chest PA & Lateral; Future    2. Upper respiratory tract infection due to COVID-19 virus  -     XR Chest PA & Lateral (In Office)  -     XR Chest PA & Lateral; Future            Follow Up     No follow-ups on file.    Her chest x-ray in the office today shows improving right middle lobe opacity, likely resolving inflammation or infection.  Labs otherwise are normal.  Her symptoms are improved since she took the azithromycin.  Overall she feels better today, and does feel like she is back at baseline aside from some residual chest congestion.  I will get a repeat x-ray in 3 weeks to ensure resolution, but if she has any rebound symptoms she will follow-up in the office with me sooner.    Patient was given instructions and counseling regarding her condition or for health maintenance advice. Please see specific information pulled into the AVS if appropriate.     BMI is within normal parameters. No other follow-up for BMI required.

## 2023-09-07 ENCOUNTER — HOSPITAL ENCOUNTER (OUTPATIENT)
Dept: GENERAL RADIOLOGY | Facility: HOSPITAL | Age: 78
Discharge: HOME | End: 2023-09-07
Admitting: NURSE PRACTITIONER
Payer: MEDICARE

## 2023-09-07 DIAGNOSIS — J18.9 PNEUMONIA OF RIGHT MIDDLE LOBE DUE TO INFECTIOUS ORGANISM: ICD-10-CM

## 2023-09-07 DIAGNOSIS — J06.9 UPPER RESPIRATORY TRACT INFECTION DUE TO COVID-19 VIRUS: ICD-10-CM

## 2023-09-07 DIAGNOSIS — U07.1 UPPER RESPIRATORY TRACT INFECTION DUE TO COVID-19 VIRUS: ICD-10-CM

## 2023-09-07 PROCEDURE — 71046 X-RAY EXAM CHEST 2 VIEWS: CPT

## 2023-10-18 ENCOUNTER — TRANSCRIBE ORDERS (OUTPATIENT)
Dept: ADMINISTRATIVE | Facility: HOSPITAL | Age: 78
End: 2023-10-18
Payer: MEDICARE

## 2023-10-18 ENCOUNTER — LAB (OUTPATIENT)
Dept: LAB | Facility: HOSPITAL | Age: 78
End: 2023-10-18
Payer: MEDICARE

## 2023-10-18 DIAGNOSIS — N18.30 STAGE 3 CHRONIC KIDNEY DISEASE, UNSPECIFIED WHETHER STAGE 3A OR 3B CKD: Primary | ICD-10-CM

## 2023-10-18 DIAGNOSIS — N18.30 STAGE 3 CHRONIC KIDNEY DISEASE, UNSPECIFIED WHETHER STAGE 3A OR 3B CKD: ICD-10-CM

## 2023-10-18 DIAGNOSIS — E11.9 DIABETES MELLITUS WITHOUT COMPLICATION: ICD-10-CM

## 2023-10-18 LAB
25(OH)D3 SERPL-MCNC: 47.8 NG/ML (ref 30–100)
ALBUMIN SERPL-MCNC: 4.6 G/DL (ref 3.5–5.2)
ALBUMIN UR-MCNC: <1.2 MG/DL
ALBUMIN/GLOB SERPL: 1.9 G/DL
ALP SERPL-CCNC: 77 U/L (ref 39–117)
ALT SERPL W P-5'-P-CCNC: 15 U/L (ref 1–33)
ANION GAP SERPL CALCULATED.3IONS-SCNC: 12.9 MMOL/L (ref 5–15)
AST SERPL-CCNC: 20 U/L (ref 1–32)
BACTERIA UR QL AUTO: ABNORMAL /HPF
BASOPHILS # BLD AUTO: 0.07 10*3/MM3 (ref 0–0.2)
BASOPHILS NFR BLD AUTO: 1 % (ref 0–1.5)
BILIRUB SERPL-MCNC: 0.8 MG/DL (ref 0–1.2)
BILIRUB UR QL STRIP: NEGATIVE
BUN SERPL-MCNC: 23 MG/DL (ref 8–23)
BUN/CREAT SERPL: 19.5 (ref 7–25)
CALCIUM SPEC-SCNC: 10 MG/DL (ref 8.6–10.5)
CHLORIDE SERPL-SCNC: 103 MMOL/L (ref 98–107)
CLARITY UR: CLEAR
CO2 SERPL-SCNC: 24.1 MMOL/L (ref 22–29)
COLOR UR: YELLOW
CREAT SERPL-MCNC: 1.18 MG/DL (ref 0.57–1)
CREAT UR-MCNC: 35.6 MG/DL
DEPRECATED RDW RBC AUTO: 41.8 FL (ref 37–54)
EGFRCR SERPLBLD CKD-EPI 2021: 47.7 ML/MIN/1.73
EOSINOPHIL # BLD AUTO: 0.13 10*3/MM3 (ref 0–0.4)
EOSINOPHIL NFR BLD AUTO: 1.9 % (ref 0.3–6.2)
ERYTHROCYTE [DISTWIDTH] IN BLOOD BY AUTOMATED COUNT: 12.4 % (ref 12.3–15.4)
GLOBULIN UR ELPH-MCNC: 2.4 GM/DL
GLUCOSE SERPL-MCNC: 102 MG/DL (ref 65–99)
GLUCOSE UR STRIP-MCNC: NEGATIVE MG/DL
HBA1C MFR BLD: 5.7 % (ref 4.8–5.6)
HCT VFR BLD AUTO: 42.3 % (ref 34–46.6)
HGB BLD-MCNC: 14 G/DL (ref 12–15.9)
HGB UR QL STRIP.AUTO: NEGATIVE
HYALINE CASTS UR QL AUTO: ABNORMAL /LPF
IMM GRANULOCYTES # BLD AUTO: 0.03 10*3/MM3 (ref 0–0.05)
IMM GRANULOCYTES NFR BLD AUTO: 0.4 % (ref 0–0.5)
KETONES UR QL STRIP: NEGATIVE
LEUKOCYTE ESTERASE UR QL STRIP.AUTO: ABNORMAL
LYMPHOCYTES # BLD AUTO: 1.74 10*3/MM3 (ref 0.7–3.1)
LYMPHOCYTES NFR BLD AUTO: 25.9 % (ref 19.6–45.3)
MCH RBC QN AUTO: 30.6 PG (ref 26.6–33)
MCHC RBC AUTO-ENTMCNC: 33.1 G/DL (ref 31.5–35.7)
MCV RBC AUTO: 92.6 FL (ref 79–97)
MONOCYTES # BLD AUTO: 0.62 10*3/MM3 (ref 0.1–0.9)
MONOCYTES NFR BLD AUTO: 9.2 % (ref 5–12)
NEUTROPHILS NFR BLD AUTO: 4.14 10*3/MM3 (ref 1.7–7)
NEUTROPHILS NFR BLD AUTO: 61.6 % (ref 42.7–76)
NITRITE UR QL STRIP: NEGATIVE
NRBC BLD AUTO-RTO: 0 /100 WBC (ref 0–0.2)
PH UR STRIP.AUTO: 6 [PH] (ref 5–8)
PLATELET # BLD AUTO: 312 10*3/MM3 (ref 140–450)
PMV BLD AUTO: 11.3 FL (ref 6–12)
POTASSIUM SERPL-SCNC: 4.2 MMOL/L (ref 3.5–5.2)
PROT ?TM UR-MCNC: 4.1 MG/DL
PROT SERPL-MCNC: 7 G/DL (ref 6–8.5)
PROT UR QL STRIP: NEGATIVE
PROT/CREAT UR: 0.12 MG/G{CREAT}
RBC # BLD AUTO: 4.57 10*6/MM3 (ref 3.77–5.28)
RBC # UR STRIP: ABNORMAL /HPF
REF LAB TEST METHOD: ABNORMAL
SODIUM SERPL-SCNC: 140 MMOL/L (ref 136–145)
SP GR UR STRIP: 1.01 (ref 1–1.03)
SQUAMOUS #/AREA URNS HPF: ABNORMAL /HPF
UROBILINOGEN UR QL STRIP: ABNORMAL
WBC # UR STRIP: ABNORMAL /HPF
WBC NRBC COR # BLD: 6.73 10*3/MM3 (ref 3.4–10.8)

## 2023-10-18 PROCEDURE — 83036 HEMOGLOBIN GLYCOSYLATED A1C: CPT

## 2023-10-18 PROCEDURE — 84156 ASSAY OF PROTEIN URINE: CPT

## 2023-10-18 PROCEDURE — 82043 UR ALBUMIN QUANTITATIVE: CPT

## 2023-10-18 PROCEDURE — 36415 COLL VENOUS BLD VENIPUNCTURE: CPT

## 2023-10-18 PROCEDURE — 82306 VITAMIN D 25 HYDROXY: CPT

## 2023-10-18 PROCEDURE — 81001 URINALYSIS AUTO W/SCOPE: CPT

## 2023-10-18 PROCEDURE — 85025 COMPLETE CBC W/AUTO DIFF WBC: CPT

## 2023-10-18 PROCEDURE — 82570 ASSAY OF URINE CREATININE: CPT

## 2023-10-18 PROCEDURE — 80053 COMPREHEN METABOLIC PANEL: CPT

## 2024-05-16 ENCOUNTER — OFFICE VISIT (OUTPATIENT)
Dept: FAMILY MEDICINE CLINIC | Facility: CLINIC | Age: 79
End: 2024-05-16
Payer: MEDICARE

## 2024-05-16 VITALS
HEIGHT: 61 IN | OXYGEN SATURATION: 99 % | DIASTOLIC BLOOD PRESSURE: 74 MMHG | WEIGHT: 125 LBS | HEART RATE: 69 BPM | BODY MASS INDEX: 23.6 KG/M2 | SYSTOLIC BLOOD PRESSURE: 128 MMHG

## 2024-05-16 DIAGNOSIS — Z12.31 BREAST CANCER SCREENING BY MAMMOGRAM: ICD-10-CM

## 2024-05-16 DIAGNOSIS — Z87.891 FORMER SMOKER: ICD-10-CM

## 2024-05-16 DIAGNOSIS — I87.2 CHRONIC VENOUS INSUFFICIENCY: ICD-10-CM

## 2024-05-16 DIAGNOSIS — E11.22 CONTROLLED TYPE 2 DIABETES MELLITUS WITH STAGE 3 CHRONIC KIDNEY DISEASE, WITHOUT LONG-TERM CURRENT USE OF INSULIN: Primary | ICD-10-CM

## 2024-05-16 DIAGNOSIS — I10 ESSENTIAL (PRIMARY) HYPERTENSION: ICD-10-CM

## 2024-05-16 DIAGNOSIS — E78.2 MIXED HYPERLIPIDEMIA: ICD-10-CM

## 2024-05-16 DIAGNOSIS — Z12.2 SCREENING FOR LUNG CANCER: ICD-10-CM

## 2024-05-16 DIAGNOSIS — N18.30 CONTROLLED TYPE 2 DIABETES MELLITUS WITH STAGE 3 CHRONIC KIDNEY DISEASE, WITHOUT LONG-TERM CURRENT USE OF INSULIN: Primary | ICD-10-CM

## 2024-05-16 PROCEDURE — 3074F SYST BP LT 130 MM HG: CPT | Performed by: NURSE PRACTITIONER

## 2024-05-16 PROCEDURE — G2211 COMPLEX E/M VISIT ADD ON: HCPCS | Performed by: NURSE PRACTITIONER

## 2024-05-16 PROCEDURE — 1159F MED LIST DOCD IN RCRD: CPT | Performed by: NURSE PRACTITIONER

## 2024-05-16 PROCEDURE — 1160F RVW MEDS BY RX/DR IN RCRD: CPT | Performed by: NURSE PRACTITIONER

## 2024-05-16 PROCEDURE — 1126F AMNT PAIN NOTED NONE PRSNT: CPT | Performed by: NURSE PRACTITIONER

## 2024-05-16 PROCEDURE — 99214 OFFICE O/P EST MOD 30 MIN: CPT | Performed by: NURSE PRACTITIONER

## 2024-05-16 PROCEDURE — 3078F DIAST BP <80 MM HG: CPT | Performed by: NURSE PRACTITIONER

## 2024-05-16 RX ORDER — BLOOD-GLUCOSE METER
1 EACH MISCELLANEOUS DAILY
Qty: 1 KIT | Refills: 0 | Status: CANCELLED | OUTPATIENT
Start: 2024-05-16

## 2024-05-16 RX ORDER — BLOOD-GLUCOSE METER
KIT MISCELLANEOUS
Qty: 1 EACH | Refills: 0 | Status: SHIPPED | OUTPATIENT
Start: 2024-05-16

## 2024-05-16 RX ORDER — LANCETS
EACH MISCELLANEOUS
Qty: 100 EACH | Refills: 3 | Status: CANCELLED | OUTPATIENT
Start: 2024-05-16

## 2024-05-16 RX ORDER — ATORVASTATIN CALCIUM 20 MG/1
20 TABLET, FILM COATED ORAL NIGHTLY
Qty: 90 TABLET | Refills: 1 | Status: SHIPPED | OUTPATIENT
Start: 2024-05-16

## 2024-05-16 RX ORDER — LANCETS 28 GAUGE
EACH MISCELLANEOUS
Qty: 100 EACH | Refills: 3 | Status: SHIPPED | OUTPATIENT
Start: 2024-05-16

## 2024-05-16 RX ORDER — FLUTICASONE PROPIONATE 50 MCG
2 SPRAY, SUSPENSION (ML) NASAL DAILY
Qty: 16 G | Refills: 0 | Status: CANCELLED | OUTPATIENT
Start: 2024-05-16

## 2024-05-16 NOTE — PROGRESS NOTES
"Chief Complaint  Diabetes, Hyperlipidemia, and order for mammogram and for low dose chest CT     History of Present Illness  Deanne Pittman is a 78 y.o. female who presents to [facility] with a past medical history of    Past Medical History:   Diagnosis Date    Cardiomyopathy     Hyperlipidemia     Renal cyst     right     Deanne presents to the office today for follow up - she was briefly going to [facility] due to issues with [employer].     She had labs in January - hemoglobin A1c was excellent at 5.8%, CBC was normal, lipids excellent with HDL 72.3. CMP showed eGFR stable at 52.91, glucose 112, total bilirubin 1.3.     When she was at [facility] they prescribed Farxiga 10mg - she states the entire time she took it she had a headache, so she just stopped taking it. She does not want to restart it as she feels like if she really needed it she would have been given it by nephrology - as the provider at [facility] advised that it would be beneficial for her kidneys. She is back to taking metformin 500mg BID.     She is still taking atorvastatin 20mg nightly - no cramping with use.     She is no longer seeing Dr. Correa - she is not seeing any cardiologist at this time - she does not feel that she needs to at this time. Blood pressure is currently controlled with Coreg 6.25mg BID and Losartan 25mg daily - nephrology now prescribing. Blood pressure is normotensive on exam - 128/74. She denies any chest pain, palpitations, headaches, dizziness, or shortness of breath. Her heart was racing at the time of initial referral - she has had two stress tests and echocardiograms and nothing has come out of any of the testing.     Objective   Vital Signs:   Vitals:    05/16/24 0953   BP: 128/74   Pulse: 69   SpO2: 99%   Weight: 56.7 kg (125 lb)   Height: 154.9 cm (61\")     Body mass index is 23.62 kg/m².    Wt Readings from Last 3 Encounters:   05/16/24 56.7 kg (125 lb)   08/15/23 54 kg (119 lb) "   07/28/23 53.1 kg (117 lb)     BP Readings from Last 3 Encounters:   05/16/24 128/74   08/15/23 102/70   07/28/23 110/70       Health Maintenance   Topic Date Due    RSV Vaccine - Adults (1 - 1-dose 60+ series) Never done    DIABETIC EYE EXAM  02/17/2023    COVID-19 Vaccine (5 - 2023-24 season) 09/01/2023    HEMOGLOBIN A1C  04/18/2024    ANNUAL WELLNESS VISIT  07/24/2024    LUNG CANCER SCREENING  07/24/2024    LIPID PANEL  07/24/2024    INFLUENZA VACCINE  08/01/2024    URINE MICROALBUMIN  10/18/2024    DXA SCAN  07/07/2025    TDAP/TD VACCINES (2 - Td or Tdap) 01/18/2029    HEPATITIS C SCREENING  Completed    Pneumococcal Vaccine 65+  Completed    ZOSTER VACCINE  Completed       Physical Exam  Vitals reviewed.   Constitutional:       General: She is not in acute distress.     Appearance: Normal appearance. She is well-developed and normal weight.   HENT:      Head: Normocephalic and atraumatic.   Eyes:      General: No scleral icterus.        Right eye: No discharge.         Left eye: No discharge.      Extraocular Movements: Extraocular movements intact.      Conjunctiva/sclera: Conjunctivae normal.   Neck:      Vascular: No carotid bruit.      Trachea: Trachea normal.   Cardiovascular:      Rate and Rhythm: Normal rate and regular rhythm.      Pulses: Normal pulses.      Heart sounds: No murmur heard.  Pulmonary:      Effort: Pulmonary effort is normal.      Breath sounds: Normal breath sounds.   Musculoskeletal:         General: Normal range of motion.      Cervical back: Normal range of motion and neck supple. No tenderness.      Right lower leg: No edema.      Left lower leg: No edema.   Lymphadenopathy:      Cervical: No cervical adenopathy.   Skin:     General: Skin is warm and dry.   Neurological:      Mental Status: She is alert and oriented to person, place, and time.   Psychiatric:         Mood and Affect: Mood and affect normal.         Behavior: Behavior normal.         Thought Content: Thought content  normal.         Judgment: Judgment normal.         Result Review :  The following data was reviewed by: BETSY Rocha on 05/16/2024:    No visits with results within 1 Month(s) from this visit.   Latest known visit with results is:   Lab on 10/18/2023   Component Date Value    Glucose 10/18/2023 102 (H)     BUN 10/18/2023 23     Creatinine 10/18/2023 1.18 (H)     Sodium 10/18/2023 140     Potassium 10/18/2023 4.2     Chloride 10/18/2023 103     CO2 10/18/2023 24.1     Calcium 10/18/2023 10.0     Total Protein 10/18/2023 7.0     Albumin 10/18/2023 4.6     ALT (SGPT) 10/18/2023 15     AST (SGOT) 10/18/2023 20     Alkaline Phosphatase 10/18/2023 77     Total Bilirubin 10/18/2023 0.8     Globulin 10/18/2023 2.4     A/G Ratio 10/18/2023 1.9     BUN/Creatinine Ratio 10/18/2023 19.5     Anion Gap 10/18/2023 12.9     eGFR 10/18/2023 47.7 (L)     Hemoglobin A1C 10/18/2023 5.70 (H)     25 Hydroxy, Vitamin D 10/18/2023 47.8     WBC 10/18/2023 6.73     RBC 10/18/2023 4.57     Hemoglobin 10/18/2023 14.0     Hematocrit 10/18/2023 42.3     MCV 10/18/2023 92.6     MCH 10/18/2023 30.6     MCHC 10/18/2023 33.1     RDW 10/18/2023 12.4     RDW-SD 10/18/2023 41.8     MPV 10/18/2023 11.3     Platelets 10/18/2023 312     Neutrophil % 10/18/2023 61.6     Lymphocyte % 10/18/2023 25.9     Monocyte % 10/18/2023 9.2     Eosinophil % 10/18/2023 1.9     Basophil % 10/18/2023 1.0     Immature Grans % 10/18/2023 0.4     Neutrophils, Absolute 10/18/2023 4.14     Lymphocytes, Absolute 10/18/2023 1.74     Monocytes, Absolute 10/18/2023 0.62     Eosinophils, Absolute 10/18/2023 0.13     Basophils, Absolute 10/18/2023 0.07     Immature Grans, Absolute 10/18/2023 0.03     nRBC 10/18/2023 0.0     Color, UA 10/18/2023 Yellow     Appearance, UA 10/18/2023 Clear     pH, UA 10/18/2023 6.0     Specific Gravity, UA 10/18/2023 1.009     Glucose, UA 10/18/2023 Negative     Ketones, UA 10/18/2023 Negative     Bilirubin, UA 10/18/2023 Negative      Blood, UA 10/18/2023 Negative     Protein, UA 10/18/2023 Negative     Leuk Esterase, UA 10/18/2023 Moderate (2+) (A)     Nitrite, UA 10/18/2023 Negative     Urobilinogen, UA 10/18/2023 0.2 E.U./dL     Microalbumin, Urine 10/18/2023 <1.2     Creatinine, Urine 10/18/2023 35.6     Total Protein, Urine 10/18/2023 4.1     Protein/Creatinine Ratio* 10/18/2023 0.12     RBC, UA 10/18/2023 0-2     WBC, UA 10/18/2023 3-5 (A)     Bacteria, UA 10/18/2023 None Seen     Squamous Epithelial Cell* 10/18/2023 0-2     Hyaline Casts, UA 10/18/2023 None Seen     Methodology 10/18/2023 Automated Microscopy      CBC, CMP, Hgb A1c, Lipids from outside labs dated 01/23/2024.     Mammo Screening Digital Tomosynthesis Bilateral With CAD (07/07/2023 10:33)  DEXA Bone Density Axial (07/07/2023 13:11)  CT Chest Low Dose Cancer Screening WO (07/24/2023 10:37)    Adult Transthoracic Echo Complete W/ Cont if Necessary Per Protocol (11/25/2019 19:34)     10/26/2023 Office Visit - Fidelina Avila    Procedures       Assessment and Plan   Diagnoses and all orders for this visit:    1. Controlled type 2 diabetes mellitus with stage 3 chronic kidney disease, without long-term current use of insulin (Primary)  -     CBC Auto Differential; Future  -     Comprehensive Metabolic Panel; Future  -     Hemoglobin A1c; Future  -     Lipid Panel; Future  -     TSH+Free T4; Future  -     Microalbumin / Creatinine Urine Ratio - Urine, Clean Catch; Future  -     glucose blood test strip; Check blood sugar daily fasting and record.  Dispense: 100 each; Refill: 3  -     glucose monitor monitoring kit; Check blood sugar fasting daily and record.  Dispense: 1 each; Refill: 0  -     Lancets Ultra Fine misc; Check blood sugar fasting daily and record.  Dispense: 100 each; Refill: 3  -     metFORMIN (GLUCOPHAGE) 500 MG tablet; Take 1 tablet by mouth 2 (Two) Times a Day With Meals.  Dispense: 180 tablet; Refill: 1    2. Essential (primary) hypertension  -     CBC Auto  Differential; Future  -     Comprehensive Metabolic Panel; Future  -     Lipid Panel; Future  -     TSH+Free T4; Future  -     Microalbumin / Creatinine Urine Ratio - Urine, Clean Catch; Future    3. Chronic venous insufficiency    4. Mixed hyperlipidemia  -     atorvastatin (LIPITOR) 20 MG tablet; Take 1 tablet by mouth Every Night.  Dispense: 90 tablet; Refill: 1  -     Comprehensive Metabolic Panel; Future  -     Lipid Panel; Future    5. Breast cancer screening by mammogram  -     Mammo Screening Digital Tomosynthesis Bilateral With CAD; Future    6. Screening for lung cancer  -      CT Chest Low Dose Cancer Screening WO; Future    7. Former smoker  -      CT Chest Low Dose Cancer Screening WO; Future        BMI is within normal parameters. No other follow-up for BMI required.         FOLLOW UP  Return in about 2 months (around 7/24/2024) for Medicare Wellness, medication refills and fasting labs.    Overall, Deanne has been doing very well since I last saw her in the fall, prior to her insurance no longer being accepted at the office.  I have her most recent labs from January, and everything looks great.  We will plan for fasting her next appointment in July.  An order has been placed if she would like to get this 1 to 2 days prior to her appointment.  I will go ahead and send refills to the pharmacy so there is no lapse in her medication.  Order for mammogram and CT placed today as that will also be due in July.  She will call me with any issues or concerns between now and her July appointment.    Patient was given instructions and counseling regarding her condition or for health maintenance advice. Please see specific information pulled into the AVS if appropriate.       Huyen Damon, APRN  05/16/24  10:33 EDT    CURRENT & DISCONTINUED MEDICATIONS  Current Outpatient Medications   Medication Instructions    atorvastatin (LIPITOR) 20 mg, Oral, Nightly    carvedilol (COREG) 6.25 MG tablet 1 tablet, Oral, 2  times daily    cyanocobalamin (VITAMIN B-12) 1,000 mcg, Oral    fluticasone (FLONASE) 50 MCG/ACT nasal spray 2 sprays, Nasal, Daily    glucose blood test strip Check blood sugar daily fasting and record.    glucose monitor monitoring kit Check blood sugar fasting daily and record.    Lancets Ultra Fine misc Check blood sugar fasting daily and record.    losartan (COZAAR) 25 MG tablet 1 tablet, Oral, Daily    metFORMIN (GLUCOPHAGE) 500 mg, Oral, 2 Times Daily With Meals       Medications Discontinued During This Encounter   Medication Reason    methylPREDNISolone (MEDROL) 4 MG dose pack *Therapy completed    benzonatate (Tessalon Perles) 100 MG capsule *Therapy completed    loratadine (Claritin) 10 MG tablet *Therapy completed    Accu-Chek Softclix Lancets lancets Formulary change    Blood Glucose Monitoring Suppl (Accu-Chek Guide) w/Device kit Formulary change    glucose blood (Accu-Chek Guide) test strip Formulary change    atorvastatin (LIPITOR) 20 MG tablet Reorder    metFORMIN (GLUCOPHAGE) 500 MG tablet Reorder

## 2024-06-19 ENCOUNTER — TRANSCRIBE ORDERS (OUTPATIENT)
Dept: ADMINISTRATIVE | Facility: HOSPITAL | Age: 79
End: 2024-06-19
Payer: MEDICARE

## 2024-06-19 ENCOUNTER — LAB (OUTPATIENT)
Dept: LAB | Facility: HOSPITAL | Age: 79
End: 2024-06-19
Payer: MEDICARE

## 2024-06-19 DIAGNOSIS — E55.9 AVITAMINOSIS D: ICD-10-CM

## 2024-06-19 DIAGNOSIS — E11.9 DIABETES MELLITUS WITHOUT COMPLICATION: ICD-10-CM

## 2024-06-19 DIAGNOSIS — N18.30 STAGE 3 CHRONIC KIDNEY DISEASE, UNSPECIFIED WHETHER STAGE 3A OR 3B CKD: ICD-10-CM

## 2024-06-19 DIAGNOSIS — E11.22 CONTROLLED TYPE 2 DIABETES MELLITUS WITH STAGE 3 CHRONIC KIDNEY DISEASE, WITHOUT LONG-TERM CURRENT USE OF INSULIN: ICD-10-CM

## 2024-06-19 DIAGNOSIS — I10 ESSENTIAL HYPERTENSION, MALIGNANT: ICD-10-CM

## 2024-06-19 DIAGNOSIS — I10 ESSENTIAL (PRIMARY) HYPERTENSION: ICD-10-CM

## 2024-06-19 DIAGNOSIS — N18.30 STAGE 3 CHRONIC KIDNEY DISEASE, UNSPECIFIED WHETHER STAGE 3A OR 3B CKD: Primary | ICD-10-CM

## 2024-06-19 DIAGNOSIS — E78.2 MIXED HYPERLIPIDEMIA: ICD-10-CM

## 2024-06-19 DIAGNOSIS — N18.30 CONTROLLED TYPE 2 DIABETES MELLITUS WITH STAGE 3 CHRONIC KIDNEY DISEASE, WITHOUT LONG-TERM CURRENT USE OF INSULIN: ICD-10-CM

## 2024-06-19 LAB
25(OH)D3 SERPL-MCNC: 34.5 NG/ML (ref 30–100)
ALBUMIN SERPL-MCNC: 4.5 G/DL (ref 3.5–5.2)
ALBUMIN UR-MCNC: <1.2 MG/DL
ALBUMIN UR-MCNC: <1.2 MG/DL
ALBUMIN/GLOB SERPL: 2.8 G/DL
ALP SERPL-CCNC: 84 U/L (ref 39–117)
ALT SERPL W P-5'-P-CCNC: 16 U/L (ref 1–33)
ANION GAP SERPL CALCULATED.3IONS-SCNC: 12 MMOL/L (ref 5–15)
AST SERPL-CCNC: 16 U/L (ref 1–32)
BACTERIA UR QL AUTO: ABNORMAL /HPF
BASOPHILS # BLD AUTO: 0.08 10*3/MM3 (ref 0–0.2)
BASOPHILS NFR BLD AUTO: 1 % (ref 0–1.5)
BILIRUB SERPL-MCNC: 0.7 MG/DL (ref 0–1.2)
BILIRUB UR QL STRIP: NEGATIVE
BUN SERPL-MCNC: 24 MG/DL (ref 8–23)
BUN/CREAT SERPL: 23.1 (ref 7–25)
CALCIUM SPEC-SCNC: 9.3 MG/DL (ref 8.6–10.5)
CHLORIDE SERPL-SCNC: 101 MMOL/L (ref 98–107)
CHOLEST SERPL-MCNC: 138 MG/DL (ref 0–200)
CLARITY UR: CLEAR
CO2 SERPL-SCNC: 24 MMOL/L (ref 22–29)
COLOR UR: YELLOW
CREAT SERPL-MCNC: 1.04 MG/DL (ref 0.57–1)
CREAT UR-MCNC: 101.1 MG/DL
CREAT UR-MCNC: 97.3 MG/DL
CREAT UR-MCNC: 99.7 MG/DL
DEPRECATED RDW RBC AUTO: 40.7 FL (ref 37–54)
EGFRCR SERPLBLD CKD-EPI 2021: 55.5 ML/MIN/1.73
EOSINOPHIL # BLD AUTO: 0.23 10*3/MM3 (ref 0–0.4)
EOSINOPHIL NFR BLD AUTO: 2.9 % (ref 0.3–6.2)
ERYTHROCYTE [DISTWIDTH] IN BLOOD BY AUTOMATED COUNT: 11.9 % (ref 12.3–15.4)
GLOBULIN UR ELPH-MCNC: 1.6 GM/DL
GLUCOSE SERPL-MCNC: 83 MG/DL (ref 65–99)
GLUCOSE UR STRIP-MCNC: NEGATIVE MG/DL
HBA1C MFR BLD: 6.1 % (ref 4.8–5.6)
HCT VFR BLD AUTO: 39.9 % (ref 34–46.6)
HDLC SERPL-MCNC: 72 MG/DL (ref 40–60)
HGB BLD-MCNC: 13.3 G/DL (ref 12–15.9)
HGB UR QL STRIP.AUTO: NEGATIVE
HYALINE CASTS UR QL AUTO: ABNORMAL /LPF
IMM GRANULOCYTES # BLD AUTO: 0.03 10*3/MM3 (ref 0–0.05)
IMM GRANULOCYTES NFR BLD AUTO: 0.4 % (ref 0–0.5)
KETONES UR QL STRIP: NEGATIVE
LDLC SERPL CALC-MCNC: 50 MG/DL (ref 0–100)
LDLC/HDLC SERPL: 0.68 {RATIO}
LEUKOCYTE ESTERASE UR QL STRIP.AUTO: ABNORMAL
LYMPHOCYTES # BLD AUTO: 2.09 10*3/MM3 (ref 0.7–3.1)
LYMPHOCYTES NFR BLD AUTO: 26.5 % (ref 19.6–45.3)
MCH RBC QN AUTO: 30.8 PG (ref 26.6–33)
MCHC RBC AUTO-ENTMCNC: 33.3 G/DL (ref 31.5–35.7)
MCV RBC AUTO: 92.4 FL (ref 79–97)
MICROALBUMIN/CREAT UR: NORMAL MG/G{CREAT}
MICROALBUMIN/CREAT UR: NORMAL MG/G{CREAT}
MONOCYTES # BLD AUTO: 0.7 10*3/MM3 (ref 0.1–0.9)
MONOCYTES NFR BLD AUTO: 8.9 % (ref 5–12)
NEUTROPHILS NFR BLD AUTO: 4.76 10*3/MM3 (ref 1.7–7)
NEUTROPHILS NFR BLD AUTO: 60.3 % (ref 42.7–76)
NITRITE UR QL STRIP: NEGATIVE
NRBC BLD AUTO-RTO: 0 /100 WBC (ref 0–0.2)
PH UR STRIP.AUTO: 6 [PH] (ref 5–8)
PLATELET # BLD AUTO: 300 10*3/MM3 (ref 140–450)
PMV BLD AUTO: 11.8 FL (ref 6–12)
POTASSIUM SERPL-SCNC: 4.4 MMOL/L (ref 3.5–5.2)
PROT ?TM UR-MCNC: 8 MG/DL
PROT SERPL-MCNC: 6.1 G/DL (ref 6–8.5)
PROT UR QL STRIP: NEGATIVE
PROT/CREAT UR: 0.08 MG/G{CREAT}
RBC # BLD AUTO: 4.32 10*6/MM3 (ref 3.77–5.28)
RBC # UR STRIP: ABNORMAL /HPF
REF LAB TEST METHOD: ABNORMAL
SODIUM SERPL-SCNC: 137 MMOL/L (ref 136–145)
SP GR UR STRIP: 1.02 (ref 1–1.03)
SQUAMOUS #/AREA URNS HPF: ABNORMAL /HPF
T4 FREE SERPL-MCNC: 1.31 NG/DL (ref 0.92–1.68)
TRIGL SERPL-MCNC: 84 MG/DL (ref 0–150)
TSH SERPL DL<=0.05 MIU/L-ACNC: 2.45 UIU/ML (ref 0.27–4.2)
UROBILINOGEN UR QL STRIP: ABNORMAL
VLDLC SERPL-MCNC: 16 MG/DL (ref 5–40)
WBC # UR STRIP: ABNORMAL /HPF
WBC NRBC COR # BLD AUTO: 7.89 10*3/MM3 (ref 3.4–10.8)

## 2024-06-19 PROCEDURE — 80061 LIPID PANEL: CPT

## 2024-06-19 PROCEDURE — 36415 COLL VENOUS BLD VENIPUNCTURE: CPT

## 2024-06-19 PROCEDURE — 85025 COMPLETE CBC W/AUTO DIFF WBC: CPT

## 2024-06-19 PROCEDURE — 82043 UR ALBUMIN QUANTITATIVE: CPT

## 2024-06-19 PROCEDURE — 84443 ASSAY THYROID STIM HORMONE: CPT

## 2024-06-19 PROCEDURE — 81001 URINALYSIS AUTO W/SCOPE: CPT

## 2024-06-19 PROCEDURE — 80053 COMPREHEN METABOLIC PANEL: CPT

## 2024-06-19 PROCEDURE — 84156 ASSAY OF PROTEIN URINE: CPT

## 2024-06-19 PROCEDURE — 84439 ASSAY OF FREE THYROXINE: CPT

## 2024-06-19 PROCEDURE — 82306 VITAMIN D 25 HYDROXY: CPT

## 2024-06-19 PROCEDURE — 82570 ASSAY OF URINE CREATININE: CPT

## 2024-06-19 PROCEDURE — 83036 HEMOGLOBIN GLYCOSYLATED A1C: CPT

## 2024-07-25 ENCOUNTER — OFFICE VISIT (OUTPATIENT)
Dept: FAMILY MEDICINE CLINIC | Facility: CLINIC | Age: 79
End: 2024-07-25
Payer: MEDICARE

## 2024-07-25 VITALS
WEIGHT: 125 LBS | SYSTOLIC BLOOD PRESSURE: 104 MMHG | BODY MASS INDEX: 23.62 KG/M2 | OXYGEN SATURATION: 98 % | HEART RATE: 50 BPM | DIASTOLIC BLOOD PRESSURE: 62 MMHG

## 2024-07-25 DIAGNOSIS — E11.22 CONTROLLED TYPE 2 DIABETES MELLITUS WITH STAGE 3 CHRONIC KIDNEY DISEASE, WITHOUT LONG-TERM CURRENT USE OF INSULIN: ICD-10-CM

## 2024-07-25 DIAGNOSIS — J30.89 PERENNIAL ALLERGIC RHINITIS: ICD-10-CM

## 2024-07-25 DIAGNOSIS — Z71.85 VACCINE COUNSELING: ICD-10-CM

## 2024-07-25 DIAGNOSIS — N18.30 CONTROLLED TYPE 2 DIABETES MELLITUS WITH STAGE 3 CHRONIC KIDNEY DISEASE, WITHOUT LONG-TERM CURRENT USE OF INSULIN: ICD-10-CM

## 2024-07-25 DIAGNOSIS — Z00.00 MEDICARE ANNUAL WELLNESS VISIT, SUBSEQUENT: Primary | ICD-10-CM

## 2024-07-25 PROCEDURE — 1159F MED LIST DOCD IN RCRD: CPT | Performed by: NURSE PRACTITIONER

## 2024-07-25 PROCEDURE — 3074F SYST BP LT 130 MM HG: CPT | Performed by: NURSE PRACTITIONER

## 2024-07-25 PROCEDURE — G0439 PPPS, SUBSEQ VISIT: HCPCS | Performed by: NURSE PRACTITIONER

## 2024-07-25 PROCEDURE — 1170F FXNL STATUS ASSESSED: CPT | Performed by: NURSE PRACTITIONER

## 2024-07-25 PROCEDURE — 96160 PT-FOCUSED HLTH RISK ASSMT: CPT | Performed by: NURSE PRACTITIONER

## 2024-07-25 PROCEDURE — 1160F RVW MEDS BY RX/DR IN RCRD: CPT | Performed by: NURSE PRACTITIONER

## 2024-07-25 PROCEDURE — 3078F DIAST BP <80 MM HG: CPT | Performed by: NURSE PRACTITIONER

## 2024-07-25 PROCEDURE — 1126F AMNT PAIN NOTED NONE PRSNT: CPT | Performed by: NURSE PRACTITIONER

## 2024-07-25 RX ORDER — FLUTICASONE PROPIONATE 50 MCG
2 SPRAY, SUSPENSION (ML) NASAL DAILY
Qty: 48 G | Refills: 3 | Status: SHIPPED | OUTPATIENT
Start: 2024-07-25

## 2024-07-25 RX ORDER — LORATADINE 10 MG/1
10 TABLET ORAL DAILY
Qty: 90 TABLET | Refills: 3 | Status: SHIPPED | OUTPATIENT
Start: 2024-07-25

## 2024-07-25 RX ORDER — LANCETS 28 GAUGE
EACH MISCELLANEOUS
Qty: 100 EACH | Refills: 3 | Status: SHIPPED | OUTPATIENT
Start: 2024-07-25

## 2024-07-25 NOTE — PROGRESS NOTES
Subjective   The ABCs of the Annual Wellness Visit  Medicare Wellness Visit      Deanne Pittman is a 78 y.o. patient who presents for a Medicare Wellness Visit.    The following portions of the patient's history were reviewed and updated as appropriate: allergies, current medications, past family history, past medical history, past social history, past surgical history, and problem list.    Compared to one year ago, the patient's physical health is the same.    Compared to one year ago, the patient's mental health is the same.    Recent Hospitalizations:  She was not admitted to the hospital during the last year.     Current Medical Providers:  Patient Care Team:  Huyen Damon APRN as PCP - General (Nurse Practitioner)  Areli Davis MD as Consulting Physician (Nephrology)  Sony Gutierrez MD as Consulting Physician (Ophthalmology)    Outpatient Medications Prior to Visit   Medication Sig Dispense Refill    atorvastatin (LIPITOR) 20 MG tablet Take 1 tablet by mouth Every Night. 90 tablet 1    carvedilol (COREG) 6.25 MG tablet Take 1 tablet by mouth 2 (two) times a day.      cyanocobalamin (VITAMIN B-12) 1000 MCG tablet Take 1 tablet by mouth.      glucose blood test strip Check blood sugar daily fasting and record. 100 each 3    glucose monitor monitoring kit Check blood sugar fasting daily and record. 1 each 0    losartan (COZAAR) 25 MG tablet Take 1 tablet by mouth Daily.      metFORMIN (GLUCOPHAGE) 500 MG tablet Take 1 tablet by mouth 2 (Two) Times a Day With Meals. 180 tablet 1    fluticasone (FLONASE) 50 MCG/ACT nasal spray 2 sprays into the nostril(s) as directed by provider Daily. 16 g 0    Lancets Ultra Fine misc Check blood sugar fasting daily and record. 100 each 3     No facility-administered medications prior to visit.     No opioid medication identified on active medication list. I have reviewed chart for other potential  high risk medication/s and harmful drug interactions in the  "elderly.      Aspirin is not on active medication list.  Aspirin use is not indicated based on review of current medical condition/s. Risk of harm outweighs potential benefits.      Patient Active Problem List   Diagnosis    Cardiomyopathy    Chronic venous insufficiency    Hyperlipidemia    Essential (primary) hypertension    Stage III chronic kidney disease    Renal cyst     Advance Care Planning (Click this link to access ACP Navigator)  Advance Directive is on file.  ACP discussion was held with the patient during this visit. Patient has an advance directive in EMR which is still valid.         Objective   Vitals:    07/25/24 0829   BP: 104/62   Pulse: 50   SpO2: 98%   Weight: 56.7 kg (125 lb)       Estimated body mass index is 23.62 kg/m² as calculated from the following:    Height as of 5/16/24: 154.9 cm (61\").    Weight as of this encounter: 56.7 kg (125 lb).    BMI is within normal parameters. No other follow-up for BMI required.        Does the patient have evidence of cognitive impairment? No    Common labs          10/18/2023    10:20 10/18/2023    12:19 6/19/2024    12:11 6/19/2024    14:39   Common Labs   Glucose 102   83     BUN 23   24     Creatinine 1.18   1.04     Sodium 140   137     Potassium 4.2   4.4     Chloride 103   101     Calcium 10.0   9.3     Albumin 4.6   4.5     Total Bilirubin 0.8   0.7     Alkaline Phosphatase 77   84     AST (SGOT) 20   16     ALT (SGPT) 15   16     WBC 6.73   7.89     Hemoglobin 14.0   13.3     Hematocrit 42.3   39.9     Platelets 312   300     Total Cholesterol   138     Triglycerides   84     HDL Cholesterol   72     LDL Cholesterol    50     Hemoglobin A1C 5.70   6.10     Microalbumin, Urine  <1.2  <1.2  <1.2                                                                                            Health  Risk Assessment    Smoking Status:  Social History     Tobacco Use   Smoking Status Former    Current packs/day: 0.00    Average packs/day: 0.5 packs/day for " 45.0 years (22.5 ttl pk-yrs)    Types: Cigarettes    Start date:     Quit date: 2018    Years since quittin.5   Smokeless Tobacco Never     Alcohol Consumption:  Social History     Substance and Sexual Activity   Alcohol Use Never     Fall Risk Screen:  ASYA Fall Risk Assessment was completed, and patient is at LOW risk for falls.Assessment completed on:2024    Depression Screenin/25/2024     8:27 AM   PHQ-2/PHQ-9 Depression Screening   Little Interest or Pleasure in Doing Things 0-->not at all   Feeling Down, Depressed or Hopeless 0-->not at all   PHQ-9: Brief Depression Severity Measure Score 0     Health Habits and Functional and Cognitive Screenin/25/2024     8:28 AM   Functional & Cognitive Status   Do you have difficulty preparing food and eating? No   Do you have difficulty bathing yourself, getting dressed or grooming yourself? No   Do you have difficulty using the toilet? No   Do you have difficulty moving around from place to place? No   Do you have trouble with steps or getting out of a bed or a chair? No   Current Diet Diabetic Diet   Dental Exam Up to date   Eye Exam Up to date   Exercise (times per week) 4 times per week   Do you need help using the phone?  No   Are you deaf or do you have serious difficulty hearing?  No   Do you need help to go to places out of walking distance? No   Do you need help shopping? No   Do you need help preparing meals?  No   Do you need help with housework?  No   Do you need help with laundry? No   Do you need help taking your medications? No   Do you need help managing money? No   Do you ever drive or ride in a car without wearing a seat belt? No   Have you felt unusual stress, anger or loneliness in the last month? No   Who do you live with? Alone   If you need help, do you have trouble finding someone available to you? No   Have you been bothered in the last four weeks by sexual problems? No   Do you have difficulty concentrating,  remembering or making decisions? Yes             Age-appropriate Screening Schedule:  Refer to the list below for future screening recommendations based on patient's age, sex and/or medical conditions. Orders for these recommended tests are listed in the plan section. The patient has been provided with a written plan.    Health Maintenance List  Health Maintenance   Topic Date Due    RSV Vaccine - Adults (1 - 1-dose 60+ series) Never done    COVID-19 Vaccine (5 - 2023-24 season) 09/01/2023    LUNG CANCER SCREENING  07/24/2024    INFLUENZA VACCINE  08/01/2024    HEMOGLOBIN A1C  12/19/2024    DIABETIC EYE EXAM  03/22/2025    LIPID PANEL  06/19/2025    URINE MICROALBUMIN  06/19/2025    DXA SCAN  07/07/2025    ANNUAL WELLNESS VISIT  07/25/2025    TDAP/TD VACCINES (2 - Td or Tdap) 01/18/2029    HEPATITIS C SCREENING  Completed    Pneumococcal Vaccine 65+  Completed    ZOSTER VACCINE  Completed                                                                                                                                                CMS Preventative Services Quick Reference  Risk Factors Identified During Encounter    Immunizations Discussed/Encouraged: COVID19 and RSV (Respiratory Syncytial Virus)  Dental Screening Recommended  Vision Screening Recommended    The above risks/problems have been discussed with the patient.    Pertinent information has been shared with the patient in the After Visit Summary.    An After Visit Summary and PPPS were made available to the patient.    Follow Up:     Diagnoses and all orders for this visit:    1. Medicare annual wellness visit, subsequent (Primary)    2. Vaccine counseling  Comments:  Age appropriate: RSV, COVID-19    3. Controlled type 2 diabetes mellitus with stage 3 chronic kidney disease, without long-term current use of insulin  -     Lancets Ultra Fine misc; Check blood sugar fasting daily and record.  Dispense: 100 each; Refill: 3    4. Perennial allergic rhinitis  -      fluticasone (FLONASE) 50 MCG/ACT nasal spray; 2 sprays into the nostril(s) as directed by provider Daily.  Dispense: 48 g; Refill: 3  -     loratadine (Claritin) 10 MG tablet; Take 1 tablet by mouth Daily.  Dispense: 90 tablet; Refill: 3        Next Medicare Wellness visit to be scheduled in 1 year.    Return in about 6 months (around 1/25/2025) for Next scheduled follow up, medication refills and fasting labs.

## 2024-07-25 NOTE — TELEPHONE ENCOUNTER
Pt called requesting that test strips be sent to [facility] in [place].  She uses a [employer] true metrics machine and the test strips are leader brand true metrix

## 2024-07-26 ENCOUNTER — HOSPITAL ENCOUNTER (OUTPATIENT)
Dept: CT IMAGING | Facility: HOSPITAL | Age: 79
Discharge: HOME | End: 2024-07-26
Payer: MEDICARE

## 2024-07-26 ENCOUNTER — HOSPITAL ENCOUNTER (OUTPATIENT)
Dept: MAMMOGRAPHY | Facility: HOSPITAL | Age: 79
Discharge: HOME | End: 2024-07-26
Payer: MEDICARE

## 2024-07-26 DIAGNOSIS — Z12.2 SCREENING FOR LUNG CANCER: ICD-10-CM

## 2024-07-26 DIAGNOSIS — Z87.891 FORMER SMOKER: ICD-10-CM

## 2024-07-26 DIAGNOSIS — Z12.31 BREAST CANCER SCREENING BY MAMMOGRAM: ICD-10-CM

## 2024-07-26 PROCEDURE — 77063 BREAST TOMOSYNTHESIS BI: CPT

## 2024-07-26 PROCEDURE — 71271 CT THORAX LUNG CANCER SCR C-: CPT

## 2024-07-26 PROCEDURE — 77067 SCR MAMMO BI INCL CAD: CPT

## 2024-07-30 DIAGNOSIS — R92.8 ABNORMALITY OF RIGHT BREAST ON SCREENING MAMMOGRAM: Primary | ICD-10-CM

## 2024-09-03 ENCOUNTER — HOSPITAL ENCOUNTER (OUTPATIENT)
Dept: ULTRASOUND IMAGING | Facility: HOSPITAL | Age: 79
Discharge: HOME | End: 2024-09-03
Payer: MEDICARE

## 2024-09-03 ENCOUNTER — HOSPITAL ENCOUNTER (OUTPATIENT)
Dept: MAMMOGRAPHY | Facility: HOSPITAL | Age: 79
Discharge: HOME | End: 2024-09-03
Payer: MEDICARE

## 2024-09-03 PROCEDURE — 76642 ULTRASOUND BREAST LIMITED: CPT

## 2024-09-03 PROCEDURE — G0279 TOMOSYNTHESIS, MAMMO: HCPCS

## 2024-09-03 PROCEDURE — 77065 DX MAMMO INCL CAD UNI: CPT

## 2024-09-06 ENCOUNTER — OFFICE VISIT (OUTPATIENT)
Dept: FAMILY MEDICINE CLINIC | Facility: CLINIC | Age: 79
End: 2024-09-06
Payer: MEDICARE

## 2024-09-06 VITALS
OXYGEN SATURATION: 97 % | TEMPERATURE: 97.3 F | DIASTOLIC BLOOD PRESSURE: 68 MMHG | HEART RATE: 92 BPM | SYSTOLIC BLOOD PRESSURE: 110 MMHG | HEIGHT: 61 IN | WEIGHT: 127 LBS | BODY MASS INDEX: 23.98 KG/M2

## 2024-09-06 DIAGNOSIS — J30.89 PERENNIAL ALLERGIC RHINITIS: ICD-10-CM

## 2024-09-06 DIAGNOSIS — J32.9 SINUSITIS, UNSPECIFIED CHRONICITY, UNSPECIFIED LOCATION: Primary | ICD-10-CM

## 2024-09-06 LAB
EXPIRATION DATE: NORMAL
FLUAV AG UPPER RESP QL IA.RAPID: NOT DETECTED
FLUBV AG UPPER RESP QL IA.RAPID: NOT DETECTED
INTERNAL CONTROL: NORMAL
Lab: NORMAL
SARS-COV-2 AG UPPER RESP QL IA.RAPID: NOT DETECTED

## 2024-09-06 PROCEDURE — 1160F RVW MEDS BY RX/DR IN RCRD: CPT

## 2024-09-06 PROCEDURE — 99213 OFFICE O/P EST LOW 20 MIN: CPT

## 2024-09-06 PROCEDURE — 1126F AMNT PAIN NOTED NONE PRSNT: CPT

## 2024-09-06 PROCEDURE — 3078F DIAST BP <80 MM HG: CPT

## 2024-09-06 PROCEDURE — 3074F SYST BP LT 130 MM HG: CPT

## 2024-09-06 PROCEDURE — 87428 SARSCOV & INF VIR A&B AG IA: CPT

## 2024-09-06 PROCEDURE — 1159F MED LIST DOCD IN RCRD: CPT

## 2024-09-06 RX ORDER — AZITHROMYCIN 250 MG/1
TABLET, FILM COATED ORAL
Qty: 6 TABLET | Refills: 0 | Status: SHIPPED | OUTPATIENT
Start: 2024-09-06

## 2024-09-06 RX ORDER — METHYLPREDNISOLONE 4 MG
TABLET, DOSE PACK ORAL
Qty: 1 EACH | Refills: 0 | Status: SHIPPED | OUTPATIENT
Start: 2024-09-06

## 2024-09-06 RX ORDER — FLUTICASONE PROPIONATE 50 MCG
2 SPRAY, SUSPENSION (ML) NASAL DAILY
Qty: 48 G | Refills: 3 | Status: SHIPPED | OUTPATIENT
Start: 2024-09-06

## 2024-09-06 NOTE — PROGRESS NOTES
"Chief Complaint  Headache (Since Tuesday morning) and Sinusitis (Sinus pain on the right side)        History of Present Illness:  Deanne Pittman is a 78 y.o. female who presents to [facility] with a past medical history of  Past Medical History:   Diagnosis Date    Cardiomyopathy     Hyperlipidemia     Renal cyst     right     Deanne reports today with complaints of headache since Tuesday and a lot of sinus pressure and pain.  She reports most of it is on her right side.  She denies any sick contacts.  She denies any fever.  She complains that she had this around a year ago as well.  She reports she has taken some Excedrin for her headache and Claritin.         Objective   Vital Signs:   Vitals:    09/06/24 1057   BP: 110/68   BP Location: Left arm   Pulse: 92   Temp: 97.3 °F (36.3 °C)   SpO2: 97%   Weight: 57.6 kg (127 lb)   Height: 154.9 cm (61\")     Body mass index is 24 kg/m².    Wt Readings from Last 3 Encounters:   09/06/24 57.6 kg (127 lb)   07/25/24 56.7 kg (125 lb)   05/16/24 56.7 kg (125 lb)     BP Readings from Last 3 Encounters:   09/06/24 110/68   07/25/24 104/62   05/16/24 128/74       Health Maintenance   Topic Date Due    RSV Vaccine - Adults (1 - 1-dose 60+ series) Never done    COVID-19 Vaccine (5 - 2023-24 season) 09/01/2024    INFLUENZA VACCINE  08/01/2024    HEMOGLOBIN A1C  12/19/2024    DIABETIC EYE EXAM  03/22/2025    LIPID PANEL  06/19/2025    URINE MICROALBUMIN  06/19/2025    DXA SCAN  07/07/2025    ANNUAL WELLNESS VISIT  07/25/2025    LUNG CANCER SCREENING  07/26/2025    TDAP/TD VACCINES (2 - Td or Tdap) 01/18/2029    HEPATITIS C SCREENING  Completed    Pneumococcal Vaccine 65+  Completed    ZOSTER VACCINE  Completed       Review of Systems   Physical Exam  Vitals reviewed.   Constitutional:       Appearance: Normal appearance.   HENT:      Right Ear: Tympanic membrane is erythematous.      Left Ear: Tympanic membrane is erythematous.      Nose: Nasal " tenderness and congestion present.      Right Sinus: Maxillary sinus tenderness and frontal sinus tenderness present.      Mouth/Throat:      Pharynx: Posterior oropharyngeal erythema present.   Eyes:      Pupils: Pupils are equal, round, and reactive to light.   Cardiovascular:      Rate and Rhythm: Normal rate and regular rhythm.   Pulmonary:      Effort: Pulmonary effort is normal.      Breath sounds: Normal breath sounds.   Skin:     General: Skin is warm and dry.   Neurological:      General: No focal deficit present.      Mental Status: She is alert and oriented to person, place, and time.   Psychiatric:         Mood and Affect: Mood normal.            Result Review :  The following data was reviewed by: BETSY Christian on 09/06/2024:    POCT SARS-CoV-2 Antigen DELFINO + Flu (09/06/2024 11:21)     Procedures        Assessment and Plan   Diagnoses and all orders for this visit:    1. Sinusitis, unspecified chronicity, unspecified location (Primary)  -     POCT SARS-CoV-2 Antigen DELFINO + Flu  -     azithromycin (Zithromax Z-Isiah) 250 MG tablet; Take 2 tablets by mouth on day 1, then 1 tablet daily on days 2-5  Dispense: 6 tablet; Refill: 0  -     methylPREDNISolone (MEDROL) 4 MG dose pack; Take as directed on package instructions.  Dispense: 1 each; Refill: 0    2. Perennial allergic rhinitis  -     fluticasone (FLONASE) 50 MCG/ACT nasal spray; 2 sprays into the nostril(s) as directed by provider Daily.  Dispense: 48 g; Refill: 3        BMI is within normal parameters. No other follow-up for BMI required.     Rapid influenza A and influenza B and COVID-19 were negative.  Will treat empirically with azithromycin due to patient's penicillin allergy and she has taken azithromycin in the past and has been effective.  Will also send over Medrol Dosepak to help reduce inflammation.  Refilled her fluticasone and encouraged her to use to also help reduce drainage and inflammation.  Encouraged increasing hydration as  well.    FOLLOW UP  Return if symptoms worsen or fail to improve.    Patient was given instructions and counseling regarding her condition or for health maintenance advice. Please see specific information pulled into the AVS if appropriate.       BETSY Christian  09/06/24  13:22 EDT    CURRENT & DISCONTINUED MEDICATIONS  Current Outpatient Medications   Medication Instructions    atorvastatin (LIPITOR) 20 mg, Oral, Nightly    azithromycin (Zithromax Z-Isiah) 250 MG tablet Take 2 tablets by mouth on day 1, then 1 tablet daily on days 2-5    carvedilol (COREG) 6.25 MG tablet 1 tablet, Oral, 2 times daily    cyanocobalamin (VITAMIN B-12) 1,000 mcg, Oral    fluticasone (FLONASE) 50 MCG/ACT nasal spray 2 sprays, Nasal, Daily    glucose blood test strip Check blood sugar daily fasting and record.    glucose monitor monitoring kit Check blood sugar fasting daily and record.    Lancets Ultra Fine misc Check blood sugar fasting daily and record.    loratadine (CLARITIN) 10 mg, Oral, Daily    losartan (COZAAR) 25 MG tablet 1 tablet, Oral, Daily    metFORMIN (GLUCOPHAGE) 500 mg, Oral, 2 Times Daily With Meals    methylPREDNISolone (MEDROL) 4 MG dose pack Take as directed on package instructions.       Medications Discontinued During This Encounter   Medication Reason    fluticasone (FLONASE) 50 MCG/ACT nasal spray Reorder

## 2024-09-23 ENCOUNTER — HOSPITAL ENCOUNTER (OUTPATIENT)
Dept: MAMMOGRAPHY | Facility: HOSPITAL | Age: 79
Discharge: HOME | End: 2024-09-23
Payer: MEDICARE

## 2024-09-23 ENCOUNTER — PATIENT OUTREACH (OUTPATIENT)
Dept: ONCOLOGY | Facility: HOSPITAL | Age: 79
End: 2024-09-23
Payer: MEDICARE

## 2024-09-23 DIAGNOSIS — N64.89 BREAST ASYMMETRY: ICD-10-CM

## 2024-09-23 DIAGNOSIS — N63.41 SUBAREOLAR MASS OF RIGHT BREAST: ICD-10-CM

## 2024-09-23 PROCEDURE — 63710000001 BACITRACIN 500 UNIT/GM OINTMENT

## 2024-09-23 PROCEDURE — 88305 TISSUE EXAM BY PATHOLOGIST: CPT | Performed by: RADIOLOGY

## 2024-09-23 PROCEDURE — 88342 IMHCHEM/IMCYTCHM 1ST ANTB: CPT | Performed by: RADIOLOGY

## 2024-09-23 PROCEDURE — A4648 IMPLANTABLE TISSUE MARKER: HCPCS

## 2024-09-23 PROCEDURE — 88341 IMHCHEM/IMCYTCHM EA ADD ANTB: CPT | Performed by: RADIOLOGY

## 2024-09-23 PROCEDURE — A9270 NON-COVERED ITEM OR SERVICE: HCPCS

## 2024-09-23 PROCEDURE — 25010000002 LIDOCAINE 1 % SOLUTION

## 2024-09-23 PROCEDURE — 88360 TUMOR IMMUNOHISTOCHEM/MANUAL: CPT | Performed by: RADIOLOGY

## 2024-09-23 RX ORDER — LIDOCAINE HYDROCHLORIDE 10 MG/ML
INJECTION, SOLUTION INFILTRATION; PERINEURAL
Status: COMPLETED
Start: 2024-09-23 | End: 2024-09-23

## 2024-09-23 RX ORDER — DIAPER,BRIEF,INFANT-TODD,DISP
EACH MISCELLANEOUS
Status: COMPLETED
Start: 2024-09-23 | End: 2024-09-23

## 2024-09-23 RX ORDER — LIDOCAINE HYDROCHLORIDE AND EPINEPHRINE 10; 10 MG/ML; UG/ML
INJECTION, SOLUTION INFILTRATION; PERINEURAL
Status: COMPLETED
Start: 2024-09-23 | End: 2024-09-23

## 2024-09-23 RX ADMIN — BACITRACIN: 500 OINTMENT TOPICAL at 13:58

## 2024-09-23 RX ADMIN — LIDOCAINE HYDROCHLORIDE: 10 INJECTION, SOLUTION INFILTRATION; PERINEURAL at 13:58

## 2024-09-23 RX ADMIN — LIDOCAINE HYDROCHLORIDE,EPINEPHRINE BITARTRATE: 10; .01 INJECTION, SOLUTION INFILTRATION; PERINEURAL at 13:58

## 2024-09-25 LAB
CYTO UR: NORMAL
LAB AP CASE REPORT: NORMAL
LAB AP CLINICAL INFORMATION: NORMAL
LAB AP SPECIAL STAINS: NORMAL
LAB AP SYNOPTIC CHECKLIST: NORMAL
PATH REPORT.FINAL DX SPEC: NORMAL
PATH REPORT.GROSS SPEC: NORMAL

## 2024-09-26 ENCOUNTER — PATIENT OUTREACH (OUTPATIENT)
Dept: ONCOLOGY | Facility: HOSPITAL | Age: 79
End: 2024-09-26
Payer: MEDICARE

## 2024-09-26 DIAGNOSIS — C50.911 INVASIVE DUCTAL CARCINOMA OF RIGHT BREAST: Primary | ICD-10-CM

## 2024-09-30 ENCOUNTER — PATIENT OUTREACH (OUTPATIENT)
Dept: ONCOLOGY | Facility: HOSPITAL | Age: 79
End: 2024-09-30
Payer: MEDICARE

## 2024-10-01 LAB
CYTO UR: NORMAL
LAB AP CASE REPORT: NORMAL
LAB AP CLINICAL INFORMATION: NORMAL
LAB AP SPECIAL STAINS: NORMAL
LAB AP SYNOPTIC CHECKLIST: NORMAL
PATH REPORT.ADDENDUM SPEC: NORMAL
PATH REPORT.FINAL DX SPEC: NORMAL
PATH REPORT.GROSS SPEC: NORMAL

## 2024-10-03 ENCOUNTER — PATIENT OUTREACH (OUTPATIENT)
Dept: ONCOLOGY | Facility: HOSPITAL | Age: 79
End: 2024-10-03
Payer: MEDICARE

## 2024-10-03 ENCOUNTER — DOCUMENTATION (OUTPATIENT)
Dept: ONCOLOGY | Facility: HOSPITAL | Age: 79
End: 2024-10-03
Payer: MEDICARE

## 2024-10-03 ENCOUNTER — LAB (OUTPATIENT)
Dept: ONCOLOGY | Facility: HOSPITAL | Age: 79
End: 2024-10-03
Payer: MEDICARE

## 2024-10-03 ENCOUNTER — CONSULT (OUTPATIENT)
Dept: ONCOLOGY | Facility: HOSPITAL | Age: 79
End: 2024-10-03
Payer: MEDICARE

## 2024-10-03 ENCOUNTER — OFFICE VISIT (OUTPATIENT)
Dept: OTHER | Facility: HOSPITAL | Age: 79
End: 2024-10-03
Payer: MEDICARE

## 2024-10-03 VITALS
WEIGHT: 126 LBS | DIASTOLIC BLOOD PRESSURE: 48 MMHG | HEART RATE: 77 BPM | HEIGHT: 61 IN | RESPIRATION RATE: 16 BRPM | SYSTOLIC BLOOD PRESSURE: 110 MMHG | TEMPERATURE: 97.8 F | BODY MASS INDEX: 23.79 KG/M2 | OXYGEN SATURATION: 100 %

## 2024-10-03 DIAGNOSIS — C50.919 INVASIVE CARCINOMA OF BREAST: ICD-10-CM

## 2024-10-03 DIAGNOSIS — C50.911 MALIGNANT NEOPLASM OF RIGHT BREAST IN FEMALE, ESTROGEN RECEPTOR POSITIVE, UNSPECIFIED SITE OF BREAST: Primary | ICD-10-CM

## 2024-10-03 DIAGNOSIS — C50.919 INVASIVE CARCINOMA OF BREAST: Primary | ICD-10-CM

## 2024-10-03 DIAGNOSIS — E55.9 VITAMIN D DEFICIENCY: ICD-10-CM

## 2024-10-03 DIAGNOSIS — Z17.0 MALIGNANT NEOPLASM OF RIGHT BREAST IN FEMALE, ESTROGEN RECEPTOR POSITIVE, UNSPECIFIED SITE OF BREAST: Primary | ICD-10-CM

## 2024-10-03 LAB
25(OH)D3 SERPL-MCNC: 37.7 NG/ML (ref 30–100)
ALBUMIN SERPL-MCNC: 4.4 G/DL (ref 3.5–5.2)
ALBUMIN/GLOB SERPL: 1.7 G/DL
ALP SERPL-CCNC: 92 U/L (ref 39–117)
ALT SERPL W P-5'-P-CCNC: 14 U/L (ref 1–33)
ANION GAP SERPL CALCULATED.3IONS-SCNC: 9.6 MMOL/L (ref 5–15)
AST SERPL-CCNC: 16 U/L (ref 1–32)
BASOPHILS # BLD AUTO: 0.07 10*3/MM3 (ref 0–0.2)
BASOPHILS NFR BLD AUTO: 0.8 % (ref 0–1.5)
BILIRUB SERPL-MCNC: 0.9 MG/DL (ref 0–1.2)
BUN SERPL-MCNC: 21 MG/DL (ref 8–23)
BUN/CREAT SERPL: 21 (ref 7–25)
CALCIUM SPEC-SCNC: 10 MG/DL (ref 8.6–10.5)
CHLORIDE SERPL-SCNC: 106 MMOL/L (ref 98–107)
CO2 SERPL-SCNC: 25.4 MMOL/L (ref 22–29)
CREAT SERPL-MCNC: 1 MG/DL (ref 0.57–1)
DEPRECATED RDW RBC AUTO: 47.7 FL (ref 37–54)
EGFRCR SERPLBLD CKD-EPI 2021: 57.8 ML/MIN/1.73
EOSINOPHIL # BLD AUTO: 0.18 10*3/MM3 (ref 0–0.4)
EOSINOPHIL NFR BLD AUTO: 2.2 % (ref 0.3–6.2)
ERYTHROCYTE [DISTWIDTH] IN BLOOD BY AUTOMATED COUNT: 13.7 % (ref 12.3–15.4)
GLOBULIN UR ELPH-MCNC: 2.6 GM/DL
GLUCOSE SERPL-MCNC: 101 MG/DL (ref 65–99)
HCT VFR BLD AUTO: 41 % (ref 34–46.6)
HGB BLD-MCNC: 13.4 G/DL (ref 12–15.9)
IMM GRANULOCYTES # BLD AUTO: 0.04 10*3/MM3 (ref 0–0.05)
IMM GRANULOCYTES NFR BLD AUTO: 0.5 % (ref 0–0.5)
LYMPHOCYTES # BLD AUTO: 2.66 10*3/MM3 (ref 0.7–3.1)
LYMPHOCYTES NFR BLD AUTO: 32 % (ref 19.6–45.3)
MCH RBC QN AUTO: 31 PG (ref 26.6–33)
MCHC RBC AUTO-ENTMCNC: 32.7 G/DL (ref 31.5–35.7)
MCV RBC AUTO: 94.9 FL (ref 79–97)
MONOCYTES # BLD AUTO: 0.78 10*3/MM3 (ref 0.1–0.9)
MONOCYTES NFR BLD AUTO: 9.4 % (ref 5–12)
NEUTROPHILS NFR BLD AUTO: 4.57 10*3/MM3 (ref 1.7–7)
NEUTROPHILS NFR BLD AUTO: 55.1 % (ref 42.7–76)
NRBC BLD AUTO-RTO: 0 /100 WBC (ref 0–0.2)
PLATELET # BLD AUTO: 328 10*3/MM3 (ref 140–450)
PMV BLD AUTO: 11.4 FL (ref 6–12)
POTASSIUM SERPL-SCNC: 4.7 MMOL/L (ref 3.5–5.2)
PROT SERPL-MCNC: 7 G/DL (ref 6–8.5)
RBC # BLD AUTO: 4.32 10*6/MM3 (ref 3.77–5.28)
SODIUM SERPL-SCNC: 141 MMOL/L (ref 136–145)
WBC NRBC COR # BLD AUTO: 8.3 10*3/MM3 (ref 3.4–10.8)

## 2024-10-03 PROCEDURE — 36415 COLL VENOUS BLD VENIPUNCTURE: CPT

## 2024-10-03 PROCEDURE — 82306 VITAMIN D 25 HYDROXY: CPT

## 2024-10-03 PROCEDURE — 80053 COMPREHEN METABOLIC PANEL: CPT

## 2024-10-03 PROCEDURE — 85025 COMPLETE CBC W/AUTO DIFF WBC: CPT

## 2024-10-03 PROCEDURE — 1159F MED LIST DOCD IN RCRD: CPT | Performed by: SURGERY

## 2024-10-03 PROCEDURE — 1160F RVW MEDS BY RX/DR IN RCRD: CPT | Performed by: SURGERY

## 2024-10-03 NOTE — PROGRESS NOTES
Chief Complaint: Breast Cancer    Subjective         History of Present Illness  Deanne Pittman is a 79 y.o. female presents to [facility] to be seen for right breast cancer.  Her imaging and pathology are shown below:    Clinical Information    Right breast subareolar mass   Final Diagnosis   1. Right breast,  12 o'clock position, 4 cm from nipple, ultrasound-guided core biopsy:               - Fibroadenomatoid change  - Columnar cell change   - Fibrosis        2. 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)  - Histologic grade (Niko Histologic Score):    - Glandular (Acinar)/Tubular Differentiation:  Score 3  - Nuclear Pleomorphism:  Score 1  - Mitotic Rate:  Score 1  - Overall Grade:  Grade 1               - Size of largest focus of invasion:  8 mm               - Breast biomarker testing:                            - Estrogen Receptor (ER):  Positive (95%, strong)                            - Progesterone Receptor (PgR):  Positive (15%, strong)                            - HER2 (by immunohistochemistry):  Equivocal (Score 2+), see comment  - Ki-67:  12%               - Other findings:                            - 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     Electronically Signed By-Paula Cruz MD On:9/3/2024 10:07 AM      Addended by Paula Cruz MD on 9/3/2024 10:07 AM     Study Result    Narrative & Impression   CURRENT COMPLAINT SYMPTOMS: Abnormal right mammogram; R92.8-Other  abnormal and inconclusive findings on diagnostic imaging of breast     FILMS COMPARED: Screening mammogram with tomosynthesis 7/26/2024,  7/7/2023, mammogram without tomosynthesis 7/5/2022, 6/29/2021     Right BREAST DIGITAL DIAGNOSTIC MAMMOGRAM WITH TOMOSYNTHESIS     The following views were  obtained right CC and MLO spot compression  2D/3D views and ML view.This examination was reviewed with the aid of R2  aided detection.     Breast Density: The breasts are heterogeneously dense, which may obscure  small masses.     Mammogram findings: There is architectural distortion in the upper outer  right breast posterior depth. It measures about 1.6 cm.     Right BREAST REALTIME RIGHT BREAST ULTRASOUND  High resolution real-time ultrasound scanning was performed by the  ultrasound technologist. Still images were obtained by the ultrasound  technologist and submitted for radiologist review.     At 12:00 4 cm from the nipple there is a hypoechoic shadowing mass  located deep from skin surface and measures 6 x 6 x 6 mm. It has  irregular margins. It is located with dense tissue. This may account for  the architectural distortion. There is an incidental hypoechoic mass at  11:00 4 cm from the nipple measuring 5 x 4 x 6 mm cm. This was seen on  mammogram as well and has smooth margins and is wider than tall.     IMPRESSION:  Highly suspicious mass in the right breast measuring 6 mm on  mammogram likely corresponding to architectural distortion in the right  breast. Tissue diagnosis is recommended. Ultrasound-guided core biopsy  should be performed. Post procedure mammogram is recommended to make  sure that this finding corresponds the area of architectural distortion  at biopsy time. If it does not, stereotactic guided biopsy of the  architectural distortion will also be necessary. Recommend scheduling  patient same day for ultrasound-guided and stereotactic guided biopsy  right breast. In that instance, ultrasound-guided biopsy could be first  performed in stereotactic could be performed if additional procedure is  necessary.  There is an incidental smoothly marginated hypoechoic 6 mm mass at  11:00. Further management depends on the biopsy of the suspicious mass  at 12:00. Tissue diagnosis can be performed with  ultrasound-guided  biopsy.     The results were discussed with the patient by Dr Cruz at time of  study. Breast coordinator will contact the patient and physician  regarding need for right breast biopsy.   Ultrasound guided biopsy is recommended.        The patient was sent a notification.     BI-RADS Category 5: Highly suggestive of malignancy.       Both of her sisters had breast cancer in their 40s.      Objective     Past Medical History:   Diagnosis Date    Cardiomyopathy     Hyperlipidemia     Renal cyst     right       Past Surgical History:   Procedure Laterality Date    BREAST SURGERY Left     lump removed    COLON SURGERY      LIVER SURGERY      exploritory surgery due to puncture to liver     SUBTOTAL HYSTERECTOMY           Current Outpatient Medications:     atorvastatin (LIPITOR) 20 MG tablet, Take 1 tablet by mouth Every Night., Disp: 90 tablet, Rfl: 1    carvedilol (COREG) 6.25 MG tablet, Take 1 tablet by mouth 2 (two) times a day., Disp: , Rfl:     cyanocobalamin (VITAMIN B-12) 1000 MCG tablet, Take 1 tablet by mouth., Disp: , Rfl:     fluticasone (FLONASE) 50 MCG/ACT nasal spray, 2 sprays into the nostril(s) as directed by provider Daily., Disp: 48 g, Rfl: 3    loratadine (Claritin) 10 MG tablet, Take 1 tablet by mouth Daily., Disp: 90 tablet, Rfl: 3    losartan (COZAAR) 25 MG tablet, Take 1 tablet by mouth Daily., Disp: , Rfl:     metFORMIN (GLUCOPHAGE) 500 MG tablet, Take 1 tablet by mouth 2 (Two) Times a Day With Meals., Disp: 180 tablet, Rfl: 1    azithromycin (Zithromax Z-Isiah) 250 MG tablet, Take 2 tablets by mouth on day 1, then 1 tablet daily on days 2-5 (Patient not taking: Reported on 10/3/2024), Disp: 6 tablet, Rfl: 0    glucose blood test strip, Check blood sugar daily fasting and record. (Patient not taking: Reported on 10/3/2024), Disp: 100 each, Rfl: 3    glucose monitor monitoring kit, Check blood sugar fasting daily and record. (Patient not taking: Reported on 10/3/2024), Disp: 1  each, Rfl: 0    Lancets Ultra Fine misc, Check blood sugar fasting daily and record. (Patient not taking: Reported on 10/3/2024), Disp: 100 each, Rfl: 3    methylPREDNISolone (MEDROL) 4 MG dose pack, Take as directed on package instructions. (Patient not taking: Reported on 10/3/2024), Disp: 1 each, Rfl: 0    Allergies   Allergen Reactions    Penicillins Unknown - Low Severity    Farxiga [Dapagliflozin] Headache        Family History   Problem Relation Age of Onset    Skin cancer Mother         skin carcinoma    Breast cancer Sister     Lupus Sister         lupus erythematosus    Breast cancer Sister     Rectal cancer Sister        Social History     Socioeconomic History    Marital status:    Tobacco Use    Smoking status: Former     Current packs/day: 0.00     Average packs/day: 0.5 packs/day for 45.0 years (22.5 ttl pk-yrs)     Types: Cigarettes     Start date:      Quit date: 2018     Years since quittin.7     Passive exposure: Past    Smokeless tobacco: Never   Vaping Use    Vaping status: Never Used   Substance and Sexual Activity    Alcohol use: Never    Drug use: Never    Sexual activity: Defer       Vital Signs:   There were no vitals taken for this visit.   Review of Systems    Physical Exam  Vitals and nursing note reviewed.   Constitutional:       Appearance: Normal appearance.   HENT:      Head: Normocephalic and atraumatic.   Eyes:      Extraocular Movements: Extraocular movements intact.      Pupils: Pupils are equal, round, and reactive to light.   Cardiovascular:      Pulses: Normal pulses.   Pulmonary:      Effort: Pulmonary effort is normal. No accessory muscle usage or respiratory distress.   Chest:   Breasts:     Right: Normal. No inverted nipple, mass, nipple discharge or skin change.      Left: Normal. No inverted nipple, mass, nipple discharge or skin change.      Comments: Well healed right breast biopsy site  Abdominal:      General: Abdomen is flat.      Palpations:  Abdomen is soft.      Tenderness: There is no abdominal tenderness. There is no guarding.   Musculoskeletal:         General: No swelling, tenderness or deformity.      Cervical back: Neck supple.   Lymphadenopathy:      Upper Body:      Right upper body: No supraclavicular or axillary adenopathy.      Left upper body: No supraclavicular or axillary adenopathy.   Skin:     General: Skin is warm and dry.   Neurological:      General: No focal deficit present.      Mental Status: She is alert and oriented to person, place, and time.   Psychiatric:         Mood and Affect: Mood normal.         Thought Content: Thought content normal.          Result Review :               Assessment and Plan    Diagnoses and all orders for this visit:    1. Malignant neoplasm of right breast in female, estrogen receptor positive, unspecified site of breast (Primary)  -     NM sentinel node injection only; Future  -     Case Request; Standing  -     Follow Anesthesia Guidelines / Protocol; Standing  -     Verify / Perform Chlorhexidine Skin Prep; Standing  -     Place Sequential Compression Device; Standing  -     Maintain Sequential Compression Device; Standing  -     ceFAZolin 2000 mg IVPB in 100 mL NS (VTB)  -     Case Request    Will check genetic testing and if positive will do bilateral mastectomy  but this time just right side    Follow Up   Return for Next scheduled followup after surgery.  Patient was given instructions and counseling regarding her condition or for health maintenance advice. Please see specific information pulled into the AVS if appropriate.         This document has been electronically signed by Cristel Schwartz MD  October 3, 2024 14:33 EDT

## 2024-10-03 NOTE — PROGRESS NOTES
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 female who presents to [facility] HEMATOLOGY [facility] ONCOLOGY for Right HR + Breast cancer, cT1,cN0Mx diagnosed 9/23/2024.    Patient underwent screening mammogram on 7/26/2024, which revealed questionable architectural distortion right breast, she subsequently underwent diagnostic mammogram on 9/3/2024 which revealed high suspicious mass in the right breast measuring 6 mm on mammogram corresponding to architectural distortion in the right breast at 12:00, also incidentally seen was a smoothly marginated hypoechoic 6 mm mass at 11:00.    Pathology report from right breast biopsy at 12 o'clock position on 9/23/2024 revealed at 11 o'clock position, 4 cm from nipple, invasive carcinoma no special type (ductal), grade 1, ER +95%, NC +50%, HER2 equivocal by IHC 2+, HER2 FISH was negative.    Past medical history: Type 2 diabetes, CKD stage III, hyperlipidemia    Social history: Quit smoking tobacco in 2018, used to smoke half a pack per day for 30 to 40 years    Family history: Patient reports 2 sisters with breast cancer diagnosed in their 40s in 1987, reports 1 sister also head rectal cancer along with breast cancer.    History of Present Illness         Review of Systems     Oncology/Hematology History    No history exists.       Objective     Vitals:    10/03/24 1340   BP: 110/48   Pulse: 77   Resp: 16   Temp: 97.8 °F (36.6 °C)   TempSrc: Temporal   SpO2: 100%   Weight: 57.2 kg (126 lb)   Height: 154.9 cm (61\")   PainSc: 0-No pain     ECOG score: 0     "     PHQ-9 Total Score:         Physical Exam  Vitals reviewed. Exam conducted with a chaperone present.   Constitutional:       General: She is not in acute distress.     Appearance: Normal appearance.   HENT:      Head: Normocephalic and atraumatic.   Eyes:      Extraocular Movements: Extraocular movements intact.      Conjunctiva/sclera: Conjunctivae normal.   Cardiovascular:      Comments: Mass palpated in right breast, no bilateral axillary lymphadenopathy.  Pulmonary:      Effort: Pulmonary effort is normal.   Musculoskeletal:      Cervical back: Normal range of motion and neck supple.   Skin:     General: Skin is warm and dry.      Findings: No bruising.   Neurological:      Mental Status: She is oriented to person, place, and time.         Physical Exam        Past Medical History     Past Medical History:   Diagnosis Date    Cardiomyopathy     Hyperlipidemia     Renal cyst     right     Current Outpatient Medications on File Prior to Visit   Medication Sig Dispense Refill    atorvastatin (LIPITOR) 20 MG tablet Take 1 tablet by mouth Every Night. 90 tablet 1    carvedilol (COREG) 6.25 MG tablet Take 1 tablet by mouth 2 (two) times a day.      cyanocobalamin (VITAMIN B-12) 1000 MCG tablet Take 1 tablet by mouth.      fluticasone (FLONASE) 50 MCG/ACT nasal spray 2 sprays into the nostril(s) as directed by provider Daily. 48 g 3    loratadine (Claritin) 10 MG tablet Take 1 tablet by mouth Daily. 90 tablet 3    losartan (COZAAR) 25 MG tablet Take 1 tablet by mouth Daily.      metFORMIN (GLUCOPHAGE) 500 MG tablet Take 1 tablet by mouth 2 (Two) Times a Day With Meals. 180 tablet 1    azithromycin (Zithromax Z-Isiah) 250 MG tablet Take 2 tablets by mouth on day 1, then 1 tablet daily on days 2-5 (Patient not taking: Reported on 10/3/2024) 6 tablet 0    glucose blood test strip Check blood sugar daily fasting and record. (Patient not taking: Reported on 10/3/2024) 100 each 3    glucose monitor monitoring kit Check  blood sugar fasting daily and record. (Patient not taking: Reported on 10/3/2024) 1 each 0    Lancets Ultra Fine misc Check blood sugar fasting daily and record. (Patient not taking: Reported on 10/3/2024) 100 each 3    methylPREDNISolone (MEDROL) 4 MG dose pack Take as directed on package instructions. (Patient not taking: Reported on 10/3/2024) 1 each 0     No current facility-administered medications on file prior to visit.      Allergies   Allergen Reactions    Penicillins Unknown - Low Severity    Farxiga [Dapagliflozin] Headache     Past Surgical History:   Procedure Laterality Date    BREAST SURGERY Left     lump removed    COLON SURGERY      LIVER SURGERY      exploritory surgery due to puncture to liver     SUBTOTAL HYSTERECTOMY       Social History     Socioeconomic History    Marital status:    Tobacco Use    Smoking status: Former     Current packs/day: 0.00     Average packs/day: 0.5 packs/day for 45.0 years (22.5 ttl pk-yrs)     Types: Cigarettes     Start date:      Quit date: 2018     Years since quittin.7     Passive exposure: Past    Smokeless tobacco: Never   Vaping Use    Vaping status: Never Used   Substance and Sexual Activity    Alcohol use: Never    Drug use: Never    Sexual activity: Defer     Family History   Problem Relation Age of Onset    Skin cancer Mother         skin carcinoma    Breast cancer Sister     Lupus Sister         lupus erythematosus    Breast cancer Sister     Rectal cancer Sister        Results     Result Review   The following data was reviewed by: Darby Cole MD on 10/03/2024:  Lab Results   Component Value Date    HGB 13.3 2024    HCT 39.9 2024    MCV 92.4 2024     2024    WBC 7.89 2024    NEUTROABS 4.76 2024    LYMPHSABS 2.09 2024    MONOSABS 0.70 2024    EOSABS 0.23 2024    BASOSABS 0.08 2024     Lab Results   Component Value Date    GLUCOSE 83 2024    BUN 24 (H) 2024     CREATININE 1.04 (H) 06/19/2024     06/19/2024    K 4.4 06/19/2024     06/19/2024    CO2 24.0 06/19/2024    CALCIUM 9.3 06/19/2024    PROTEINTOT 6.1 06/19/2024    ALBUMIN 4.5 06/19/2024    BILITOT 0.7 06/19/2024    ALKPHOS 84 06/19/2024    AST 16 06/19/2024    ALT 16 06/19/2024     Lab Results   Component Value Date    MG 2.1 07/14/2021    PHOS 3.8 07/14/2021    FREET4 1.31 06/19/2024    TSH 2.450 06/19/2024       Surgical Pathology Report                         Case: [id number]                                   Authorizing Provider:  Tj Hernandez MD  Collected:           09/23/2024 02:05 PM           Ordering Location:     [facility]      Received:            09/24/2024 06:55 AM                                  MAMMOGRAPHY                                                                   Pathologist:           Michelle Urena DO                                                       Specimens:   1) - Breast, Right, RT BREAST U/S BX/1200, 4CMFN                                                    2) - Breast, Right, RT BREAST U/S BX/1100,4CMFN                                            Clinical Information    Right breast subareolar mass   Final Diagnosis   1. Right breast,  12 o'clock position, 4 cm from nipple, ultrasound-guided core biopsy:               - Fibroadenomatoid change  - Columnar cell change   - Fibrosis        2. 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)  - Histologic grade (Niko Histologic Score):    - Glandular (Acinar)/Tubular Differentiation:  Score 3  - Nuclear Pleomorphism:  Score 1  - Mitotic Rate:  Score 1  - Overall Grade:  Grade 1               - Size of largest focus of invasion:  8 mm               - Breast biomarker testing:                            - Estrogen Receptor (ER):  Positive (95%, strong)                            - Progesterone Receptor  (PgR):  Positive (15%, strong)                            - HER2 (by immunohistochemistry):  Equivocal (Score 2+), see comment  - Ki-67:  12%               - Other findings:                            - 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 LUIS FERNANDO Hernandez at 16:11 on 9/25/2024 by Zia KOTHARI.           Comment:  HER2 FISH has been ordered.  See separate reference laboratory report for results.    Electronically signed by Michelle Urena DO on 9/25/2024 at 1641   Synoptic Checklist   Breast Biomarker Reporting Template   Protocol posted: 12/13/2023BREAST BIOMARKER REPORTING TEMPLATE - All Specimens  Test(s) Performed     Estrogen Receptor (ER) Status  Positive (greater than 10% of cells demonstrate nuclear positivity)   Percentage of Cells with Nuclear Positivity  95 %   Average Intensity of Staining  Strong   Test Type  Food and Drug Administration (FDA) cleared (test / vendor): Roche   Primary Antibody  SP1   Test(s) Performed     Progesterone Receptor (PgR) Status  Positive   Percentage of Cells with Nuclear Positivity  15 %   Average Intensity of Staining  Strong   Test Type  Food and Drug Administration (FDA) cleared (test / vendor): Roche   Primary Antibody  1E2   Test(s) Performed     HER2 by Immunohistochemistry  Equivocal (Score 2+)   Percentage of Cells with Uniform Intense Complete Membrane Staining  0 %   Test Type  Food and Drug Administration (FDA) cleared (test / vendor): Roche   Primary Antibody  CB11   Test(s) Performed  Ki-67   Ki-67 Percentage of Positive Nuclei  12 %   Cold Ischemia and Fixation Times  Meet requirements specified in latest version of the ASCO / CAP Guidelines          No radiology results for the last day  US Guided Breast Biopsy With & Without Device initial    Addendum Date: 9/26/2024    Final  surgical pathology report describes fibroadenomatoid change, columnar cell change, and fibrosis at site A, 12:00.  Invasive carcinoma was evident at site B.  Given the density and nodularity of the breast, I would recommend MR examination for further evaluation.  Surgical consultation is recommended. Consider referral to the multidisciplinary clinic at the cancer center.  Electronically Signed ByGENTRY SHAFER MD On:9/26/2024 9:47 AM      Result Date: 9/26/2024  Impression: Successful ultrasound guided core biopsy of the right breast, 2 sites. The patient tolerated the procedure well without immediate complication. Specimens have been sent to pathology for further analysis.  Addendum will be dictated upon receiving final pathology for concordance and recommendations.   Electronically Signed ByGENTRY SHAFER MD On:9/23/2024 2:34 PM      US Guided Breast Biopsy With & Without Device Each Additional    Addendum Date: 9/26/2024    Final surgical pathology report describes fibroadenomatoid change, columnar cell change, and fibrosis at site A, 12:00.  Invasive carcinoma was evident at site B.  Given the density and nodularity of the breast, I would recommend MR examination for further evaluation.  Surgical consultation is recommended. Consider referral to the multidisciplinary clinic at the cancer center.  Electronically Signed ByGENTRY SHAFER MD On:9/26/2024 9:47 AM      Result Date: 9/26/2024  Impression: Successful ultrasound guided core biopsy of the right breast, 2 sites. The patient tolerated the procedure well without immediate complication. Specimens have been sent to pathology for further analysis.  Addendum will be dictated upon receiving final pathology for concordance and recommendations.   Electronically Signed ByGENTRY SHAFER MD On:9/23/2024 2:34 PM      Mammo Post Device Placement Right    Result Date: 9/23/2024  Impression: Diagnostic right mammogram following ultrasound-guided vacuum-assisted core  needle biopsy of 2 sites demonstrating localization clips in satisfactory position.    Note: It has been reported that there is approximately a 15% false negative in mammography. Therefore, management of a palpable abnormality should not be deferred because of a negative mammogram.    Electronically Signed By-SUSANNA SHAFER MD On:9/23/2024 2:13 PM       Assessment & Plan     Diagnoses and all orders for this visit:    1. Invasive carcinoma of breast (Primary)  -     CBC & Differential; Future  -     Comprehensive Metabolic Panel; Future  -     CBC & Differential; Future  -     Comprehensive Metabolic Panel; Future    2. Vitamin D deficiency  -     Vitamin D,25-Hydroxy; Future         Deanne Pittman is a 79 y.o. female who presents to [facility] HEMATOLOGY [facility] ONCOLOGY for Right HR + Breast cancer, Invasive carcinoma no special type (ductal), grade 1, ER +95%, CT +50%, HER2 equivocal by IHC 2+, HER2 FISH was negative, cT1,cN0Mx    -Reviewed mammogram, breast biopsy results with patient today and discussed diagnosis with patient.  -Patient is pending plan for mastectomy in the coming weeks with Dr. Schwartz  - Discussed plan to obtain Oncotype DX score report determine she requires adjuvant chemotherapy  - Shared with her at a minimum she require 5 years of endocrine therapy with anastrozole       Family history of cancer  - Patient with 2 sisters with breast cancer diagnosed in the 40s, 1 sister also had rectal cancer  - Thus ordered [facility] genetic testing today.    Discussed plan to have patient follow-up in 6 to 8 weeks after surgery to discuss next steps    Please note that portions of this note were completed with a voice recognition program.    Electronically signed by Darby Cole MD, 10/03/24, 3:09 PM EDT.    Assessment & Plan      Follow Up     I spent 60 minutes caring for Deanne on this date of service. This time includes time spent by me in the following activities:preparing for the  visit, reviewing tests, obtaining and/or reviewing a separately obtained history, performing a medically appropriate examination and/or evaluation , counseling and educating the patient/family/caregiver, ordering medications, tests, or procedures, referring and communicating with other health care professionals , documenting information in the medical record, independently interpreting results and communicating that information with the patient/family/caregiver, and care coordination    Any chemotherapy or immunotherapy or other systemic therapy treatment plan involves a high risk of complications and/or mortality of patient management.    The patient was seen and examined. Work by the provider also included review and/or ordering of lab tests, review and/or ordering of radiology tests, review and/or ordering of medicine tests, discussion with other physicians or providers, independent review of data, obtaining old records, review/summation of old records, and/or other review.    I have reviewed the family history, social history, and past medical history for this patient. Previous information and data has been reviewed and updated as needed. I have reviewed and verified the chief complaint, history, and other documentation. The patient was interviewed and examined in the clinic and the chart reviewed. The previous observations, recommendations, and conclusions were reviewed including those of other providers.     The plan was discussed with the patient and/or family. The patient was given time to ask questions and these questions were answered. At the conclusion of their visit they had no additional questions or concerns and all questions were answered to their satisfaction.    Patient was given instructions and counseling regarding her condition or for health maintenance advice. Please see specific information pulled into the AVS if appropriate.

## 2024-10-10 ENCOUNTER — TELEPHONE (OUTPATIENT)
Dept: OCCUPATIONAL THERAPY | Facility: HOSPITAL | Age: 79
End: 2024-10-10
Payer: MEDICARE

## 2024-10-10 DIAGNOSIS — Z01.818 ENCOUNTER FOR PRE-OPERATIVE EXAMINATION: ICD-10-CM

## 2024-10-10 DIAGNOSIS — C50.911 MALIGNANT NEOPLASM OF RIGHT BREAST IN FEMALE, ESTROGEN RECEPTOR POSITIVE, UNSPECIFIED SITE OF BREAST: ICD-10-CM

## 2024-10-10 DIAGNOSIS — Z17.0 MALIGNANT NEOPLASM OF RIGHT BREAST IN FEMALE, ESTROGEN RECEPTOR POSITIVE, UNSPECIFIED SITE OF BREAST: ICD-10-CM

## 2024-10-10 DIAGNOSIS — Z91.89 AT RISK FOR LYMPHEDEMA: Primary | ICD-10-CM

## 2024-10-17 ENCOUNTER — HOSPITAL ENCOUNTER (OUTPATIENT)
Dept: OCCUPATIONAL THERAPY | Facility: HOSPITAL | Age: 79
Setting detail: THERAPIES SERIES
Discharge: HOME | End: 2024-10-17
Payer: MEDICARE

## 2024-10-17 DIAGNOSIS — Z91.89 AT RISK FOR LYMPHEDEMA: Primary | ICD-10-CM

## 2024-10-17 DIAGNOSIS — Z01.818 ENCOUNTER FOR PREOPERATIVE ASSESSMENT: ICD-10-CM

## 2024-10-17 DIAGNOSIS — C50.411 MALIGNANT NEOPLASM OF UPPER-OUTER QUADRANT OF RIGHT BREAST IN FEMALE, ESTROGEN RECEPTOR POSITIVE: ICD-10-CM

## 2024-10-17 DIAGNOSIS — Z17.0 MALIGNANT NEOPLASM OF UPPER-OUTER QUADRANT OF RIGHT BREAST IN FEMALE, ESTROGEN RECEPTOR POSITIVE: ICD-10-CM

## 2024-10-17 PROCEDURE — 93702 BIS XTRACELL FLUID ANALYSIS: CPT | Performed by: OCCUPATIONAL THERAPIST

## 2024-10-17 PROCEDURE — 97165 OT EVAL LOW COMPLEX 30 MIN: CPT | Performed by: OCCUPATIONAL THERAPIST

## 2024-10-17 NOTE — THERAPY EVALUATION
Outpatient Occupational Therapy Lymphedema Initial Evaluation  MARJORIE Peres     Patient Name: Deanne Pittman  : 1945  MRN: [id number]  Today's Date: 10/17/2024      Visit Date: 10/17/2024    Patient Active Problem List   Diagnosis    Cardiomyopathy    Chronic venous insufficiency    Hyperlipidemia    Essential (primary) hypertension    Stage III chronic kidney disease    Renal cyst    Malignant neoplasm of right breast in female, estrogen receptor positive        Past Medical History:   Diagnosis Date    Cardiomyopathy     Hyperlipidemia     Renal cyst     right        Past Surgical History:   Procedure Laterality Date    BREAST SURGERY Left     lump removed    COLON SURGERY      LIVER SURGERY      exploritory surgery due to puncture to liver     SUBTOTAL HYSTERECTOMY           Visit Dx:     ICD-10-CM ICD-9-CM   1. At risk for lymphedema  Z91.89 V49.89   2. Encounter for preoperative assessment  Z01.818 V72.84   3. Malignant neoplasm of upper-outer quadrant of right breast in female, estrogen receptor positive  C50.411 174.4    Z17.0 V86.0        Patient History       Row Name 10/17/24 0800             History    Chief Complaint --  Lymphedema surveillance program  -TD      Brief Description of Current Complaint Deanne is a 79-year-old female with a recent diagnosis of invasive ductal carcinoma grade 1 ER/MT positive of the right breast.  Patient will undergo right-sided mastectomy on 2024  -TD         Fall Risk Assessment    Any falls in the past year: No  -TD      Does patient have a fear of falling No  -TD         Services    Are you currently receiving Home Health services No  -TD      Do you plan to receive Home Health services in the near future No  -TD         Daily Activities    Primary Language English  -TD      Are you able to read Yes  -TD      Are you able to write Yes  -TD      How does patient learn best? Listening;Reading;Demonstration;Pictures/Video  -TD         Safety    Are you being  "hurt, hit, or frightened by anyone at home or in your life? No  -TD      Are you being neglected by a caregiver No  -TD      Have you had any of the following issues with N/A  -TD                User Key  (r) = Recorded By, (t) = Taken By, (c) = Cosigned By      Initials Name Provider Type    TD Lauren Peacock OT Occupational Therapist                     Lymphedema       Row Name 10/17/24 0800             Subjective Pain    Able to rate subjective pain? yes  -TD      Pre-Treatment Pain Level 0  -TD      Post-Treatment Pain Level 0  -TD         Subjective    Subjective Comments Pt is ready for surgery  -TD         Lymphedema Assessment    Lymphedema Classification RUE:;at risk/stage 0  -TD      Lymphedema Cancer Related Sx right;simple mastectomy;sentinel node biopsy  -TD      Lymphedema Surgery Comments 11/5/2024  -TD         LLIS - Physical Concerns    The amount of pain associated with my lymphedema is: 0  -TD      The amount of limb heaviness associated with my lymphedema is: 0  -TD      The amount of skin tightness associated with my lymphedema is: 0  -TD      The size of my swollen limb(s) seems: 0  -TD      Lymphedema affects the movement of my swollen limb(s): 0  -TD      The strength in my swollen limb(s) is: 0  -TD         LLIS - Psychosocial Concerns    Lymphedema affects my body image (i.e., \"how I think I look\"). 0  -TD      Lymphedema affects my socializing with others. 0  -TD      Lymphedema affects my intimate relations with spouse or partner (rate 0 if not applicable 0  -TD      Lymphedema \"gets me down\" (i.e., depression, frustration, or anger) 0  -TD      I must rely on others for help due to my lymphedema. 0  -TD      I know what to do to manage my lymphedema 4  -TD         LLIS - Functional Concerns    Lymphedema affects my ability to perform self-care activities (i.e. eating, dressing, hygiene) 0  -TD      Lymphedema affects my ability to perform routine home or work-related activities. 0  -TD  "     Lymphedema affects my performance of preferred leisure activities. 0  -TD      Lymphedema affects proper fit of clothing/shoes 0  -TD      Lymphedema affects my sleep 0  -TD         Posture/Observations    Posture- WNL Posture is WNL  -TD         General ROM    GENERAL ROM COMMENTS BUE are WFL  -TD         MMT (Manual Muscle Testing)    General MMT Comments BUE are WFL  -TD         L-Dex Bioimpedence Screening    L-Dex Measurement Extremity RUE  -TD      L-Dex Patient Position Standing  -TD      L-Dex UE Dominate Side Right  -TD      L-Dex UE At Risk Side Right  -TD      L-Dex UE Pre Surgical Value Yes  -TD      L-Dex UE Score 5.6  -TD      L-Dex UE Baseline Score 5.6  -TD      L-Dex UE Value Change 0  -TD      L-Dex UE Comment baseline  -TD      $ L-Dex Charge yes  -TD         Lymphedema Life Impact Scale Totals    A.  Total Q1 - Q17 (Do not include Q18) 4  -TD      B.  Total number of questions answered (Q1-Q17) 17  -TD      C. Divide A by B 0.24  -TD      D. Multiple C by 25 6  -TD                User Key  (r) = Recorded By, (t) = Taken By, (c) = Cosigned By      Initials Name Provider Type    Lauren Adan OT Occupational Therapist                    The patient had a baseline SOZO measurement which I reviewed today. The score is   WFL, see scanned to EMR. Bioimpedance spectroscopy helps identify the   onset of lymphedema in an arm or leg before patients experience noticeable swelling. Research has   shown that 92% of patients with early detection of lymphedema using L-Dex combined with   intervention do not progress to chronic lymphedema through three years. Additionally, as of March 2023, the NCCN Guidelines® for Survivorship recommend proactive screening for lymphedema using   bioimpedance spectroscopy. Whenever possible, patients are tested for baseline L-Dex score before   cancer treatment begins and then are reassessed during regular follow-up visits using the SOZO device.   Otherwise, this can  be started postoperatively and continued during regular follow-up visits. If the   patient’s L-Dex score increases above normal levels, that is a sign that lymphedema is developing and a   referral is made to physical therapy for further evaluation and early compression treatment.   Lymphedema assessment with the SOZO L-Dex score is recommended to be done every 3 months for   the first 3 years and then every 6 months for years 4 and 5 followed by annually afterwards         Therapy Education  Education Details: Pt was educated on lymphedema and the expectations of the lymphedema clinic.  Given: HEP, Symptoms/condition management, Edema management, Pain management  Program: New  How Provided: Verbal, Demonstration, Written  Provided to: Patient, Other (comment)  Level of Understanding: Teach back education performed, Verbalized, Demonstrated         OT Goals       Row Name 10/17/24 1407          Time Calculation    OT Goal Re-Cert Due Date 11/16/24  -TD               User Key  (r) = Recorded By, (t) = Taken By, (c) = Cosigned By      Initials Name Provider Type    Lauren Adan OT Occupational Therapist                  1. Post Breast Surgery Care/at risk for Lymphedema  LTG 1: 90 days:  As an indicator of no exacerbation of lymphedema staging, the patient will present with an L-Dex score less than [10] points from preoperative baseline.   STATUS: New  STG 1a:   30 days: To prevent exacerbation of mixed edema to lymphedema, patient will utilize the 2 postsurgical compression garments daily.      STATUS: New  STG 1b: 30 days: Patient will be independent with self-manual lymphatic massage.    STATUS: New  STG 1c: 30 days:  Patient will be independent with identification of signs and symptoms of lymphedema exasperation per stoplight to recovery education handout.   STATUS: New  STG 1 d: 30 days: Patient will be independent with HEP to prevent advancement in lymphedema staging.   STATUS: New  TREATMENT:  Self  Care/ADL retraining, Therapeutic Activity, Neuromuscular Re-education, Therapeutic Exercise, Bioimpedence Fluid Analysis, Post-Surgical compression garement [id number] [facility] Rosanna Camisole Kit [id number], Orthotic Management and training,  and Manual Therapy.     OT Assessment/Plan       Row Name 10/17/24 0830          OT Assessment    Functional Limitations Limitations in functional capacity and performance  -TD     Impairments Impaired lymphatic circulation  -TD     Assessment Comments Deanne is a 79-year-old female with a recent diagnosis of invasive ductal carcinoma grade 1 ER/IA positive of the right breast. Patient will undergo right-sided mastectomy on November 5, 2024.  Patient would benefit from continued skilled occupational therapy to prevent increased staging of lymphedema, increased pain, and decreased range of motion.  -TD     OT Diagnosis At risk for lymphedema  -TD     OT Rehab Potential Good  -TD     Patient/caregiver participated in establishment of treatment plan and goals Yes  -TD     Patient would benefit from skilled therapy intervention Yes  -TD        OT Plan    OT Frequency --  See duration  -TD     Predicted Duration of Therapy Intervention (OT) Patient will be seen preop, 3 weeks postop, 3 weeks post XRT, every 3 months years 1-3, and every 6 months years 4 and 5.  -TD     Planned CPT's? OT EVAL LOW COMPLEXITY: 43701;OT RE-EVAL: 77757;OT THER ACT EA 15 MIN: 11990IL;OT THER PROC EA 15 MIN: 32787AO;OT SELF CARE/MGMT/TRAIN 15 MIN: 05347;OT MANUAL THERAPY EA 15 MIN: 31936;OT BIS XTRACELL FLUID ANALYSIS: 27756;OT CARE PLAN EA 15 MIN;OT ORTHOTIC MGMT/TRAIN EA 15 MIN: 02276;OT ORTHO/PROSTHET CHECKOUT EA 15 MIN: 07134  -TD     Planned Therapy Interventions (Optional Details) home exercise program;postural re-education;prosthetic fitting/training;patient/family education;orthotic fitting/training;ROM (Range of Motion);strengthening;stretching;manual therapy techniques  -TD     OT Plan Comments  Initiate plan of care  -TD               User Key  (r) = Recorded By, (t) = Taken By, (c) = Cosigned By      Initials Name Provider Type    TD Lauren Peacock OT Occupational Therapist                  OT judith complexity:   Examination:   Performance Deficits include:  dressing, bathing, grooming   # deficits affecting performance:  3  Decision Making:   Treatment Options Considered : adaption, remediation, prevention  # Treatment options considered : 3  Modification Made for: environment, task   Level of Task Modification: min/mod  Comorbidity Affect Performance: none  How is performance affected: n/a  Evaluation Level Determined:  low               Time Calculation:   Untimed Charges  OT Eval/Re-eval Minutes: 55  Total Minutes  Untimed Charges Total Minutes: 55   Total Minutes: 55     Therapy Charges for Today       Code Description Service Date Service Provider Modifiers Qty    14262683539 HC PT BIS XTRACELL FLUID ANALYSIS 10/17/2024 Lauren Peacock OT  1    41479028224 HC OT EVAL LOW COMPLEXITY 4 10/17/2024 Lauren Peacock OT GO 1                      Lauren Peacock OT  10/17/2024

## 2024-10-20 DIAGNOSIS — E78.2 MIXED HYPERLIPIDEMIA: ICD-10-CM

## 2024-10-21 RX ORDER — ATORVASTATIN CALCIUM 20 MG/1
20 TABLET, FILM COATED ORAL
Qty: 90 TABLET | Refills: 0 | Status: SHIPPED | OUTPATIENT
Start: 2024-10-21

## 2024-11-01 ENCOUNTER — ANESTHESIA EVENT (OUTPATIENT)
Dept: PERIOP | Facility: HOSPITAL | Age: 79
End: 2024-11-01
Payer: MEDICARE

## 2024-11-05 ENCOUNTER — PATIENT OUTREACH (OUTPATIENT)
Dept: ONCOLOGY | Facility: HOSPITAL | Age: 79
End: 2024-11-05
Payer: MEDICARE

## 2024-11-05 ENCOUNTER — ANESTHESIA (OUTPATIENT)
Dept: PERIOP | Facility: HOSPITAL | Age: 79
End: 2024-11-05
Payer: MEDICARE

## 2024-11-05 ENCOUNTER — HOSPITAL ENCOUNTER (OUTPATIENT)
Dept: NUCLEAR MEDICINE | Facility: HOSPITAL | Age: 79
Discharge: HOME | End: 2024-11-05
Payer: MEDICARE

## 2024-11-05 ENCOUNTER — HOSPITAL ENCOUNTER (OUTPATIENT)
Facility: HOSPITAL | Age: 79
Discharge: HOME | End: 2024-11-06
Attending: SURGERY | Admitting: SURGERY
Payer: MEDICARE

## 2024-11-05 DIAGNOSIS — C50.911 MALIGNANT NEOPLASM OF RIGHT BREAST IN FEMALE, ESTROGEN RECEPTOR POSITIVE, UNSPECIFIED SITE OF BREAST: ICD-10-CM

## 2024-11-05 DIAGNOSIS — Z17.0 MALIGNANT NEOPLASM OF RIGHT BREAST IN FEMALE, ESTROGEN RECEPTOR POSITIVE, UNSPECIFIED SITE OF BREAST: ICD-10-CM

## 2024-11-05 DIAGNOSIS — Z90.11 S/P RIGHT MASTECTOMY: ICD-10-CM

## 2024-11-05 DIAGNOSIS — Z91.89 AT RISK FOR LYMPHEDEMA: Primary | ICD-10-CM

## 2024-11-05 PROBLEM — C50.919 BREAST CANCER: Status: ACTIVE | Noted: 2024-11-05

## 2024-11-05 LAB
GLUCOSE BLDC GLUCOMTR-MCNC: 123 MG/DL (ref 70–99)
GLUCOSE BLDC GLUCOMTR-MCNC: 136 MG/DL (ref 70–99)
QT INTERVAL: 416 MS
QTC INTERVAL: 469 MS

## 2024-11-05 PROCEDURE — 63710000001 OXYCODONE 5 MG TABLET: Performed by: MARRIAGE & FAMILY THERAPIST

## 2024-11-05 PROCEDURE — A9270 NON-COVERED ITEM OR SERVICE: HCPCS | Performed by: SURGERY

## 2024-11-05 PROCEDURE — S0260 H&P FOR SURGERY: HCPCS | Performed by: SURGERY

## 2024-11-05 PROCEDURE — 88342 IMHCHEM/IMCYTCHM 1ST ANTB: CPT | Performed by: SURGERY

## 2024-11-05 PROCEDURE — A9270 NON-COVERED ITEM OR SERVICE: HCPCS | Performed by: MARRIAGE & FAMILY THERAPIST

## 2024-11-05 PROCEDURE — 25010000002 DEXAMETHASONE PER 1 MG: Performed by: MARRIAGE & FAMILY THERAPIST

## 2024-11-05 PROCEDURE — 25010000002 HYDROMORPHONE 1 MG/ML SOLUTION: Performed by: MARRIAGE & FAMILY THERAPIST

## 2024-11-05 PROCEDURE — 25010000002 CEFAZOLIN PER 500 MG: Performed by: SURGERY

## 2024-11-05 PROCEDURE — 19303 MAST SIMPLE COMPLETE: CPT | Performed by: SURGERY

## 2024-11-05 PROCEDURE — 63710000001 HYDROCODONE-ACETAMINOPHEN 5-325 MG TABLET: Performed by: SURGERY

## 2024-11-05 PROCEDURE — 93005 ELECTROCARDIOGRAM TRACING: CPT | Performed by: ANESTHESIOLOGY

## 2024-11-05 PROCEDURE — 25010000002 ONDANSETRON PER 1 MG: Performed by: MARRIAGE & FAMILY THERAPIST

## 2024-11-05 PROCEDURE — 25010000002 FENTANYL CITRATE (PF) 50 MCG/ML SOLUTION: Performed by: MARRIAGE & FAMILY THERAPIST

## 2024-11-05 PROCEDURE — 82948 REAGENT STRIP/BLOOD GLUCOSE: CPT | Performed by: MARRIAGE & FAMILY THERAPIST

## 2024-11-05 PROCEDURE — 63710000001 ATORVASTATIN 20 MG TABLET: Performed by: SURGERY

## 2024-11-05 PROCEDURE — 94761 N-INVAS EAR/PLS OXIMETRY MLT: CPT

## 2024-11-05 PROCEDURE — 25010000002 LIDOCAINE PF 2% 2 % SOLUTION: Performed by: MARRIAGE & FAMILY THERAPIST

## 2024-11-05 PROCEDURE — 25010000002 PROPOFOL 10 MG/ML EMULSION: Performed by: MARRIAGE & FAMILY THERAPIST

## 2024-11-05 PROCEDURE — 38525 BIOPSY/REMOVAL LYMPH NODES: CPT | Performed by: SURGERY

## 2024-11-05 PROCEDURE — 88307 TISSUE EXAM BY PATHOLOGIST: CPT | Performed by: SURGERY

## 2024-11-05 PROCEDURE — 38792 RA TRACER ID OF SENTINL NODE: CPT

## 2024-11-05 PROCEDURE — 82948 REAGENT STRIP/BLOOD GLUCOSE: CPT

## 2024-11-05 PROCEDURE — 0 TECHETIUM TC99M TILMANOCEPT: Performed by: SURGERY

## 2024-11-05 PROCEDURE — 94799 UNLISTED PULMONARY SVC/PX: CPT

## 2024-11-05 PROCEDURE — 63710000001 LOSARTAN 25 MG TABLET: Performed by: SURGERY

## 2024-11-05 PROCEDURE — 25010000002 GENTAMICIN PER 80 MG: Performed by: SURGERY

## 2024-11-05 PROCEDURE — 25810000003 LACTATED RINGERS PER 1000 ML: Performed by: ANESTHESIOLOGY

## 2024-11-05 PROCEDURE — A9520 TC99 TILMANOCEPT DIAG 0.5MCI: HCPCS | Performed by: SURGERY

## 2024-11-05 PROCEDURE — 63710000001 CARVEDILOL 6.25 MG TABLET: Performed by: SURGERY

## 2024-11-05 PROCEDURE — 88331 PATH CONSLTJ SURG 1 BLK 1SPC: CPT | Performed by: SURGERY

## 2024-11-05 DEVICE — LIGACLIP MCA MULTIPLE CLIP APPLIERS, 20 MEDIUM CLIPS
Type: IMPLANTABLE DEVICE | Site: BREAST | Status: FUNCTIONAL
Brand: LIGACLIP

## 2024-11-05 RX ORDER — SODIUM CHLORIDE, SODIUM LACTATE, POTASSIUM CHLORIDE, CALCIUM CHLORIDE 600; 310; 30; 20 MG/100ML; MG/100ML; MG/100ML; MG/100ML
9 INJECTION, SOLUTION INTRAVENOUS CONTINUOUS PRN
Status: DISCONTINUED | OUTPATIENT
Start: 2024-11-05 | End: 2024-11-06

## 2024-11-05 RX ORDER — KETAMINE HCL IN NACL, ISO-OSM 100MG/10ML
SYRINGE (ML) INJECTION AS NEEDED
Status: DISCONTINUED | OUTPATIENT
Start: 2024-11-05 | End: 2024-11-05 | Stop reason: SURG

## 2024-11-05 RX ORDER — PROMETHAZINE HYDROCHLORIDE 12.5 MG/1
25 TABLET ORAL ONCE AS NEEDED
Status: DISCONTINUED | OUTPATIENT
Start: 2024-11-05 | End: 2024-11-05 | Stop reason: HOSPADM

## 2024-11-05 RX ORDER — ACETAMINOPHEN 500 MG
1000 TABLET ORAL ONCE
Status: COMPLETED | OUTPATIENT
Start: 2024-11-05 | End: 2024-11-05

## 2024-11-05 RX ORDER — DEXTROSE MONOHYDRATE AND SODIUM CHLORIDE 5; .45 G/100ML; G/100ML
75 INJECTION, SOLUTION INTRAVENOUS CONTINUOUS
Status: DISCONTINUED | OUTPATIENT
Start: 2024-11-05 | End: 2024-11-06 | Stop reason: SDUPTHER

## 2024-11-05 RX ORDER — OXYCODONE HYDROCHLORIDE 5 MG/1
5 TABLET ORAL
Status: DISCONTINUED | OUTPATIENT
Start: 2024-11-05 | End: 2024-11-05 | Stop reason: HOSPADM

## 2024-11-05 RX ORDER — FENTANYL CITRATE 50 UG/ML
INJECTION, SOLUTION INTRAMUSCULAR; INTRAVENOUS AS NEEDED
Status: DISCONTINUED | OUTPATIENT
Start: 2024-11-05 | End: 2024-11-05 | Stop reason: SURG

## 2024-11-05 RX ORDER — LOSARTAN POTASSIUM 25 MG/1
25 TABLET ORAL DAILY
Status: DISCONTINUED | OUTPATIENT
Start: 2024-11-05 | End: 2024-11-06 | Stop reason: HOSPADM

## 2024-11-05 RX ORDER — PANTOPRAZOLE SODIUM 40 MG/1
40 TABLET, DELAYED RELEASE ORAL
Status: DISCONTINUED | OUTPATIENT
Start: 2024-11-06 | End: 2024-11-06 | Stop reason: HOSPADM

## 2024-11-05 RX ORDER — HYDROCODONE BITARTRATE AND ACETAMINOPHEN 5; 325 MG/1; MG/1
1 TABLET ORAL EVERY 4 HOURS PRN
Status: DISCONTINUED | OUTPATIENT
Start: 2024-11-05 | End: 2024-11-06 | Stop reason: HOSPADM

## 2024-11-05 RX ORDER — NALOXONE HCL 0.4 MG/ML
0.4 VIAL (ML) INJECTION
Status: DISCONTINUED | OUTPATIENT
Start: 2024-11-05 | End: 2024-11-06 | Stop reason: HOSPADM

## 2024-11-05 RX ORDER — PROPOFOL 10 MG/ML
VIAL (ML) INTRAVENOUS AS NEEDED
Status: DISCONTINUED | OUTPATIENT
Start: 2024-11-05 | End: 2024-11-05 | Stop reason: SURG

## 2024-11-05 RX ORDER — MEPERIDINE HYDROCHLORIDE 25 MG/ML
12.5 INJECTION INTRAMUSCULAR; INTRAVENOUS; SUBCUTANEOUS
Status: DISCONTINUED | OUTPATIENT
Start: 2024-11-05 | End: 2024-11-05 | Stop reason: HOSPADM

## 2024-11-05 RX ORDER — ONDANSETRON 2 MG/ML
4 INJECTION INTRAMUSCULAR; INTRAVENOUS ONCE AS NEEDED
Status: DISCONTINUED | OUTPATIENT
Start: 2024-11-05 | End: 2024-11-05 | Stop reason: HOSPADM

## 2024-11-05 RX ORDER — ONDANSETRON 2 MG/ML
4 INJECTION INTRAMUSCULAR; INTRAVENOUS EVERY 6 HOURS PRN
Status: DISCONTINUED | OUTPATIENT
Start: 2024-11-05 | End: 2024-11-06 | Stop reason: HOSPADM

## 2024-11-05 RX ORDER — ONDANSETRON 4 MG/1
4 TABLET, ORALLY DISINTEGRATING ORAL EVERY 6 HOURS PRN
Status: DISCONTINUED | OUTPATIENT
Start: 2024-11-05 | End: 2024-11-06 | Stop reason: HOSPADM

## 2024-11-05 RX ORDER — DEXAMETHASONE SODIUM PHOSPHATE 4 MG/ML
INJECTION, SOLUTION INTRA-ARTICULAR; INTRALESIONAL; INTRAMUSCULAR; INTRAVENOUS; SOFT TISSUE AS NEEDED
Status: DISCONTINUED | OUTPATIENT
Start: 2024-11-05 | End: 2024-11-05 | Stop reason: SURG

## 2024-11-05 RX ORDER — MORPHINE SULFATE 2 MG/ML
2 INJECTION, SOLUTION INTRAMUSCULAR; INTRAVENOUS
Status: DISCONTINUED | OUTPATIENT
Start: 2024-11-05 | End: 2024-11-06 | Stop reason: HOSPADM

## 2024-11-05 RX ORDER — ATORVASTATIN CALCIUM 20 MG/1
20 TABLET, FILM COATED ORAL DAILY
Status: DISCONTINUED | OUTPATIENT
Start: 2024-11-05 | End: 2024-11-06 | Stop reason: HOSPADM

## 2024-11-05 RX ORDER — CARVEDILOL 6.25 MG/1
6.25 TABLET ORAL 2 TIMES DAILY WITH MEALS
Status: DISCONTINUED | OUTPATIENT
Start: 2024-11-05 | End: 2024-11-06 | Stop reason: HOSPADM

## 2024-11-05 RX ORDER — ONDANSETRON 2 MG/ML
INJECTION INTRAMUSCULAR; INTRAVENOUS AS NEEDED
Status: DISCONTINUED | OUTPATIENT
Start: 2024-11-05 | End: 2024-11-05 | Stop reason: SURG

## 2024-11-05 RX ORDER — PROMETHAZINE HYDROCHLORIDE 25 MG/1
25 SUPPOSITORY RECTAL ONCE AS NEEDED
Status: DISCONTINUED | OUTPATIENT
Start: 2024-11-05 | End: 2024-11-05 | Stop reason: HOSPADM

## 2024-11-05 RX ORDER — LIDOCAINE HYDROCHLORIDE 20 MG/ML
INJECTION, SOLUTION EPIDURAL; INFILTRATION; INTRACAUDAL; PERINEURAL AS NEEDED
Status: DISCONTINUED | OUTPATIENT
Start: 2024-11-05 | End: 2024-11-05 | Stop reason: SURG

## 2024-11-05 RX ADMIN — DEXAMETHASONE SODIUM PHOSPHATE 4 MG: 4 INJECTION, SOLUTION INTRAMUSCULAR; INTRAVENOUS at 16:28

## 2024-11-05 RX ADMIN — HYDROCODONE BITARTRATE AND ACETAMINOPHEN 1 TABLET: 5; 325 TABLET ORAL at 20:23

## 2024-11-05 RX ADMIN — LIDOCAINE HYDROCHLORIDE 50 MG: 20 INJECTION, SOLUTION INTRAVENOUS at 16:28

## 2024-11-05 RX ADMIN — FENTANYL CITRATE 25 MCG: 50 INJECTION, SOLUTION INTRAMUSCULAR; INTRAVENOUS at 16:28

## 2024-11-05 RX ADMIN — Medication 25 MG: at 17:02

## 2024-11-05 RX ADMIN — TILMANOCEPT 1 DOSE: KIT at 12:05

## 2024-11-05 RX ADMIN — PROPOFOL 150 MG: 10 INJECTION, EMULSION INTRAVENOUS at 16:28

## 2024-11-05 RX ADMIN — ATORVASTATIN CALCIUM 20 MG: 20 TABLET, FILM COATED ORAL at 20:23

## 2024-11-05 RX ADMIN — ACETAMINOPHEN 1000 MG: 500 TABLET ORAL at 15:08

## 2024-11-05 RX ADMIN — OXYCODONE HYDROCHLORIDE 5 MG: 5 TABLET ORAL at 18:22

## 2024-11-05 RX ADMIN — CARVEDILOL 6.25 MG: 6.25 TABLET, FILM COATED ORAL at 20:23

## 2024-11-05 RX ADMIN — FENTANYL CITRATE 25 MCG: 50 INJECTION, SOLUTION INTRAMUSCULAR; INTRAVENOUS at 16:57

## 2024-11-05 RX ADMIN — DEXTROSE AND SODIUM CHLORIDE 75 ML/HR: 5; 450 INJECTION, SOLUTION INTRAVENOUS at 20:24

## 2024-11-05 RX ADMIN — HYDROMORPHONE HYDROCHLORIDE 0.5 MG: 1 INJECTION, SOLUTION INTRAMUSCULAR; INTRAVENOUS; SUBCUTANEOUS at 16:51

## 2024-11-05 RX ADMIN — Medication 25 MG: at 16:51

## 2024-11-05 RX ADMIN — SODIUM CHLORIDE, POTASSIUM CHLORIDE, SODIUM LACTATE AND CALCIUM CHLORIDE 9 ML/HR: 600; 310; 30; 20 INJECTION, SOLUTION INTRAVENOUS at 15:08

## 2024-11-05 RX ADMIN — ONDANSETRON HYDROCHLORIDE 4 MG: 2 SOLUTION INTRAMUSCULAR; INTRAVENOUS at 17:30

## 2024-11-05 RX ADMIN — SODIUM CHLORIDE 2000 MG: 9 INJECTION, SOLUTION INTRAVENOUS at 16:32

## 2024-11-05 RX ADMIN — LOSARTAN POTASSIUM 25 MG: 25 TABLET, FILM COATED ORAL at 20:23

## 2024-11-05 RX ADMIN — FENTANYL CITRATE 50 MCG: 50 INJECTION, SOLUTION INTRAMUSCULAR; INTRAVENOUS at 16:34

## 2024-11-05 NOTE — ANESTHESIA PREPROCEDURE EVALUATION
Anesthesia Evaluation     Patient summary reviewed and Nursing notes reviewed   no history of anesthetic complications:   NPO Solid Status: > 8 hours  NPO Liquid Status: > 2 hours           Airway   Mallampati: II  TM distance: >3 FB  Neck ROM: full  No difficulty expected  Dental - normal exam     Pulmonary - negative pulmonary ROS and normal exam    breath sounds clear to auscultation  Cardiovascular - normal exam  Exercise tolerance: good (4-7 METS)    Patient on routine beta blocker and Beta blocker given within 24 hours of surgery  Rhythm: regular  Rate: normal    (+) hypertension, hyperlipidemia      Neuro/Psych- negative ROS  GI/Hepatic/Renal/Endo    (+) renal disease- CRI, diabetes mellitus type 2 well controlled    Musculoskeletal (-) negative ROS    Abdominal  - normal exam   Substance History - negative use     OB/GYN negative ob/gyn ROS         Other      history of cancer    ROS/Med Hx Other: >4METS.HX CARDIOMYOPATHY,DM,CKD3,ECHO 2019 EF 55%,NO VALVE STENOSIS. FOLLOWS PCP, LAST OV 7/25/24 F/U 6MTHS. DENIES C/P, SOA. KT                     Anesthesia Plan    ASA 2     general     (Patient understands anesthesia not responsible for dental damage.)  intravenous induction     Anesthetic plan, risks, benefits, and alternatives have been provided, discussed and informed consent has been obtained with: patient.    Use of blood products discussed with patient .    Plan discussed with CRNA.        CODE STATUS:

## 2024-11-05 NOTE — H&P
Chief Complaint: No chief complaint on file.    Subjective         History of Present Illness  Deanne Pittman is a 79 y.o. female presents to [facility] OR to be seen for right breast cancer.  Her imaging and pathology are shown below:    Clinical Information    Right breast subareolar mass   Final Diagnosis   1. Right breast,  12 o'clock position, 4 cm from nipple, ultrasound-guided core biopsy:               - Fibroadenomatoid change  - Columnar cell change   - Fibrosis        2. 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)  - Histologic grade (Whiting Histologic Score):    - Glandular (Acinar)/Tubular Differentiation:  Score 3  - Nuclear Pleomorphism:  Score 1  - Mitotic Rate:  Score 1  - Overall Grade:  Grade 1               - Size of largest focus of invasion:  8 mm               - Breast biomarker testing:                            - Estrogen Receptor (ER):  Positive (95%, strong)                            - Progesterone Receptor (PgR):  Positive (15%, strong)                            - HER2 (by immunohistochemistry):  Equivocal (Score 2+), see comment  - Ki-67:  12%               - Other findings:                            - 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     Electronically Signed By-Paula Cruz MD On:9/3/2024 10:07 AM      Addended by Paula Cruz MD on 9/3/2024 10:07 AM     Study Result    Narrative & Impression   CURRENT COMPLAINT SYMPTOMS: Abnormal right mammogram; R92.8-Other  abnormal and inconclusive findings on diagnostic imaging of breast     FILMS COMPARED: Screening mammogram with tomosynthesis 7/26/2024,  7/7/2023, mammogram without tomosynthesis 7/5/2022, 6/29/2021     Right BREAST DIGITAL DIAGNOSTIC MAMMOGRAM WITH TOMOSYNTHESIS     The following views were  obtained right CC and MLO spot compression  2D/3D views and ML view.This examination was reviewed with the aid of R2  aided detection.     Breast Density: The breasts are heterogeneously dense, which may obscure  small masses.     Mammogram findings: There is architectural distortion in the upper outer  right breast posterior depth. It measures about 1.6 cm.     Right BREAST REALTIME RIGHT BREAST ULTRASOUND  High resolution real-time ultrasound scanning was performed by the  ultrasound technologist. Still images were obtained by the ultrasound  technologist and submitted for radiologist review.     At 12:00 4 cm from the nipple there is a hypoechoic shadowing mass  located deep from skin surface and measures 6 x 6 x 6 mm. It has  irregular margins. It is located with dense tissue. This may account for  the architectural distortion. There is an incidental hypoechoic mass at  11:00 4 cm from the nipple measuring 5 x 4 x 6 mm cm. This was seen on  mammogram as well and has smooth margins and is wider than tall.     IMPRESSION:  Highly suspicious mass in the right breast measuring 6 mm on  mammogram likely corresponding to architectural distortion in the right  breast. Tissue diagnosis is recommended. Ultrasound-guided core biopsy  should be performed. Post procedure mammogram is recommended to make  sure that this finding corresponds the area of architectural distortion  at biopsy time. If it does not, stereotactic guided biopsy of the  architectural distortion will also be necessary. Recommend scheduling  patient same day for ultrasound-guided and stereotactic guided biopsy  right breast. In that instance, ultrasound-guided biopsy could be first  performed in stereotactic could be performed if additional procedure is  necessary.  There is an incidental smoothly marginated hypoechoic 6 mm mass at  11:00. Further management depends on the biopsy of the suspicious mass  at 12:00. Tissue diagnosis can be performed with  ultrasound-guided  biopsy.     The results were discussed with the patient by Dr Cruz at time of  study. Breast coordinator will contact the patient and physician  regarding need for right breast biopsy.   Ultrasound guided biopsy is recommended.        The patient was sent a notification.     BI-RADS Category 5: Highly suggestive of malignancy.       Both of her sisters had breast cancer in their 40s.      Objective     Past Medical History:   Diagnosis Date    Cancer     BREAST    Cardiomyopathy     PT STATES R/T TO PAST MEDS/NO ISSUES PER PATIENT, FOLLOWS W/PCP-[facility]    CKD (chronic kidney disease) stage 3, GFR 30-59 ml/min     FOLLOWS EDUARDO/ TICO GR    Diabetes mellitus     Hyperlipidemia     Renal cyst     right       Past Surgical History:   Procedure Laterality Date    BREAST SURGERY Left     lump removed    COLON SURGERY      COLOSTOMY AND REVERSAL    LIVER SURGERY      exploritory surgery due to puncture to liver     SUBTOTAL HYSTERECTOMY           Current Facility-Administered Medications:     acetaminophen (TYLENOL) tablet 1,000 mg, 1,000 mg, Oral, Once, Medina Cody MD    ceFAZolin 2000 mg IVPB in 100 mL NS (VTB), 2,000 mg, Intravenous, Once, Cristel Schwartz MD    lactated ringers infusion, 9 mL/hr, Intravenous, Continuous PRN, Medina Cody MD    Allergies   Allergen Reactions    Penicillins Unknown - Low Severity     Beta lactam allergy details  Antibiotic reaction: unknown  Age at reaction: adult  Dose to reaction time: days  Reason for antibiotic: unknown  Epinephrine required for reaction?: no  Tolerated antibiotics: unknown       Farxiga [Dapagliflozin] Headache        Family History   Problem Relation Age of Onset    Skin cancer Mother         skin carcinoma    Breast cancer Sister     Lupus Sister         lupus erythematosus    Breast cancer Sister     Rectal cancer Sister        Social History     Socioeconomic History    Marital status:    Tobacco Use    Smoking  "status: Former     Current packs/day: 0.00     Average packs/day: 0.5 packs/day for 45.0 years (22.5 ttl pk-yrs)     Types: Cigarettes     Start date:      Quit date: 2018     Years since quittin.8     Passive exposure: Past    Smokeless tobacco: Never   Vaping Use    Vaping status: Never Used   Substance and Sexual Activity    Alcohol use: Never    Drug use: Never    Sexual activity: Defer       Vital Signs:   /81 (BP Location: Right arm, Patient Position: Lying)   Pulse 71   Temp 97.7 °F (36.5 °C) (Temporal)   Resp 20   Ht 154.9 cm (61\")   Wt 56.7 kg (125 lb)   SpO2 98%   BMI 23.62 kg/m²    Review of Systems    Physical Exam  Vitals and nursing note reviewed.   Constitutional:       Appearance: Normal appearance.   HENT:      Head: Normocephalic and atraumatic.   Eyes:      Extraocular Movements: Extraocular movements intact.      Pupils: Pupils are equal, round, and reactive to light.   Cardiovascular:      Pulses: Normal pulses.   Pulmonary:      Effort: Pulmonary effort is normal. No accessory muscle usage or respiratory distress.   Chest:   Breasts:     Right: Normal. No inverted nipple, mass, nipple discharge or skin change.      Left: Normal. No inverted nipple, mass, nipple discharge or skin change.      Comments: Well healed right breast biopsy site  Abdominal:      General: Abdomen is flat.      Palpations: Abdomen is soft.      Tenderness: There is no abdominal tenderness. There is no guarding.   Musculoskeletal:         General: No swelling, tenderness or deformity.      Cervical back: Neck supple.   Lymphadenopathy:      Upper Body:      Right upper body: No supraclavicular or axillary adenopathy.      Left upper body: No supraclavicular or axillary adenopathy.   Skin:     General: Skin is warm and dry.   Neurological:      General: No focal deficit present.      Mental Status: She is alert and oriented to person, place, and time.   Psychiatric:         Mood and Affect: Mood " normal.         Thought Content: Thought content normal.          Result Review :               Assessment and Plan    Diagnoses and all orders for this visit:    1. Malignant neoplasm of right breast in female, estrogen receptor positive, unspecified site of breast  -     Follow Anesthesia Guidelines / Protocol; Standing  -     Verify / Perform Chlorhexidine Skin Prep; Standing  -     Place Sequential Compression Device; Standing  -     Maintain Sequential Compression Device; Standing  -     ceFAZolin 2000 mg IVPB in 100 mL NS (VTB)  -     Follow Anesthesia Guidelines / Protocol  -     Verify / Perform Chlorhexidine Skin Prep  -     Place Sequential Compression Device  -     Maintain Sequential Compression Device    Other orders  -     Follow Anesthesia Guidelines / Protocol; Standing  -     Cancel: Basic Metabolic Panel; Standing  -     ECG 12 Lead Pre-Op / Pre-Procedure; Standing  -     Follow Anesthesia Guidelines / Protocol; Standing  -     Cancel: CBC & Differential; Standing  -     ECG 12 Lead Pre-Op / Pre-Procedure  -     Follow Anesthesia Guidelines / Protocol  -     Cancel: Basic Metabolic Panel  -     Follow Anesthesia Guidelines / Protocol  -     Cancel: CBC & Differential  -     POC Glucose Finger Once; Standing  -     POC Glucose Finger Once  -     POC Glucose Once; Standing  -     POC Glucose Once  -     Vital Signs - Per Anesthesia Protocol; Standing  -     Remove Scopolamine Patch 24 Hours After Application; Standing  -     Continuous Pulse Oximetry; Standing  -     Insert Peripheral IV; Standing  -     lactated ringers infusion  -     acetaminophen (TYLENOL) tablet 1,000 mg  -     Remove Scopolamine Patch 24 Hours After Application  -     Continuous Pulse Oximetry  -     Insert Peripheral IV    Will check genetic testing and if positive will do bilateral mastectomy  but this time just right side    Follow Up   No follow-ups on file.  Patient was given instructions and counseling regarding her  condition or for health maintenance advice. Please see specific information pulled into the AVS if appropriate.         This document has been electronically signed by Cristel Schwartz MD  November 5, 2024 12:37 EST

## 2024-11-05 NOTE — OP NOTE
BREAST MASTECTOMY WITH SENTINEL NODE BIOPSY AND AXILLARY NODE DISSECTION  Procedure Report    Patient Name:  Deanne Pittman  YOB: 1945    Date of Surgery:  11/5/2024     Indications:  Breast cancer    Pre-op Diagnosis:   Malignant neoplasm of right breast in female, estrogen receptor positive, unspecified site of breast [C50.911, Z17.0]       Post-Op Diagnosis Codes:     * Malignant neoplasm of right breast in female, estrogen receptor positive, unspecified site of breast [C50.911, Z17.0]    Procedure/CPT® Codes:      Procedure(s):  RIGHT BREAST MASTECTOMY WITH RIGHT SENTINEL NODE BIOPSY AND IDENTIFICATION    Staff:  Surgeon(s):  Cristel Schwratz MD    Assistant: Walter Wheeler    Anesthesia: General    Estimated Blood Loss:  25 cc    Implants:    Implant Name Type Inv. Item Serial No.  Lot No. LRB No. Used Action   CLIPAPPLR M/ ENDO LIGACLIP 20CLP 11IN MD - [id number] Implant CLIPAPPLR M/ ENDO LIGACLIP 20CLP 11IN MD  [facility]  DIV OF J AND J [id number] Right 3 Implanted       Specimen:          Specimens       ID Source Type Tests Collected By Collected At Frozen?    A Breast, Right Tissue TISSUE PATHOLOGY EXAM   Cristel Schwartz MD 11/5/24 1651     Description: right breast; short stitch superior, long stitch lateral    B Breast, Right Tissue TISSUE PATHOLOGY EXAM   Cristel Schwartz MD 11/5/24 1653 Yes    Description: right breast sentinel node; count 972    Comment: Frozen    C Breast, Right Tissue TISSUE PATHOLOGY EXAM   Cristel Schwartz MD 11/5/24 1655 Yes    Description: right breast sentinel node; count 119    Comment: Frozen                Findings: none    Complications: none    Description of Procedure:   The morning of the procedure she was taken down to radiology where she was injected for a sentinel node. She was then brought back to the OR and placed supine on the table. An elliptical incision was made around the nipple-areolar complex and a  mastectomy was completed by removing the breast by raising skin flaps superiorly to the clavicle, medially to the sternum, out laterally to the muscle border, and inferiorly to the inframammary crease. The breast was taken off the chest wall, including the prepectoral fascia, and it was marked short stitch superior and long stitch lateral and sent down to Pathology. Through the same inicison we were able to identify the nodes using a probe. We opened the clavipectoral fascia and the nodes were identified and removed. The first had a count of 972 and the last was palpable with count of 119. The remainder of the bed was less than 10% of highest count. The wound was copiously irrigated. Clips and electrocautery were used to achieve hemostasis. The wound was copiously irrigated with sterile water and bacitracin. Two BHARATH drains were placed and the wound was carefully inspected. Electrocautery and clips were used to achieve hemostasis. The deeper layers were closed with 2-0 Vicryl and the skin was closed with running sutures. The patient tolerated the procedure well.  Assistant: Walter Wheeler  was responsible for performing the following activities: Retraction, Suction, and Irrigation and their skilled assistance was necessary for the success of this case.    [place] Node Biopsy for Breast Cancer - Right  Operation performed with curative intent. Yes   Tracer(s) used to identify sentinel nodes in the upfront surgery (non-neoadjuvant) setting (select all that apply). Radioactive tracer   Tracer(s) used to identify sentinel nodes in the neoadjuvant setting (select all that apply). N/A   All nodes (colored or non-colored) present at the end of a dye-filled lymphatic channel were removed. N/A   All significantly radioactive nodes were removed. Yes   All palpably suspicious nodes were removed. Yes   Biopsy-proven positive nodes marked with clips prior to chemotherapy were identified and removed. N/A            Cristel OMER  MD Efren     Date: 11/5/2024  Time: 17:28 EST

## 2024-11-06 ENCOUNTER — TELEPHONE (OUTPATIENT)
Dept: SURGERY | Facility: CLINIC | Age: 79
End: 2024-11-06
Payer: MEDICARE

## 2024-11-06 VITALS
WEIGHT: 123.68 LBS | DIASTOLIC BLOOD PRESSURE: 65 MMHG | SYSTOLIC BLOOD PRESSURE: 95 MMHG | BODY MASS INDEX: 23.35 KG/M2 | OXYGEN SATURATION: 96 % | TEMPERATURE: 97.7 F | HEART RATE: 73 BPM | RESPIRATION RATE: 16 BRPM | HEIGHT: 61 IN

## 2024-11-06 PROBLEM — Z90.11 S/P RIGHT MASTECTOMY: Status: ACTIVE | Noted: 2024-11-06

## 2024-11-06 PROBLEM — E11.9 TYPE 2 DIABETES MELLITUS WITHOUT COMPLICATION, WITHOUT LONG-TERM CURRENT USE OF INSULIN: Status: ACTIVE | Noted: 2024-11-06

## 2024-11-06 LAB — GLUCOSE BLDC GLUCOMTR-MCNC: 147 MG/DL (ref 70–99)

## 2024-11-06 PROCEDURE — A9270 NON-COVERED ITEM OR SERVICE: HCPCS | Performed by: NURSE PRACTITIONER

## 2024-11-06 PROCEDURE — 63710000001 CARVEDILOL 6.25 MG TABLET: Performed by: SURGERY

## 2024-11-06 PROCEDURE — 63710000001 HYDROCODONE-ACETAMINOPHEN 5-325 MG TABLET: Performed by: SURGERY

## 2024-11-06 PROCEDURE — A9270 NON-COVERED ITEM OR SERVICE: HCPCS | Performed by: SURGERY

## 2024-11-06 PROCEDURE — 63710000001 LOSARTAN 25 MG TABLET: Performed by: SURGERY

## 2024-11-06 PROCEDURE — 97165 OT EVAL LOW COMPLEX 30 MIN: CPT

## 2024-11-06 PROCEDURE — 25010000002 CEFAZOLIN PER 500 MG: Performed by: SURGERY

## 2024-11-06 PROCEDURE — 99024 POSTOP FOLLOW-UP VISIT: CPT | Performed by: NURSE PRACTITIONER

## 2024-11-06 PROCEDURE — 63710000001 PANTOPRAZOLE 40 MG TABLET DELAYED-RELEASE: Performed by: SURGERY

## 2024-11-06 PROCEDURE — L8015 EXT BREASTPROSTHESIS GARMENT: HCPCS

## 2024-11-06 PROCEDURE — 63710000001 ATORVASTATIN 20 MG TABLET: Performed by: SURGERY

## 2024-11-06 PROCEDURE — 82948 REAGENT STRIP/BLOOD GLUCOSE: CPT | Performed by: NURSE PRACTITIONER

## 2024-11-06 PROCEDURE — 97760 ORTHOTIC MGMT&TRAING 1ST ENC: CPT

## 2024-11-06 PROCEDURE — 63710000001 ONDANSETRON ODT 4 MG TABLET DISPERSIBLE: Performed by: SURGERY

## 2024-11-06 PROCEDURE — 63710000001 ASPIRIN-ACETAMINOPHEN-CAFFEINE 250-250-65 MG TABLET: Performed by: NURSE PRACTITIONER

## 2024-11-06 RX ORDER — IBUPROFEN 600 MG/1
1 TABLET ORAL
Status: DISCONTINUED | OUTPATIENT
Start: 2024-11-06 | End: 2024-11-06 | Stop reason: HOSPADM

## 2024-11-06 RX ORDER — ONDANSETRON 4 MG/1
4 TABLET, ORALLY DISINTEGRATING ORAL EVERY 6 HOURS PRN
Qty: 12 TABLET | Refills: 0 | Status: CANCELLED | OUTPATIENT
Start: 2024-11-06 | End: 2024-11-09

## 2024-11-06 RX ORDER — PROCHLORPERAZINE EDISYLATE 5 MG/ML
5 INJECTION INTRAMUSCULAR; INTRAVENOUS EVERY 6 HOURS PRN
Status: DISCONTINUED | OUTPATIENT
Start: 2024-11-06 | End: 2024-11-06 | Stop reason: HOSPADM

## 2024-11-06 RX ORDER — CLINDAMYCIN HYDROCHLORIDE 150 MG/1
150 CAPSULE ORAL 3 TIMES DAILY
Qty: 15 CAPSULE | Refills: 0 | Status: SHIPPED | OUTPATIENT
Start: 2024-11-06 | End: 2024-11-11

## 2024-11-06 RX ORDER — DEXTROSE MONOHYDRATE AND SODIUM CHLORIDE 5; .45 G/100ML; G/100ML
75 INJECTION, SOLUTION INTRAVENOUS CONTINUOUS
Status: DISCONTINUED | OUTPATIENT
Start: 2024-11-06 | End: 2024-11-06 | Stop reason: HOSPADM

## 2024-11-06 RX ORDER — INSULIN LISPRO 100 [IU]/ML
2-7 INJECTION, SOLUTION INTRAVENOUS; SUBCUTANEOUS
Status: DISCONTINUED | OUTPATIENT
Start: 2024-11-06 | End: 2024-11-06 | Stop reason: HOSPADM

## 2024-11-06 RX ORDER — NICOTINE POLACRILEX 4 MG
15 LOZENGE BUCCAL
Status: DISCONTINUED | OUTPATIENT
Start: 2024-11-06 | End: 2024-11-06 | Stop reason: HOSPADM

## 2024-11-06 RX ORDER — HYDROCODONE BITARTRATE AND ACETAMINOPHEN 5; 325 MG/1; MG/1
1 TABLET ORAL EVERY 4 HOURS PRN
Qty: 18 TABLET | Refills: 0 | Status: SHIPPED | OUTPATIENT
Start: 2024-11-06 | End: 2024-11-09

## 2024-11-06 RX ORDER — DEXTROSE MONOHYDRATE 25 G/50ML
25 INJECTION, SOLUTION INTRAVENOUS
Status: DISCONTINUED | OUTPATIENT
Start: 2024-11-06 | End: 2024-11-06 | Stop reason: HOSPADM

## 2024-11-06 RX ORDER — ONDANSETRON 4 MG/1
4 TABLET, ORALLY DISINTEGRATING ORAL EVERY 8 HOURS PRN
Qty: 9 TABLET | Refills: 0 | Status: SHIPPED | OUTPATIENT
Start: 2024-11-06 | End: 2024-11-09

## 2024-11-06 RX ORDER — HYDROCODONE BITARTRATE AND ACETAMINOPHEN 5; 325 MG/1; MG/1
1 TABLET ORAL EVERY 4 HOURS PRN
Qty: 18 TABLET | Refills: 0 | Status: CANCELLED | OUTPATIENT
Start: 2024-11-06 | End: 2024-11-09

## 2024-11-06 RX ADMIN — ONDANSETRON 4 MG: 4 TABLET, ORALLY DISINTEGRATING ORAL at 05:32

## 2024-11-06 RX ADMIN — ACETAMINOPHEN, ASPIRIN (NSAID), AND CAFFEINE 2 TABLET: 250; 250; 65 TABLET, FILM COATED ORAL at 11:41

## 2024-11-06 RX ADMIN — PANTOPRAZOLE SODIUM 40 MG: 40 TABLET, DELAYED RELEASE ORAL at 07:15

## 2024-11-06 RX ADMIN — SODIUM CHLORIDE 2000 MG: 9 INJECTION, SOLUTION INTRAVENOUS at 09:12

## 2024-11-06 RX ADMIN — CARVEDILOL 6.25 MG: 6.25 TABLET, FILM COATED ORAL at 09:17

## 2024-11-06 RX ADMIN — HYDROCODONE BITARTRATE AND ACETAMINOPHEN 1 TABLET: 5; 325 TABLET ORAL at 00:26

## 2024-11-06 RX ADMIN — LOSARTAN POTASSIUM 25 MG: 25 TABLET, FILM COATED ORAL at 09:17

## 2024-11-06 RX ADMIN — HYDROCODONE BITARTRATE AND ACETAMINOPHEN 1 TABLET: 5; 325 TABLET ORAL at 07:22

## 2024-11-06 RX ADMIN — ATORVASTATIN CALCIUM 20 MG: 20 TABLET, FILM COATED ORAL at 09:17

## 2024-11-06 RX ADMIN — DEXTROSE AND SODIUM CHLORIDE 75 ML/HR: 5; 450 INJECTION, SOLUTION INTRAVENOUS at 09:26

## 2024-11-06 RX ADMIN — SODIUM CHLORIDE 2000 MG: 9 INJECTION, SOLUTION INTRAVENOUS at 00:26

## 2024-11-06 NOTE — THERAPY EVALUATION
Patient Name: Deanne Pittman  : 1945    MRN: [id number]                              Today's Date: 2024       Admit Date: 2024    Visit Dx:     ICD-10-CM ICD-9-CM   1. At risk for lymphedema  Z91.89 V49.89   2. Malignant neoplasm of right breast in female, estrogen receptor positive, unspecified site of breast  C50.911 174.9    Z17.0 V86.0     Patient Active Problem List   Diagnosis    Cardiomyopathy    Chronic venous insufficiency    Hyperlipidemia    Essential (primary) hypertension    Stage III chronic kidney disease    Renal cyst    Malignant neoplasm of right breast in female, estrogen receptor positive    Breast cancer    S/P right mastectomy    Type 2 diabetes mellitus without complication, without long-term current use of insulin     Past Medical History:   Diagnosis Date    Cancer     BREAST    Cardiomyopathy     PT STATES R/T TO PAST MEDS/NO ISSUES PER PATIENT, FOLLOWS W/PCP-B. VESSELS    CKD (chronic kidney disease) stage 3, GFR 30-59 ml/min     FOLLOWS W/ TICO GR    Diabetes mellitus     Hyperlipidemia     Renal cyst     right    S/P right mastectomy 2024     Past Surgical History:   Procedure Laterality Date    BREAST SURGERY Left     lump removed    COLON SURGERY      COLOSTOMY AND REVERSAL    LIVER SURGERY      exploritory surgery due to puncture to liver     MASTECTOMY WITH SENTINEL NODE BIOPSY AND AXILLARY NODE DISSECTION Right 2024    Procedure: RIGHT BREAST MASTECTOMY WITH RIGHT SENTINEL NODE BIOPSY AND AXILLARY NODE DISSECTION;  Surgeon: Cristel Schwartz MD;  Location: [facility] OR [facility] [place];  Service: General;  Laterality: Right;    SUBTOTAL HYSTERECTOMY        General Information       Row Name 24 1339          OT Time and Intention    Document Type evaluation  -AC     Mode of Treatment individual therapy;occupational therapy  -AC     Patient Effort good  -AC       Row Name 24 1339          General Information    Patient Profile Reviewed yes  -AC      Prior Level of Function independent:;all household mobility;ADL's  -     Existing Precautions/Restrictions --  camisole at all times except bathing, sophie drains x2, no ROM >90, no lifting above 5 lb.  -     Barriers to Rehab none identified  -       Row Name 11/06/24 1339          Occupational Profile    Reason for Services/Referral (Occupational Profile) Pt. is a 79year old female admitted for the above diagnosis s/p right breast mastectomy on 11/5/2024.  She is currently postop day 1 on fifth floor, room air.  Patient referred to OT services for camisole fitting consult and ADL assessment. No previous OT services for current condition.  -       Row Name 11/06/24 1339          Living Environment    People in Home alone  -       Row Name 11/06/24 1339          Cognition    Orientation Status (Cognition) oriented x 3  -       Row Name 11/06/24 1339          Safety Issues/Impairments Affecting Functional Mobility    Impairments Affecting Function (Mobility) pain  -               User Key  (r) = Recorded By, (t) = Taken By, (c) = Cosigned By      Initials Name Provider Type     Skye Laura, OT Occupational Therapist                     Mobility/ADL's       Row Name 11/06/24 1348          Bed Mobility    Bed Mobility bed mobility (all) activities  -     All Activities, [place] (Bed Mobility) independent  -       Row Name 11/06/24 1348          Transfers    Transfers sit-stand transfer  -       Row Name 11/06/24 1348          Sit-Stand Transfer    Sit-Stand [place] (Transfers) independent  -       Row Name 11/06/24 1348          Functional Mobility    Functional Mobility- Ind. Level independent  -       Row Name 11/06/24 1348          Activities of Daily Living    BADL Assessment/Intervention --  Patient is independent with bathing/dressing, independent with grooming, independent with toileting, independent with self-feeding.  -               User Key  (r) = Recorded By, (t) = Taken  By, (c) = Cosigned By      Initials Name Provider Type    Skye Botello OT Occupational Therapist                   Obj/Interventions       Row Name 11/06/24 1348          Sensory Assessment (Somatosensory)    Sensory Assessment (Somatosensory) UE sensation intact  -       Row Name 11/06/24 1348          Vision Assessment/Intervention    Visual Impairment/Limitations WFL  -AC       Row Name 11/06/24 1348          Range of Motion Comprehensive    Comment, General Range of Motion BUE wfl below surgical precautions  -       Row Name 11/06/24 1348          Strength Comprehensive (MMT)    Comment, General Manual Muscle Testing (MMT) Assessment NT  -AC       Row Name 11/06/24 1348          Motor Skills    Motor Skills coordination;functional endurance  -AC     Coordination WFL  -     Functional Endurance fair/nauseous/headache  -       Row Name 11/06/24 1348          Balance    Balance Assessment standing dynamic balance  -AC     Dynamic Standing Balance independent  -AC     Position/Device Used, Standing Balance unsupported  -AC               User Key  (r) = Recorded By, (t) = Taken By, (c) = Cosigned By      Initials Name Provider Type    Skye Botello OT Occupational Therapist                Orthotic Management/Training:  Location: trunk  Type: Amoena post surgical garment  Type Modifier: off the shelf  Functional Improvement: reduce surgical edema (allows for increased ROM), reduce scar tissue and support sensation normalization  Fitting/Training Provided: sizing of orthotic, instructions in use, modification of orthotic (size of fiber filled form for symmetry)    Orthotic Fabrication/Fit:  Location: right   Kind of Orthotic Needed: postsurgical compression camisole with drain management  Reason for prosthetic: reduce surgical edema (allows for increased ROM), reduce scar tissue and support sensation normalization  Type of Orthotic Provided: post surgical compression camisole   Brand  Dispensed:Amol  Model # Dispensed: Rosanna [id number]  Size Dispensed: small A/B  Reason for Orthotic: lymphedema management/prevention  Date to Initiate Orthotic Use: 11/6/2024  Wearing Schedule: continuously except for bathing  Tolerance: tolerates well  Number of garments dispensed: 1      Goals/Plan    Limitation in Self-Care Goal 1 (OT)  Activity/Device: To prevent exacerbation of post-surgical edema, patient will utilize the Amol Deluca camisole kit 2860 with poly-fiber breast form daily.  [place] Level/Cues Needed: [place]  Time Frame: long term goal (LTG); 10 days  Treatment: Fitting and training of Juan Carlosna Sandrine style 2860 post-surgical camisole size small A/B, orthotic management/training, ADL retraining, and patient education                   Clinical Impression       Row Name 11/06/24 1352          Pain Assessment    Pretreatment Pain Rating 1/10  -AC     Posttreatment Pain Rating 1/10  -AC     Pain Location head;chest  -AC       Row Name 11/06/24 9522          Plan of Care Review    Plan of Care Reviewed With patient  -AC     Progress no change  -AC     Outcome Evaluation Patient was fitted for Amol Marte post surgical compression camisole style 2867 small A/B with right breast form.  Patient instructed to wear breast form once BHARATH drains were removed.  Patient is pending hospital discharge today.  She was provided with educational handouts and follow-up contact information for outpatient lymphedema specialist for additional camisoles and treatment.  -AC       Row Name 11/06/24 1354          Therapy Assessment/Plan (OT)    Patient/Family Therapy Goal Statement (OT) home.  -AC     Rehab Potential (OT) good  -AC     Criteria for Skilled Therapeutic Interventions Met (OT) meets criteria;yes  -AC     Therapy Frequency (OT) 5 times/wk  evaluation and 1 treatment  -AC       Row Name 11/06/24 7324          Therapy Plan Review/Discharge Plan (OT)    Anticipated Discharge Disposition (OT) home with  assist;other (see comments)  outpatient lymphedema  -AC       Row Name 11/06/24 1352          Positioning and Restraints    Pre-Treatment Position in bed  -AC     Post Treatment Position bed  -AC     In Bed fowlers;call light within reach;encouraged to call for assist;with family/caregiver  -AC               User Key  (r) = Recorded By, (t) = Taken By, (c) = Cosigned By      Initials Name Provider Type    AC Skye Laura OT Occupational Therapist                   Outcome Measures       Row Name 11/06/24 1358          How much help from another is currently needed...    Putting on and taking off regular lower body clothing? 4  -AC     Bathing (including washing, rinsing, and drying) 4  -AC     Toileting (which includes using toilet bed pan or urinal) 4  -AC     Putting on and taking off regular upper body clothing 4  -AC     Taking care of personal grooming (such as brushing teeth) 4  -AC     Eating meals 4  -AC     AM-PAC 6 Clicks Score (OT) 24  -AC       Row Name 11/06/24 0721          How much help from another person do you currently need...    Turning from your back to your side while in flat bed without using bedrails? 3  -NO     Moving from lying on back to sitting on the side of a flat bed without bedrails? 3  -NO     Moving to and from a bed to a chair (including a wheelchair)? 3  -NO     Standing up from a chair using your arms (e.g., wheelchair, bedside chair)? 3  -NO     Climbing 3-5 steps with a railing? 3  -NO     To walk in hospital room? 3  -NO     AM-PAC 6 Clicks Score (PT) 18  -NO       Row Name 11/06/24 1358          Functional Assessment    Outcome Measure Options AM-PAC 6 Clicks Daily Activity (OT);Optimal Instrument  -AC       Row Name 11/06/24 1350          Optimal Instrument    Optimal Instrument Optimal - 3  -AC     Bending/Stooping 1  -AC     Standing 1  -AC     Reaching 2  -AC     From the list, choose the 3 activities you would most like to be able to do without any difficulty  Bending/stooping;Standing;Reaching  -AC     Total Score Optimal - 3 4  -AC               User Key  (r) = Recorded By, (t) = Taken By, (c) = Cosigned By      Initials Name Provider Type    Skye Botello OT Occupational Therapist    NO Ida Montiel, RN Registered Nurse                    Occupational Therapy Education        No education to display                  OT Recommendation and Plan  Therapy Frequency (OT): 5 times/wk (evaluation and 1 treatment)  Plan of Care Review  Plan of Care Reviewed With: patient  Progress: no change  Outcome Evaluation: Patient was fitted for Amol Delucah post surgical compression camisole style 2867 small A/B with right breast form.  Patient instructed to wear breast form once BHARATH drains were removed.  Patient is pending hospital discharge today.  She was provided with educational handouts and follow-up contact information for outpatient lymphedema specialist for additional camisoles and treatment.     Time Calculation:   Evaluation Complexity (OT)  Review Occupational Profile/Medical/Therapy History Complexity: expanded/moderate complexity  Assessment, Occupational Performance/Identification of Deficit Complexity: 1-3 performance deficits  Clinical Decision Making Complexity (OT): problem focused assessment/low complexity  Overall Complexity of Evaluation (OT): low complexity     Time Calculation- OT       Row Name 11/06/24 1359             Time Calculation- OT    OT Received On 11/06/24  -AC      OT Goal Re-Cert Due Date 11/15/24  -AC         Timed Charges    45349 - OT Orthotic Management 10  -AC         Untimed Charges    OT Eval/Re-eval Minutes 35  -AC         Total Minutes    Timed Charges Total Minutes 10  -AC      Untimed Charges Total Minutes 35  -AC       Total Minutes 45  -AC                User Key  (r) = Recorded By, (t) = Taken By, (c) = Cosigned By      Initials Name Provider Type    Skye Botello OT Occupational Therapist                  Therapy Charges for  Today       Code Description Service Date Service Provider Modifiers Qty    92702728981 HC OT EVAL LOW COMPLEXITY 3 11/6/2024 Skye Laura, OT GO 1    66835839989 HC CAMISOLE POST/SURG MARTIN ALL SZ/COLOR AMOENA 11/6/2024 Skye Laura, OT  1    37724434058 HC OT ORTHOTIC MGMT/TRAIN EA 15 MIN 11/6/2024 Skye Laura, OT GO 1                 Skye Laura OT  11/6/2024

## 2024-11-06 NOTE — PROGRESS NOTES
"POST OP PROGRESS NOTE     Patient Name:  Deanne Pittman  YOB: 1945  [id number]   LOS: 0 days   1 Day Post-Op  Patient Care Team:  Huyen Damon APRN as PCP - General (Nurse Practitioner)  Areli Davis MD as Consulting Physician (Nephrology)  Sony Gutierrez MD as Consulting Physician (Ophthalmology)  Susanna Lozano, RN as Nurse Navigator          Subjective     Interval History:   VSS, afebrile, pain controlled, nauseated and vomiting this morning    Review of Systems:    A complete review of systems was performed and all are negative except what is documented in the HPI.       Objective     Constitutional:  well nourished, no acute distress, appears stated age /69 (BP Location: Left leg, Patient Position: Sitting)   Pulse 64   Temp 97.5 °F (36.4 °C) (Oral)   Resp 16   Ht 154.9 cm (60.98\")   Wt 56.1 kg (123 lb 10.9 oz)   SpO2 95%   BMI 23.38 kg/m²    Eyes:  anicteric sclerae, moist conjunctivae, no lid lag, PERRLA  ENMT:  oropharynx clear, moist mucous membranes, good dentition  Neck:   full ROM, trachea midline, no thyromegaly  Cardiovascular: RRR, S1 and S2 present, no MRG, heart rate 64, no pedal edema  Respiratory: lungs CTA, respirations even and unlabored  GI:  Abdomen soft, nontender, nondistended, no HSM     Skin:  warm and dry, normal turgor, no rashes, right chest incision with dressing intact, BHARATH drains with serosang output  Psychiatric:  alert and oriented x3, intact judgment and insight, cooperative          Results Review:       I reviewed the patient's new clinical results including  no new labs.     No results found for: \"WBC\", \"RBC\", \"HGB\", \"HCT\", \"MCV\", \"MCH\", \"MCHC\", \"RDW\", \"RDWSD\", \"MPV\", \"PLT\", \"NEUTRORELPCT\", \"LYMPHORELPCT\", \"MONORELPCT\", \"EOSRELPCT\", \"BASORELPCT\", \"AUTOIGPER\", \"NEUTROABS\", \"LYMPHSABS\", \"MONOSABS\", \"EOSABS\", \"BASOSABS\", \"AUTOIGNUM\", \"NRBC\"      Basic Metabolic Panel    Sodium No results found for: \"NA\"   Potassium No results " "found for: \"K\"   Chloride No results found for: \"CL\"   Bicarbonate No results found for: \"PLASMABICARB\"   BUN No results found for: \"BUN\"   Creatinine No results found for: \"CREATININE\"   Calcium No results found for: \"CALCIUM\"   Glucose      No components found for: \"GLUCOSE.*\"       Lab Results   Component Value Date    GLUCOSE 101 (H) 10/03/2024    BUN 21 10/03/2024    CREATININE 1.00 10/03/2024     10/03/2024    K 4.7 10/03/2024     10/03/2024    CALCIUM 10.0 10/03/2024    PROTEINTOT 7.0 10/03/2024    ALBUMIN 4.4 10/03/2024    ALT 14 10/03/2024    AST 16 10/03/2024    ALKPHOS 92 10/03/2024    BILITOT 0.9 10/03/2024    GLOB 2.6 10/03/2024    AGRATIO 1.7 10/03/2024    BCR 21.0 10/03/2024    ANIONGAP 9.6 10/03/2024    EGFR 57.8 (L) 10/03/2024       IMAGING:  Imaging Results (Last 72 Hours)       ** No results found for the last 72 hours. **            Medications:    Current Facility-Administered Medications:     atorvastatin (LIPITOR) tablet 20 mg, 20 mg, Oral, Daily, Cristel Schwartz MD, 20 mg at 11/06/24 0917    carvedilol (COREG) tablet 6.25 mg, 6.25 mg, Oral, BID With Meals, Cristel Schwartz MD, 6.25 mg at 11/06/24 0917    ceFAZolin 2000 mg IVPB in 100 mL NS (VTB), 2,000 mg, Intravenous, Q8H, Cristel Schwartz MD, 2,000 mg at 11/06/24 0912    dextrose (D50W) (25 g/50 mL) IV injection 25 g, 25 g, Intravenous, Q15 Min PRN, Shawna English, APRBURT    dextrose (GLUTOSE) oral gel 15 g, 15 g, Oral, Q15 Min PRN, Shawna English, APRN    dextrose 5 % and sodium chloride 0.45 % infusion, 75 mL/hr, Intravenous, Continuous, Cristel Schwartz MD, Last Rate: 75 mL/hr at 11/05/24 2024, 75 mL/hr at 11/05/24 2024    dextrose 5 % and sodium chloride 0.45 % infusion, 75 mL/hr, Intravenous, Continuous, Shawna English, BETSY    glucagon (GLUCAGEN) injection 1 mg, 1 mg, Intramuscular, Q15 Min PRN, Shawna English, BETSY    HYDROcodone-acetaminophen (NORCO) 5-325 MG per tablet 1 tablet, 1 tablet, " Oral, Q4H PRN, Cristel Schwartz MD, 1 tablet at 11/06/24 0722    influenza vac split high-dose (FLUZONE HIGH DOSE) injection 0.5 mL, 0.5 mL, Intramuscular, During Hospitalization, Cristel Schwartz MD    Insulin Lispro (humaLOG) injection 2-7 Units, 2-7 Units, Subcutaneous, 4x Daily AC & at Bedtime, Shawna English APRN    losartan (COZAAR) tablet 25 mg, 25 mg, Oral, Daily, Cristel Schwartz MD, 25 mg at 11/06/24 0917    morphine injection 2 mg, 2 mg, Intravenous, Q2H PRN **AND** naloxone (NARCAN) injection 0.4 mg, 0.4 mg, Intravenous, Q5 Min PRN, Cristel Schwartz MD    ondansetron ODT (ZOFRAN-ODT) disintegrating tablet 4 mg, 4 mg, Oral, Q6H PRN, 4 mg at 11/06/24 0532 **OR** ondansetron (ZOFRAN) injection 4 mg, 4 mg, Intravenous, Q6H PRN, Cristel Schwartz MD    pantoprazole (PROTONIX) EC tablet 40 mg, 40 mg, Oral, Q AM, Cristel Schwartz MD, 40 mg at 11/06/24 0715    prochlorperazine (COMPAZINE) injection 5 mg, 5 mg, Intravenous, Q6H PRN, Shawna English APRN    Assessment & Plan       Malignant neoplasm of right breast in female, estrogen receptor positive    Breast cancer    S/P right mastectomy    Type 2 diabetes mellitus without complication, without long-term current use of insulin  Postop nausea and vomiting   Diet as tolerated   Add compazine 5 mg IV q 6 hours prn nausea vomiting   Subq insulin management    If n/v improves may go home after lunch        Electronically signed by BETSY Bailey, 11/06/24, 9:18 AM EST.

## 2024-11-06 NOTE — ANESTHESIA POSTPROCEDURE EVALUATION
Patient: Deanne Pittman    Procedure Summary       Date: 11/05/24 Room / Location: [facility]  / [facility]    Anesthesia Start: 1622 Anesthesia Stop: 1737    Procedure: RIGHT BREAST MASTECTOMY WITH RIGHT SENTINEL NODE BIOPSY AND AXILLARY NODE DISSECTION (Right: Breast) Diagnosis:       Malignant neoplasm of right breast in female, estrogen receptor positive, unspecified site of breast      (Malignant neoplasm of right breast in female, estrogen receptor positive, unspecified site of breast [C50.911, Z17.0])    Surgeons: Cristel Schwartz MD Provider: Edgar Lu MD    Anesthesia Type: general ASA Status: 2            Anesthesia Type: general    Vitals  Vitals Value Taken Time   /77 11/05/24 1854   Temp 36.2 °C (97.2 °F) 11/05/24 1750   Pulse 66 11/05/24 1908   Resp 16 11/05/24 1840   SpO2 98 % 11/05/24 1908   Vitals shown include unfiled device data.        Post Anesthesia Care and Evaluation    Patient location during evaluation: bedside  Patient participation: complete - patient participated  Level of consciousness: awake  Pain score: 2  Pain management: adequate    Airway patency: patent  Anesthetic complications: No anesthetic complications  PONV Status: none  Cardiovascular status: acceptable and stable  Respiratory status: acceptable  Hydration status: acceptable

## 2024-11-06 NOTE — PLAN OF CARE
Goal Outcome Evaluation:  Plan of Care Reviewed With: patient        Progress: no change  Outcome Evaluation: Patient was fitted for Amol Rosanna post surgical compression camisole style 2867 small A/B with right breast form.  Patient instructed to wear breast form once BHARATH drains were removed.  Patient is pending hospital discharge today.  She was provided with educational handouts and follow-up contact information for outpatient lymphedema specialist for additional camisoles and treatment.    Anticipated Discharge Disposition (OT): home with assist, other (see comments) (outpatient lymphedema)

## 2024-11-06 NOTE — PLAN OF CARE
Goal Outcome Evaluation:  Plan of Care Reviewed With: patient           Outcome Evaluation: C/o migraine, excedrin given x1, effective, minimal c/o surgery related pain, norco given x1, effective, c/o nausea and vomitting with breakfast, tolerating crackers, passing gas, no BM, up to bathroom, d/c teaching complete, preparing to d/c home with family.

## 2024-11-06 NOTE — PLAN OF CARE
Goal Outcome Evaluation:  Plan of Care Reviewed With: patient        Progress: no change  Outcome Evaluation: Patient up to bedside commode throughout the night. C/o pain and nausea treated per MAR. Right side BHARATH drains remain in place with moderate bloody output. Patient did not eat during the night however, is tolerating liquids well. No acute changes overnight.

## 2024-11-11 LAB
CYTO UR: NORMAL
LAB AP CASE REPORT: NORMAL
LAB AP CLINICAL INFORMATION: NORMAL
LAB AP SPECIAL STAINS: NORMAL
LAB AP SYNOPTIC CHECKLIST: NORMAL
Lab: NORMAL
PATH REPORT.FINAL DX SPEC: NORMAL
PATH REPORT.GROSS SPEC: NORMAL

## 2024-11-12 ENCOUNTER — TELEPHONE (OUTPATIENT)
Dept: SURGERY | Facility: CLINIC | Age: 79
End: 2024-11-12
Payer: MEDICARE

## 2024-11-13 ENCOUNTER — OFFICE VISIT (OUTPATIENT)
Dept: SURGERY | Facility: CLINIC | Age: 79
End: 2024-11-13
Payer: MEDICARE

## 2024-11-13 VITALS — RESPIRATION RATE: 18 BRPM | BODY MASS INDEX: 23.98 KG/M2 | HEIGHT: 61 IN | WEIGHT: 127 LBS

## 2024-11-13 DIAGNOSIS — C50.911 MALIGNANT NEOPLASM OF RIGHT BREAST IN FEMALE, ESTROGEN RECEPTOR POSITIVE, UNSPECIFIED SITE OF BREAST: Primary | ICD-10-CM

## 2024-11-13 DIAGNOSIS — Z17.0 MALIGNANT NEOPLASM OF RIGHT BREAST IN FEMALE, ESTROGEN RECEPTOR POSITIVE, UNSPECIFIED SITE OF BREAST: Primary | ICD-10-CM

## 2024-11-13 PROCEDURE — 99024 POSTOP FOLLOW-UP VISIT: CPT | Performed by: SURGERY

## 2024-11-13 NOTE — PROGRESS NOTES
Chief Complaint  Follow-up    Subjective          Follow-up  The patient is here to follow up on right breast mastectomy with SLN.  They are doing well and have no complaints.  Pathology is shown below:    Results for orders placed or performed during the hospital encounter of 11/05/24   ECG 12 Lead Pre-Op / Pre-Procedure    Collection Time: 11/05/24 10:55 AM   Result Value Ref Range    QT Interval 416 ms    QTC Interval 469 ms   POC Glucose Once    Collection Time: 11/05/24 11:16 AM    Specimen: Blood   Result Value Ref Range    Glucose 123 (H) 70 - 99 mg/dL   Tissue Pathology Exam    Collection Time: 11/05/24  4:51 PM    Specimen: A: Breast, Right; Tissue    B: [place] Lymph Node; Tissue    C: [place] Lymph Node; Tissue   Result Value Ref Range    Case Report       Surgical Pathology Report                         Case: [id number]                                  Authorizing Provider:  Cristel Schwartz MD    Collected:           11/05/2024 04:51 PM          Ordering Location:     [facility]  Received:            11/06/2024 09:10 AM                                 OR                                                                           Pathologist:           Michelle Urena DO                                                       Specimens:   1) - Breast, Right, right breast; short stitch superior, long stitch lateral                        2) - [place] Lymph Node, right breast sentinel node; count 972                                     3) - [place] Lymph Node, right breast sentinel node; count 119                            Clinical Information       Malignant neoplasm of right breast in female, estrogen receptor positive, unspecified site of breast      Final Diagnosis       1.  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  Right breast sentinel node count 972, biopsy:    - One lymph  node, negative for metastatic carcinoma (0/1)    - See synoptic report      3.  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)       Histologic Grade (Niko Histologic Score):            Glandular (Acinar) / Tubular Differentiation:    Score 3         Nuclear Pleomorphism:    Score 1         Mitotic Rate:    Score 1         Overall Grade:    Grade 1 (scores of 3, 4 or 5)       Tumor Size:    Greatest dimension of largest invasive focus (Millimeters): 27 mm      Ductal Carcinoma In Situ (DCIS):    Present       Lymphatic and / or Vascular Invasion:    Not identified       Treatment Effect in the Breast:    No known presurgical therapy       MARGINS      Margin Status for Invasive Carcinoma:    All margins negative for invasive carcinoma         Distance from Invasive Carcinoma to Closest Margin:    9 mm        Closest Margin(s) to Invasive Carcinoma:    Posterior       Margin Status for DCIS:    All margins negative for DCIS         Distance from DCIS to Closest Margin:    Greater than: 2 mm      REGIONAL LYMPH NODES      Regional Lymph Node Status:            :    All regional lymph nodes negative for tumor         Total Number of Lymph Nodes Examined (sentinel and non-sentinel):    2         Number of [place] Nodes Examined:    2       pTNM CLASSIFICATION (AJCC 8th Edition)      Reporting of pT, pN, and (when applicable) pM categories is based on information available to the pathologist at the time the report is issued. As per the AJCC (Chapter 1, 8th Ed.) it is the managing  "physician's responsibility to establish the final pathologic stage based upon all pertinent information, including but potentially not limited to this pathology report.      pT Category:    pT2       pN Category:    pN0       N Suffix:    (sn)       SPECIAL STUDIES      Estrogen Receptor (ER) Status:    Positive (greater than 10% of cells demonstrate nuclear positivity)         Percentage of Cells with Nuclear Positivity:    95 %      Progesterone Receptor (PgR) Status:    Positive         Percentage of Cells with Nuclear Positivity:    15 %      HER2 (by immunohistochemistry):    Equivocal (Score 2+)       HER2 (by in situ hybridization):    Negative (not amplified)       Ki-67 Percentage of Positive Nuclei:    12 %      Testing Performed on Case Number:    [id number]       Intraoperative Consultation       2.  Right breast sentinel node count 972, biopsy: Negative for macrometastatic carcinoma.  3.  Right breast sentinel node; count 119, biopsy: Negative for macrometastatic carcinoma.    The above frozen diagnosis was called to Dr. Schwartz at 5:23 PM on 11/5/2024 by Dr. JESSICA Urena.       Gross Description       1. Breast, Right.  Received fresh and subsequently placed in formalin, labeled \"right breast, short stitch superior, long stitch lateral\" is a 542 g, 23.0 x 19.5 x 3.5 cm right simple mastectomy specimen with 2 stitches.  The anterior aspect (inked blue) displays a 20.0 x 10.0 cm skin ellipse with a 3.5 x 3.0 cm tan, wrinkled areola and a 1.0 x 0.7 cm tan, flat, inverted nipple.  The posterior aspect (inked black) displays tan-pink and pink, shaggy connective tissue over lobulated adipose tissue; no muscular tissue is grossly appreciated.  Serially sectioning from medial to lateral reveals a 7.0 x 5.5 x 2.5 cm tan, indurated, ill-defined fibrous mass containing 2 coiled metallic biopsy clips from 11:00-1:00 across the inner and outer upper quadrants and extending into the lower quadrants.  The mass is " "located 0.5 cm from the nearest posterior margin, 1.5 cm of the nearest anterosuperior margin, and 3.0 cm of the nipple.  The remaining uninvolved parenchyma includes 15% white fibrous tissue and 85% lobulated adipose tissue.  Representative sections are submitted as follows: 1A-mass with 11:00 biopsy cavity and posterior margin; 1B-11:00 biopsy cavity; 1C-1D-bisected slice of mass from 11:00; 1E- 1F-bisected slice of mass with 12:00 biopsy cavity and anterosuperior margin; 5P-8Z-nopgxpayg of 12:00 biopsy cavity; 1I-mass with anterosuperior margin; 1J-additional section of mass; 3H-2Q-udvjcgus mass with anteroinferior margin; 1N-fibrous tissue from upper inner quadrant; 1O-fibrous tissue from lower inner quadrant; 1P-fibrous tissue from upper outer quadrant; 1Q-fibrous tissue from lower outer quadrant; 1R-nipple.  Cold ischemic time-25 minutes; total formalin fixation time-26 hours and 44 minutes.  Additional sections of the anterior/posterior margins from the lower outer quadrant are submitted in cassettes 1S-1T, respectively.  11/7/2024 JOSEPHINE  2. [place] Lymph Node.  Received fresh for intraoperative consultation (frozen section performed) labeled \"right breast sentinel node count 972\" is a 2.0 cm lymph node encased in adipose tissue.  The lymph node is entirely frozen and subsequently submitted in 1 cassette.    3. [place] Lymph Node.  Received fresh for intraoperative consultation (frozen section performed) labeled \"right breast sentinel node count 119\" is a 2.3 cm lymph node encased in adipose tissue.  The lymph node is entirely frozen and subsequently submitted in 1 cassette.        Special Stains       Immunohistochemical stains for CK7 are performed on blocks 2A and 3A and are negative for metastatic carcinoma.All immunohistochemical/cytochemical stains (IHC) are performed on separate slides per different antibody unless otherwise specified in the documentation that a cocktail (multiple stain) was performed. " " Controls are appropriate.        Microscopic Description       Microscopic examination performed.     POC Glucose STAT    Collection Time: 11/05/24  6:18 PM    Specimen: Blood   Result Value Ref Range    Glucose 136 (H) 70 - 99 mg/dL   POC Glucose 4x Daily Before Meals & at Bedtime    Collection Time: 11/06/24 11:45 AM    Specimen: Blood   Result Value Ref Range    Glucose 147 (H) 70 - 99 mg/dL        Objective   Vital Signs:   Resp 18   Ht 154.9 cm (60.98\")   Wt 57.6 kg (127 lb)   BMI 24.01 kg/m²     Physical Exam  Vitals and nursing note reviewed.   Constitutional:       General: She is not in acute distress.     Appearance: Normal appearance. She is well-developed.   HENT:      Head: Normocephalic and atraumatic.   Eyes:      Extraocular Movements: Extraocular movements intact.      Pupils: Pupils are equal, round, and reactive to light.   Cardiovascular:      Pulses: Normal pulses.   Pulmonary:      Effort: Pulmonary effort is normal. No retractions.      Breath sounds: Normal air entry. No wheezing.   Abdominal:      General: There is no distension.      Palpations: Abdomen is soft.      Tenderness: There is no abdominal tenderness.      Hernia: No hernia is present.   Musculoskeletal:         General: No swelling or deformity.      Cervical back: Neck supple.   Skin:     General: Skin is warm and dry.      Findings: No erythema.      Comments: Surgical Incision Healing Well   Neurological:      General: No focal deficit present.      Mental Status: She is alert and oriented to person, place, and time.      Motor: Motor function is intact.   Psychiatric:         Mood and Affect: Mood normal.         Thought Content: Thought content normal.    She has ecchymosis in skin but no hematoma      Result Review :                 Assessment and Plan    Diagnoses and all orders for this visit:    1. Malignant neoplasm of right breast in female, estrogen receptor positive, unspecified site of breast " (Primary)    Followup one week for remaining drain removal    Follow Up   No follow-ups on file.  Patient was given instructions and counseling regarding her condition or for health maintenance advice. Please see specific information pulled into the AVS if appropriate.

## 2024-11-21 ENCOUNTER — OFFICE VISIT (OUTPATIENT)
Dept: SURGERY | Facility: CLINIC | Age: 79
End: 2024-11-21
Payer: MEDICARE

## 2024-11-21 VITALS — BODY MASS INDEX: 23.66 KG/M2 | HEIGHT: 61 IN | RESPIRATION RATE: 15 BRPM | WEIGHT: 125.3 LBS

## 2024-11-21 DIAGNOSIS — C50.911 MALIGNANT NEOPLASM OF RIGHT BREAST IN FEMALE, ESTROGEN RECEPTOR POSITIVE, UNSPECIFIED SITE OF BREAST: Primary | ICD-10-CM

## 2024-11-21 DIAGNOSIS — Z17.0 MALIGNANT NEOPLASM OF RIGHT BREAST IN FEMALE, ESTROGEN RECEPTOR POSITIVE, UNSPECIFIED SITE OF BREAST: Primary | ICD-10-CM

## 2024-11-21 PROCEDURE — 99024 POSTOP FOLLOW-UP VISIT: CPT | Performed by: SURGERY

## 2024-11-21 NOTE — PROGRESS NOTES
Chief Complaint  Follow-up    Subjective          Primary Care Follow-Up    The patient is here to follow up on right breast mastectomy with SLN.  They are doing well and have no complaints.  Pathology is shown below:    Results for orders placed or performed during the hospital encounter of 11/05/24   ECG 12 Lead Pre-Op / Pre-Procedure    Collection Time: 11/05/24 10:55 AM   Result Value Ref Range    QT Interval 416 ms    QTC Interval 469 ms   POC Glucose Once    Collection Time: 11/05/24 11:16 AM    Specimen: Blood   Result Value Ref Range    Glucose 123 (H) 70 - 99 mg/dL   Tissue Pathology Exam    Collection Time: 11/05/24  4:51 PM    Specimen: A: Breast, Right; Tissue    B: [place] Lymph Node; Tissue    C: [place] Lymph Node; Tissue   Result Value Ref Range    Case Report       Surgical Pathology Report                         Case: [id number]                                  Authorizing Provider:  Cristel Schwartz MD    Collected:           11/05/2024 04:51 PM          Ordering Location:     [facility]  Received:            11/06/2024 09:10 AM                                 OR                                                                           Pathologist:           Michelle Urena DO                                                       Specimens:   1) - Breast, Right, right breast; short stitch superior, long stitch lateral                        2) - [place] Lymph Node, right breast sentinel node; count 972                                     3) - [place] Lymph Node, right breast sentinel node; count 119                            Clinical Information       Malignant neoplasm of right breast in female, estrogen receptor positive, unspecified site of breast      Final Diagnosis       1.  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  Right breast sentinel node count 972, biopsy:     - One lymph node, negative for metastatic carcinoma (0/1)    - See synoptic report      3.  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)       Histologic Grade (Niko Histologic Score):            Glandular (Acinar) / Tubular Differentiation:    Score 3         Nuclear Pleomorphism:    Score 1         Mitotic Rate:    Score 1         Overall Grade:    Grade 1 (scores of 3, 4 or 5)       Tumor Size:    Greatest dimension of largest invasive focus (Millimeters): 27 mm      Ductal Carcinoma In Situ (DCIS):    Present       Lymphatic and / or Vascular Invasion:    Not identified       Treatment Effect in the Breast:    No known presurgical therapy       MARGINS      Margin Status for Invasive Carcinoma:    All margins negative for invasive carcinoma         Distance from Invasive Carcinoma to Closest Margin:    9 mm        Closest Margin(s) to Invasive Carcinoma:    Posterior       Margin Status for DCIS:    All margins negative for DCIS         Distance from DCIS to Closest Margin:    Greater than: 2 mm      REGIONAL LYMPH NODES      Regional Lymph Node Status:            :    All regional lymph nodes negative for tumor         Total Number of Lymph Nodes Examined (sentinel and non-sentinel):    2         Number of [place] Nodes Examined:    2       pTNM CLASSIFICATION (AJCC 8th Edition)      Reporting of pT, pN, and (when applicable) pM categories is based on information available to the pathologist at the time the report is issued. As per the AJCC (Chapter 1, 8th Ed.) it is the  "managing physician's responsibility to establish the final pathologic stage based upon all pertinent information, including but potentially not limited to this pathology report.      pT Category:    pT2       pN Category:    pN0       N Suffix:    (sn)       SPECIAL STUDIES      Estrogen Receptor (ER) Status:    Positive (greater than 10% of cells demonstrate nuclear positivity)         Percentage of Cells with Nuclear Positivity:    95 %      Progesterone Receptor (PgR) Status:    Positive         Percentage of Cells with Nuclear Positivity:    15 %      HER2 (by immunohistochemistry):    Equivocal (Score 2+)       HER2 (by in situ hybridization):    Negative (not amplified)       Ki-67 Percentage of Positive Nuclei:    12 %      Testing Performed on Case Number:    [id number]       Intraoperative Consultation       2.  Right breast sentinel node count 972, biopsy: Negative for macrometastatic carcinoma.  3.  Right breast sentinel node; count 119, biopsy: Negative for macrometastatic carcinoma.    The above frozen diagnosis was called to Dr. Schwartz at 5:23 PM on 11/5/2024 by Dr. JESSICA Urena.       Gross Description       1. Breast, Right.  Received fresh and subsequently placed in formalin, labeled \"right breast, short stitch superior, long stitch lateral\" is a 542 g, 23.0 x 19.5 x 3.5 cm right simple mastectomy specimen with 2 stitches.  The anterior aspect (inked blue) displays a 20.0 x 10.0 cm skin ellipse with a 3.5 x 3.0 cm tan, wrinkled areola and a 1.0 x 0.7 cm tan, flat, inverted nipple.  The posterior aspect (inked black) displays tan-pink and pink, shaggy connective tissue over lobulated adipose tissue; no muscular tissue is grossly appreciated.  Serially sectioning from medial to lateral reveals a 7.0 x 5.5 x 2.5 cm tan, indurated, ill-defined fibrous mass containing 2 coiled metallic biopsy clips from 11:00-1:00 across the inner and outer upper quadrants and extending into the lower quadrants.  The " "mass is located 0.5 cm from the nearest posterior margin, 1.5 cm of the nearest anterosuperior margin, and 3.0 cm of the nipple.  The remaining uninvolved parenchyma includes 15% white fibrous tissue and 85% lobulated adipose tissue.  Representative sections are submitted as follows: 1A-mass with 11:00 biopsy cavity and posterior margin; 1B-11:00 biopsy cavity; 1C-1D-bisected slice of mass from 11:00; 1E- 1F-bisected slice of mass with 12:00 biopsy cavity and anterosuperior margin; 2T-4X-nxhlbubiu of 12:00 biopsy cavity; 1I-mass with anterosuperior margin; 1J-additional section of mass; 5C-3N-ylsvefjb mass with anteroinferior margin; 1N-fibrous tissue from upper inner quadrant; 1O-fibrous tissue from lower inner quadrant; 1P-fibrous tissue from upper outer quadrant; 1Q-fibrous tissue from lower outer quadrant; 1R-nipple.  Cold ischemic time-25 minutes; total formalin fixation time-26 hours and 44 minutes.  Additional sections of the anterior/posterior margins from the lower outer quadrant are submitted in cassettes 1S-1T, respectively.  11/7/2024 JOSEPHINE  2. [place] Lymph Node.  Received fresh for intraoperative consultation (frozen section performed) labeled \"right breast sentinel node count 972\" is a 2.0 cm lymph node encased in adipose tissue.  The lymph node is entirely frozen and subsequently submitted in 1 cassette.    3. [place] Lymph Node.  Received fresh for intraoperative consultation (frozen section performed) labeled \"right breast sentinel node count 119\" is a 2.3 cm lymph node encased in adipose tissue.  The lymph node is entirely frozen and subsequently submitted in 1 cassette.        Special Stains       Immunohistochemical stains for CK7 are performed on blocks 2A and 3A and are negative for metastatic carcinoma.All immunohistochemical/cytochemical stains (IHC) are performed on separate slides per different antibody unless otherwise specified in the documentation that a cocktail (multiple stain) was " "performed.  Controls are appropriate.        Microscopic Description       Microscopic examination performed.     POC Glucose STAT    Collection Time: 11/05/24  6:18 PM    Specimen: Blood   Result Value Ref Range    Glucose 136 (H) 70 - 99 mg/dL   POC Glucose 4x Daily Before Meals & at Bedtime    Collection Time: 11/06/24 11:45 AM    Specimen: Blood   Result Value Ref Range    Glucose 147 (H) 70 - 99 mg/dL        Objective   Vital Signs:   Resp 15   Ht 154.9 cm (60.98\")   Wt 56.8 kg (125 lb 4.8 oz)   BMI 23.69 kg/m²     Physical Exam  Vitals and nursing note reviewed.   Constitutional:       General: She is not in acute distress.     Appearance: Normal appearance. She is well-developed.   HENT:      Head: Normocephalic and atraumatic.   Eyes:      Extraocular Movements: Extraocular movements intact.      Pupils: Pupils are equal, round, and reactive to light.   Cardiovascular:      Pulses: Normal pulses.   Pulmonary:      Effort: Pulmonary effort is normal. No retractions.      Breath sounds: Normal air entry. No wheezing.   Abdominal:      General: There is no distension.      Palpations: Abdomen is soft.      Tenderness: There is no abdominal tenderness.      Hernia: No hernia is present.   Musculoskeletal:         General: No swelling or deformity.      Cervical back: Neck supple.   Skin:     General: Skin is warm and dry.      Findings: No erythema.      Comments: Surgical Incision Healing Well   Neurological:      General: No focal deficit present.      Mental Status: She is alert and oriented to person, place, and time.      Motor: Motor function is intact.   Psychiatric:         Mood and Affect: Mood normal.         Thought Content: Thought content normal.      She has ecchymosis in skin but no hematoma- remaining drain removed today- incision looks good, ecchymosis resolved      Result Review :                 Assessment and Plan    Diagnoses and all orders for this visit:    1. Malignant neoplasm of " right breast in female, estrogen receptor positive, unspecified site of breast (Primary)    Followup for incision check in next available spot    Follow Up   Return in about 2 weeks (around 12/5/2024).  Patient was given instructions and counseling regarding her condition or for health maintenance advice. Please see specific information pulled into the AVS if appropriate.

## 2024-11-26 ENCOUNTER — HOSPITAL ENCOUNTER (OUTPATIENT)
Facility: HOSPITAL | Age: 79
Setting detail: THERAPIES SERIES
Discharge: HOME | End: 2024-11-26
Payer: MEDICARE

## 2024-11-26 DIAGNOSIS — Z17.0 MALIGNANT NEOPLASM OF UPPER-OUTER QUADRANT OF RIGHT BREAST IN FEMALE, ESTROGEN RECEPTOR POSITIVE: ICD-10-CM

## 2024-11-26 DIAGNOSIS — C50.411 MALIGNANT NEOPLASM OF UPPER-OUTER QUADRANT OF RIGHT BREAST IN FEMALE, ESTROGEN RECEPTOR POSITIVE: ICD-10-CM

## 2024-11-26 DIAGNOSIS — Z91.89 AT RISK FOR LYMPHEDEMA: Primary | ICD-10-CM

## 2024-11-26 PROCEDURE — 97535 SELF CARE MNGMENT TRAINING: CPT | Performed by: OCCUPATIONAL THERAPIST

## 2024-11-26 PROCEDURE — 93702 BIS XTRACELL FLUID ANALYSIS: CPT | Performed by: OCCUPATIONAL THERAPIST

## 2024-11-26 NOTE — THERAPY RE-EVALUATION
Outpatient Occupational Therapy Lymphedema Re-Evaluation   Larisa     Patient Name: Deanne Pittman  : 1945  MRN: [id number]  Today's Date: 2024      Visit Date: 2024    Patient Active Problem List   Diagnosis    Cardiomyopathy    Chronic venous insufficiency    Hyperlipidemia    Essential (primary) hypertension    Stage III chronic kidney disease    Renal cyst    Malignant neoplasm of right breast in female, estrogen receptor positive    Breast cancer    S/P right mastectomy    Type 2 diabetes mellitus without complication, without long-term current use of insulin        Past Medical History:   Diagnosis Date    Cancer     BREAST    Cardiomyopathy     PT STATES R/T TO PAST MEDS/NO ISSUES PER PATIENT, FOLLOWS W/PCP-B. VESSELS    CKD (chronic kidney disease) stage 3, GFR 30-59 ml/min     FOLLOWS W/ TICO GR    Diabetes mellitus     Hyperlipidemia     Renal cyst     right    S/P right mastectomy 2024        Past Surgical History:   Procedure Laterality Date    BREAST SURGERY Left     lump removed    COLON SURGERY      COLOSTOMY AND REVERSAL    LIVER SURGERY      exploritory surgery due to puncture to liver     MASTECTOMY WITH SENTINEL NODE BIOPSY AND AXILLARY NODE DISSECTION Right 2024    Procedure: RIGHT BREAST MASTECTOMY WITH RIGHT SENTINEL NODE BIOPSY AND AXILLARY NODE DISSECTION;  Surgeon: Cristel Schwartz MD;  [place] [facility] [place] [facility] [place]  Service: General;  Laterality: Right;    SUBTOTAL HYSTERECTOMY           Visit Dx:     ICD-10-CM ICD-9-CM   1. At risk for lymphedema  Z91.89 V49.89   2. Malignant neoplasm of upper-outer quadrant of right breast in female, estrogen receptor positive  C50.411 174.4    Z17.0 V86.0        Patient History       Row Name 24 0900             History    Chief Complaint --  Lymphedema surveillance program  -TD      Brief Description of Current Complaint Deanne is a 79-year-old female with a recent diagnosis of invasive ductal carcinoma grade 1  ER/OR positive of the right breast.  Patient underwent right-sided mastectomy on November 5, 2024 and had 2 lymphnodes at that time.  -TD         Fall Risk Assessment    Any falls in the past year: No  -TD      Does patient have a fear of falling No  -TD         Services    Are you currently receiving Home Health services No  -TD      Do you plan to receive Home Health services in the near future No  -TD         Daily Activities    Primary Language English  -TD      Are you able to read Yes  -TD      Are you able to write Yes  -TD      How does patient learn best? Listening;Reading;Demonstration;Pictures/Video  -TD         Safety    Are you being hurt, hit, or frightened by anyone at home or in your life? No  -TD      Are you being neglected by a caregiver No  -TD      Have you had any of the following issues with N/A  -TD                User Key  (r) = Recorded By, (t) = Taken By, (c) = Cosigned By      Initials Name Provider Type    TD Lauren Peacock OT Occupational Therapist                     Lymphedema       Row Name 11/26/24 0900             Subjective Pain    Able to rate subjective pain? yes  -TD      Pre-Treatment Pain Level 0  -TD      Post-Treatment Pain Level 0  -TD         Subjective    Subjective Comments Pt reports that she is feeling well.  -TD         Lymphedema Assessment    Lymphedema Classification RUE:;at risk/stage 0  -TD      Lymphedema Cancer Related Sx right;simple mastectomy;sentinel node biopsy  -TD      Lymphedema Surgery Comments 11/5/2024  -TD      Lymph Nodes Removed # 2  -TD      Positive Lymph Nodes # 0  -TD      Chemo Received no  -TD      Radiation Therapy Received no  -TD         LLIS - Physical Concerns    The amount of pain associated with my lymphedema is: 0  -TD      The amount of limb heaviness associated with my lymphedema is: 0  -TD      The amount of skin tightness associated with my lymphedema is: 0  -TD      The size of my swollen limb(s) seems: 0  -TD      Lymphedema  none "affects the movement of my swollen limb(s): 0  -TD      The strength in my swollen limb(s) is: 0  -TD         LLIS - Psychosocial Concerns    Lymphedema affects my body image (i.e., \"how I think I look\"). 0  -TD      Lymphedema affects my socializing with others. 0  -TD      Lymphedema affects my intimate relations with spouse or partner (rate 0 if not applicable 0  -TD      Lymphedema \"gets me down\" (i.e., depression, frustration, or anger) 0  -TD      I must rely on others for help due to my lymphedema. 0  -TD      I know what to do to manage my lymphedema 3  -TD         LLIS - Functional Concerns    Lymphedema affects my ability to perform self-care activities (i.e. eating, dressing, hygiene) 0  -TD      Lymphedema affects my ability to perform routine home or work-related activities. 0  -TD      Lymphedema affects my performance of preferred leisure activities. 0  -TD      Lymphedema affects proper fit of clothing/shoes 0  -TD      Lymphedema affects my sleep 0  -TD         Posture/Observations    Posture- WNL Posture is WNL  -TD         General ROM    RT Upper Ext Rt Shoulder ABduction;Rt Shoulder Flexion  -TD         Right Upper Ext    Rt Shoulder Abduction AROM 90  -TD      Rt Shoulder Flexion AROM 90  -TD         MMT (Manual Muscle Testing)    General MMT Comments Not tested due to pain  -TD         Skin Changes/Observations    Location/Assessment Upper Quadrant  -TD      Upper Quadrant Conditions right:;scab(s)  -TD      Upper Quadrant Color/Pigment right:;hyperpigmented;purple  -TD      Skin Observations Comment Pt has scar tissue and thickness thoughtout the right chest wall and bruising on the right chest wall.  -TD         L-Dex Bioimpedence Screening    L-Dex Measurement Extremity RUE  -TD      L-Dex Patient Position Standing  -TD      L-Dex UE Dominate Side Right  -TD      L-Dex UE At Risk Side Right  -TD      L-Dex UE Pre Surgical Value Yes  -TD      L-Dex UE Score -2.6  -TD      L-Dex UE Baseline " Score 5.6  -TD      L-Dex UE Value Change -8.2  -TD      $ L-Dex Charge yes  -TD         Lymphedema Life Impact Scale Totals    A.  Total Q1 - Q17 (Do not include Q18) 3  -TD      B.  Total number of questions answered (Q1-Q17) 17  -TD      C. Divide A by B 0.18  -TD      D. Multiple C by 25 4.5  -TD                User Key  (r) = Recorded By, (t) = Taken By, (c) = Cosigned By      Initials Name Provider Type    Lauren Adan OT Occupational Therapist                      The patient had a follow up  SOZO measurement which I reviewed today. The score is   WFL, see scanned to EMR. Bioimpedance spectroscopy helps identify the   onset of lymphedema in an arm or leg before patients experience noticeable swelling. Research has   shown that 92% of patients with early detection of lymphedema using L-Dex combined with   intervention do not progress to chronic lymphedema through three years. Additionally, as of March 2023, the NCCN Guidelines® for Survivorship recommend proactive screening for lymphedema using   bioimpedance spectroscopy. Whenever possible, patients are tested for baseline L-Dex score before   cancer treatment begins and then are reassessed during regular follow-up visits using the SOZO device.   Otherwise, this can be started postoperatively and continued during regular follow-up visits. If the   patient’s L-Dex score increases above normal levels, that is a sign that lymphedema is developing and a   referral is made to physical therapy for further evaluation and early compression treatment.   Lymphedema assessment with the SOZO L-Dex score is recommended to be done every 3 months for   the first 3 years and then every 6 months for years 4 and 5 followed by annually afterwards       Therapy Education  Education Details: Patient provided education on risk factors for lymphedema to include greater than 6 lymph node removal, BMI greater than 30, mastectomy versus lumpectomy, radiation therapy, and Taxol  use and advanced age. Patient provided with the Le[employer] lymphedema action plan stoplight to recovery handout (Rehabilitation Oncology: July 2019 - Volume 37 - Issue 3 - p 122-127 doi: 10.1097/01.[id number].[id number]) to improve patient's ability to identify lymph exacerbation and provide guidance on appropriate action to be taken to control symptoms. Patient provided with education on a simple self-manual lymphatic drainage technique. Handouts provided. Patient exhibited fair return demonstration of skill. Patient will benefit from continued education to ensure carryover skill.  Given: HEP, Symptoms/condition management, Edema management, Pain management  Program: New  How Provided: Verbal, Demonstration, Written  Provided to: Patient  Level of Understanding: Teach back education performed, Verbalized, Demonstrated  33446 - OT Self Care/Mgmt Minutes: 40         OT Goals       Row Name 11/26/24 1059          Time Calculation    OT Goal Re-Cert Due Date 12/26/24  -TD               User Key  (r) = Recorded By, (t) = Taken By, (c) = Cosigned By      Initials Name Provider Type    Lauren Adan OT Occupational Therapist                  1. Post Breast Surgery Care/at risk for Lymphedema  LTG 1: 90 days:  As an indicator of no exacerbation of lymphedema staging, the patient will present with an L-Dex score less than [10] points from preoperative baseline.              STATUS: on going  STG 1a:   30 days: To prevent exacerbation of mixed edema to lymphedema, patient will utilize the 2 postsurgical compression garments daily.                 STATUS: on going  STG 1b: 30 days: Patient will be independent with self-manual lymphatic massage.               STATUS: on going  STG 1c: 30 days:  Patient will be independent with identification of signs and symptoms of lymphedema exasperation per stoplight to recovery education handout.              STATUS: on going  STG 1 d: 30 days: Patient will be independent with HEP to prevent  advancement in lymphedema staging.              STATUS: on going  TREATMENT:  Self Care/ADL retraining, Therapeutic Activity, Neuromuscular Re-education, Therapeutic Exercise, Bioimpedence Fluid Analysis, Post-Surgical compression garement [id number] [facility] [employer] 2860K, Orthotic Management and training,  and Manual Therapy.     OT Assessment/Plan       Row Name 11/26/24 0929          OT Assessment    Functional Limitations Limitations in functional capacity and performance  -TD     Impairments Impaired lymphatic circulation  -TD     Assessment Comments Agnieszka is a 79-year-old female with a recent diagnosis of invasive ductal carcinoma grade 1 ER/[place] positive of the right breast.  Pt agnieszka l-dex score is within functional limits but she has signficant bruising and some swelling in the right chest wall.  Her range of motion is also affected.  Pt will be brought in in two weeks to re evaluate.   Patient underwent right-sided mastectomy on November 5, 2024 and had 2 lymphnodes at that time.  Patient would benefit from continued skilled occupational therapy to prevent increased staging of lymphedema, increased pain, and decreased range of motion.  -TD     OT Diagnosis at risk for lymphedema  -TD     OT Rehab Potential Good  -TD     Patient/caregiver participated in establishment of treatment plan and goals Yes  -TD     Patient would benefit from skilled therapy intervention Yes  -TD        OT Plan    OT Frequency --  see duration  -TD     Predicted Duration of Therapy Intervention (OT) Pt will be seen in two weeks.  Patient will be seen every 3 months years 1-3, and every 6 months years 4 and 5.  -TD     Planned CPT's? OT EVAL LOW COMPLEXITY: 75530;OT RE-EVAL: 16759;OT THER ACT EA 15 MIN: 30681CF;OT THER PROC EA 15 MIN: 75363KF;OT SELF CARE/MGMT/TRAIN 15 MIN: 95002;OT MANUAL THERAPY EA 15 MIN: 61400;OT BIS XTRACELL FLUID ANALYSIS: 49364;OT CARE PLAN EA 15 MIN;OT ORTHOTIC MGMT/TRAIN EA 15 MIN: 44458;OT  ORTHO/PROSTHET CHECKOUT EA 15 MIN: 85360  -TD     Planned Therapy Interventions (Optional Details) home exercise program;postural re-education;prosthetic fitting/training;patient/family education;orthotic fitting/training;ROM (Range of Motion);strengthening;stretching;manual therapy techniques  -TD     OT Plan Comments continue POC  -TD               User Key  (r) = Recorded By, (t) = Taken By, (c) = Cosigned By      Initials Name Provider Type    TD Lauren Peacock OT Occupational Therapist                              Time Calculation:   Timed Charges  89140 - OT Self Care/Mgmt Minutes: 40  Total Minutes  Timed Charges Total Minutes: 40   Total Minutes: 40     Therapy Charges for Today       Code Description Service Date Service Provider Modifiers Qty    21685817501 HC PT BIS XTRACELL FLUID ANALYSIS 11/26/2024 Lauren Peacock OT  1    58095219900 HC OT SELF CARE/MGMT/TRAIN EA 15 MIN 11/26/2024 Lauren Peacock OT GO 3                      Lauren Peacock OT  11/26/2024

## 2024-12-02 ENCOUNTER — APPOINTMENT (OUTPATIENT)
Dept: OCCUPATIONAL THERAPY | Facility: HOSPITAL | Age: 79
End: 2024-12-02
Payer: MEDICARE

## 2024-12-12 ENCOUNTER — OFFICE VISIT (OUTPATIENT)
Dept: ONCOLOGY | Facility: HOSPITAL | Age: 79
End: 2024-12-12
Payer: MEDICARE

## 2024-12-12 ENCOUNTER — OFFICE VISIT (OUTPATIENT)
Dept: SURGERY | Facility: CLINIC | Age: 79
End: 2024-12-12
Payer: MEDICARE

## 2024-12-12 ENCOUNTER — TELEPHONE (OUTPATIENT)
Facility: HOSPITAL | Age: 79
End: 2024-12-12
Payer: MEDICARE

## 2024-12-12 ENCOUNTER — HOSPITAL ENCOUNTER (OUTPATIENT)
Facility: HOSPITAL | Age: 79
Setting detail: THERAPIES SERIES
Discharge: HOME | End: 2024-12-12
Payer: MEDICARE

## 2024-12-12 VITALS
WEIGHT: 123.5 LBS | DIASTOLIC BLOOD PRESSURE: 105 MMHG | OXYGEN SATURATION: 99 % | SYSTOLIC BLOOD PRESSURE: 117 MMHG | HEIGHT: 61 IN | RESPIRATION RATE: 18 BRPM | HEART RATE: 73 BPM | BODY MASS INDEX: 23.32 KG/M2 | TEMPERATURE: 97 F

## 2024-12-12 DIAGNOSIS — L90.5 SCAR CONDITION AND FIBROSIS OF SKIN: ICD-10-CM

## 2024-12-12 DIAGNOSIS — Z15.09 GENETIC CARRIER OF HERITABLE CANCER: Primary | ICD-10-CM

## 2024-12-12 DIAGNOSIS — Z17.0 MALIGNANT NEOPLASM OF RIGHT BREAST IN FEMALE, ESTROGEN RECEPTOR POSITIVE, UNSPECIFIED SITE OF BREAST: Primary | ICD-10-CM

## 2024-12-12 DIAGNOSIS — C50.919 INVASIVE CARCINOMA OF BREAST: ICD-10-CM

## 2024-12-12 DIAGNOSIS — Z44.9 FITTING AND ADJUSTMENT OF UNSPECIFIED PROSTHETIC DEVICE: ICD-10-CM

## 2024-12-12 DIAGNOSIS — Z17.0 MALIGNANT NEOPLASM OF UPPER-OUTER QUADRANT OF RIGHT BREAST IN FEMALE, ESTROGEN RECEPTOR POSITIVE: ICD-10-CM

## 2024-12-12 DIAGNOSIS — E55.9 VITAMIN D DEFICIENCY: ICD-10-CM

## 2024-12-12 DIAGNOSIS — C50.411 MALIGNANT NEOPLASM OF UPPER-OUTER QUADRANT OF RIGHT BREAST IN FEMALE, ESTROGEN RECEPTOR POSITIVE: ICD-10-CM

## 2024-12-12 DIAGNOSIS — Z90.10 DEFORMITY DUE TO MASTECTOMY: ICD-10-CM

## 2024-12-12 DIAGNOSIS — Z90.11 HISTORY OF MASTECTOMY, RIGHT: ICD-10-CM

## 2024-12-12 DIAGNOSIS — M25.60 JOINT STIFFNESS: ICD-10-CM

## 2024-12-12 DIAGNOSIS — C50.911 MALIGNANT NEOPLASM OF RIGHT BREAST IN FEMALE, ESTROGEN RECEPTOR POSITIVE, UNSPECIFIED SITE OF BREAST: Primary | ICD-10-CM

## 2024-12-12 DIAGNOSIS — N64.89 DEFORMITY DUE TO MASTECTOMY: ICD-10-CM

## 2024-12-12 DIAGNOSIS — Z91.89 AT RISK FOR LYMPHEDEMA: Primary | ICD-10-CM

## 2024-12-12 DIAGNOSIS — R52 PAIN: ICD-10-CM

## 2024-12-12 PROCEDURE — G0463 HOSPITAL OUTPT CLINIC VISIT: HCPCS | Performed by: INTERNAL MEDICINE

## 2024-12-12 PROCEDURE — 97535 SELF CARE MNGMENT TRAINING: CPT | Performed by: OCCUPATIONAL THERAPIST

## 2024-12-12 PROCEDURE — 99024 POSTOP FOLLOW-UP VISIT: CPT | Performed by: SURGERY

## 2024-12-12 RX ORDER — ANASTROZOLE 1 MG/1
1 TABLET ORAL DAILY
Qty: 30 TABLET | Refills: 0 | Status: SHIPPED | OUTPATIENT
Start: 2024-12-12

## 2024-12-12 NOTE — PROGRESS NOTES
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 Cristel Alicea Mariya Pittman is a 79 y.o. female who presents to [facility] for Right HR + Breast cancer, cT1,cN0Mx diagnosed 9/23/2024.    Patient underwent screening mammogram on 7/26/2024, which revealed questionable architectural distortion right breast, she subsequently underwent diagnostic mammogram on 9/3/2024 which revealed high suspicious mass in the right breast measuring 6 mm on mammogram corresponding to architectural distortion in the right breast at 12:00, also incidentally seen was a smoothly marginated hypoechoic 6 mm mass at 11:00.    Pathology report from right breast biopsy at 12 o'clock position on 9/23/2024 revealed at 11 o'clock position, 4 cm from nipple, invasive carcinoma no special type (ductal), grade 1, ER +95%, PA +50%, HER2 equivocal by IHC 2+, HER2 FISH was negative.    Past medical history: Type 2 diabetes, CKD stage III, hyperlipidemia    Social history: Quit smoking tobacco in 2018, used to smoke half a pack per day for 30 to 40 years    Family history: Patient reports 2 sisters with breast cancer diagnosed in their 40s in 1987, reports 1 sister also head rectal cancer along with breast cancer.      Interval history: Patient to follow-up regarding adjuvant treatment after undergoing right mastectomy in November 2024.  Patient has recovered well from surgery.  Patient to discuss results of [facility] genetic testing after last  "clinic appointment.  Due to renal impairment, patient follows with nephrologist.  History of Present Illness         Review of Systems     Oncology/Hematology History    No history exists.       Objective     Vitals:    12/12/24 1319   BP: (!) 117/105   Pulse: 73   Resp: 18   Temp: 97 °F (36.1 °C)   TempSrc: Temporal   SpO2: 99%   Weight: 56 kg (123 lb 8 oz)   Height: 154.9 cm (60.98\")   PainSc: 0-No pain       ECOG score: 0         PHQ-9 Total Score:         Physical Exam  Vitals reviewed. Exam conducted with a chaperone present.   Constitutional:       General: She is not in acute distress.     Appearance: Normal appearance.   HENT:      Head: Normocephalic and atraumatic.   Eyes:      Extraocular Movements: Extraocular movements intact.      Conjunctiva/sclera: Conjunctivae normal.   Cardiovascular:      Comments: Mass palpated in right breast, no bilateral axillary lymphadenopathy.  Pulmonary:      Effort: Pulmonary effort is normal.   Musculoskeletal:      Cervical back: Normal range of motion and neck supple.   Skin:     General: Skin is warm and dry.      Findings: No bruising.   Neurological:      Mental Status: She is oriented to person, place, and time.         Physical Exam        Past Medical History     Past Medical History:   Diagnosis Date    Cancer     BREAST    Cardiomyopathy     PT STATES R/T TO PAST MEDS/NO ISSUES PER PATIENT, FOLLOWS W/PCP-B. VESSELS    CKD (chronic kidney disease) stage 3, GFR 30-59 ml/min     FOLLOWS W/ TICO GR    Diabetes mellitus     Hyperlipidemia     Renal cyst     right    S/P right mastectomy 11/06/2024     Current Outpatient Medications on File Prior to Visit   Medication Sig Dispense Refill    atorvastatin (LIPITOR) 20 MG tablet TAKE 1 TABLET BY MOUTH AT NIGHT 90 tablet 0    carvedilol (COREG) 6.25 MG tablet Take 1 tablet by mouth 2 (two) times a day.      cyanocobalamin (VITAMIN B-12) 1000 MCG tablet Take 1 tablet by mouth.      fluticasone (FLONASE) 50 MCG/ACT " nasal spray 2 sprays into the nostril(s) as directed by provider Daily. 48 g 3    losartan (COZAAR) 25 MG tablet Take 1 tablet by mouth Daily.      metFORMIN (GLUCOPHAGE) 500 MG tablet Take 1 tablet by mouth 2 (Two) Times a Day With Meals. 180 tablet 1     No current facility-administered medications on file prior to visit.      Allergies   Allergen Reactions    Penicillins Unknown - Low Severity     Beta lactam allergy details  Antibiotic reaction: unknown  Age at reaction: adult  Dose to reaction time: days  Reason for antibiotic: unknown  Epinephrine required for reaction?: no  Tolerated antibiotics: unknown       Farxiga [Dapagliflozin] Headache     Past Surgical History:   Procedure Laterality Date    BREAST SURGERY Left     lump removed    COLON SURGERY      COLOSTOMY AND REVERSAL    LIVER SURGERY      exploritory surgery due to puncture to liver     MASTECTOMY WITH SENTINEL NODE BIOPSY AND AXILLARY NODE DISSECTION Right 2024    Procedure: RIGHT BREAST MASTECTOMY WITH RIGHT SENTINEL NODE BIOPSY AND AXILLARY NODE DISSECTION;  Surgeon: Cristel Schwartz MD;  Location: [facility] OR [facility] [place];  Service: General;  Laterality: Right;    SUBTOTAL HYSTERECTOMY       Social History     Socioeconomic History    Marital status:    Tobacco Use    Smoking status: Former     Current packs/day: 0.00     Average packs/day: 0.5 packs/day for 45.0 years (22.5 ttl pk-yrs)     Types: Cigarettes     Start date:      Quit date: 2018     Years since quittin.9     Passive exposure: Past    Smokeless tobacco: Never   Vaping Use    Vaping status: Never Used   Substance and Sexual Activity    Alcohol use: Never    Drug use: Never    Sexual activity: Defer     Family History   Problem Relation Age of Onset    Skin cancer Mother         skin carcinoma    Breast cancer Sister     Lupus Sister         lupus erythematosus    Breast cancer Sister     Rectal cancer Sister        Results     Result Review   The following data  was reviewed by: Darby Cole MD on 10/03/2024:  Lab Results   Component Value Date    HGB 13.4 10/03/2024    HCT 41.0 10/03/2024    MCV 94.9 10/03/2024     10/03/2024    WBC 8.30 10/03/2024    NEUTROABS 4.57 10/03/2024    LYMPHSABS 2.66 10/03/2024    MONOSABS 0.78 10/03/2024    EOSABS 0.18 10/03/2024    BASOSABS 0.07 10/03/2024     Lab Results   Component Value Date    GLUCOSE 101 (H) 10/03/2024    BUN 21 10/03/2024    CREATININE 1.00 10/03/2024     10/03/2024    K 4.7 10/03/2024     10/03/2024    CO2 25.4 10/03/2024    CALCIUM 10.0 10/03/2024    PROTEINTOT 7.0 10/03/2024    ALBUMIN 4.4 10/03/2024    BILITOT 0.9 10/03/2024    ALKPHOS 92 10/03/2024    AST 16 10/03/2024    ALT 14 10/03/2024     Lab Results   Component Value Date    MG 2.1 07/14/2021    PHOS 3.8 07/14/2021    FREET4 1.31 06/19/2024    TSH 2.450 06/19/2024       Surgical Pathology Report                         Case: [id number]                                   Authorizing Provider:  Tj Hernandez MD  Collected:           09/23/2024 02:05 PM           Ordering Location:     [facility]      Received:            09/24/2024 06:55 AM                                  MAMMOGRAPHY                                                                   Pathologist:           Michelle Urena DO                                                       Specimens:   1) - Breast, Right, RT BREAST U/S BX/1200, 4CMFN                                                    2) - Breast, Right, RT BREAST U/S BX/1100,4CMFN                                            Clinical Information    Right breast subareolar mass   Final Diagnosis   1. Right breast,  12 o'clock position, 4 cm from nipple, ultrasound-guided core biopsy:               - Fibroadenomatoid change  - Columnar cell change   - Fibrosis        2. Right breast,  11 o'clock position, 4 cm from nipple, ultrasound-guided core biopsy:  - Invasive carcinoma of no special type  (ductal)  - Histologic grade (Roosevelt Histologic Score):    - Glandular (Acinar)/Tubular Differentiation:  Score 3  - Nuclear Pleomorphism:  Score 1  - Mitotic Rate:  Score 1  - Overall Grade:  Grade 1               - Size of largest focus of invasion:  8 mm               - Breast biomarker testing:                            - Estrogen Receptor (ER):  Positive (95%, strong)                            - Progesterone Receptor (PgR):  Positive (15%, strong)                            - HER2 (by immunohistochemistry):  Equivocal (Score 2+), see comment  - Ki-67:  12%               - Other findings:                            - 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 LUIS FERNANDO Hernandez at 16:11 on 9/25/2024 by Zia KOTHARI.           Comment:  HER2 FISH has been ordered.  See separate reference laboratory report for results.    Electronically signed by Michelle Urena DO on 9/25/2024 at 1641   Synoptic Checklist   Breast Biomarker Reporting Template   Protocol posted: 12/13/2023BREAST BIOMARKER REPORTING TEMPLATE - All Specimens  Test(s) Performed     Estrogen Receptor (ER) Status  Positive (greater than 10% of cells demonstrate nuclear positivity)   Percentage of Cells with Nuclear Positivity  95 %   Average Intensity of Staining  Strong   Test Type  Food and Drug Administration (FDA) cleared (test / vendor): Roche   Primary Antibody  SP1   Test(s) Performed     Progesterone Receptor (PgR) Status  Positive   Percentage of Cells with Nuclear Positivity  15 %   Average Intensity of Staining  Strong   Test Type  Food and Drug Administration (FDA) cleared (test / vendor): Roche   Primary Antibody  1E2   Test(s) Performed     HER2 by Immunohistochemistry  Equivocal (Score 2+)   Percentage of Cells with Uniform Intense Complete Membrane Staining   0 %   Test Type  Food and Drug Administration (FDA) cleared (test / vendor): Roche   Primary Antibody  CB11   Test(s) Performed  Ki-67   Ki-67 Percentage of Positive Nuclei  12 %   Cold Ischemia and Fixation Times  Meet requirements specified in latest version of the ASCO / CAP Guidelines          No radiology results for the last day       Assessment & Plan     Diagnoses and all orders for this visit:    1. 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 Invasive carcinoma of breast  -     anastrozole (ARIMIDEX) 1 MG tablet; Take 1 tablet by mouth Daily.  Dispense: 30 tablet; Refill: 0  -     CBC & Differential; Future  -     Comprehensive Metabolic Panel; Future    3. Vitamin D deficiency  -     Vitamin D,25-Hydroxy; Future           Deanne Pittman is a 79 y.o. female who presents to [facility] HEMATOLOGY [facility] ONCOLOGY for Right HR + Breast cancer, Invasive carcinoma no special type (ductal), grade 1, ER +95%, AL +50%, HER2 equivocal by IHC 2+, HER2 FISH was negative, pT2pN0, oncotype DX score of 15    -Reviewed mammogram, breast biopsy results with patient today and discussed diagnosis with patient.  -Patient is s/p right mastectomy performed by Dr. Schwartz  - Obtained Oncotype DX score report determine she requires adjuvant chemotherapy, pt with low score of 15, thus discussed that patient does not require adjuvant chemotherapy due to lack of benefit.  - Shared with her at a minimum she require 5 years of endocrine therapy with anastrozole, provided handout and discussed common side effects of this medication today.  -Prescribed anastrozole on 12/12/2024, will patient follow-up in 1 month to assess toxicity      Family history of cancer  - Patient with 2 sisters with breast cancer diagnosed in the 40s, 1 sister also had rectal cancer  - Thus ordered [facility] genetic testing, in October 2024, which revealed a BARD1 mutation, refer  patient to have genetic counseling as especially given patient having several questions regarding testing for her daughter    Osteoporosis  -seen on DEXA scan on 7/7/2023  -recommended pt discuss starting Zometa or prolia with nephrologist, pt is going to schedule an appointment with nephrologist in the next month prior to her next clinic appointment with me  -also recommend dental evaluation for clearance prior to starting these medications as well.     Discussed plan to have patient follow-up in 4 weeks with labs CBC, CMP, Vit D    Please note that portions of this note were completed with a voice recognition program.    Electronically signed by Darby Cole MD, 12/12/24, 5:28 PM EST.    Assessment & Plan      Follow Up     I spent 45 minutes caring for Deanne on this date of service. This time includes time spent by me in the following activities:preparing for the visit, reviewing tests, obtaining and/or reviewing a separately obtained history, performing a medically appropriate examination and/or evaluation , counseling and educating the patient/family/caregiver, ordering medications, tests, or procedures, referring and communicating with other health care professionals , documenting information in the medical record, independently interpreting results and communicating that information with the patient/family/caregiver, and care coordination    Any chemotherapy or immunotherapy or other systemic therapy treatment plan involves a high risk of complications and/or mortality of patient management.    The patient was seen and examined. Work by the provider also included review and/or ordering of lab tests, review and/or ordering of radiology tests, review and/or ordering of medicine tests, discussion with other physicians or providers, independent review of data, obtaining old records, review/summation of old records, and/or other review.    I have reviewed the family history, social history, and past medical history  for this patient. Previous information and data has been reviewed and updated as needed. I have reviewed and verified the chief complaint, history, and other documentation. The patient was interviewed and examined in the clinic and the chart reviewed. The previous observations, recommendations, and conclusions were reviewed including those of other providers.     The plan was discussed with the patient and/or family. The patient was given time to ask questions and these questions were answered. At the conclusion of their visit they had no additional questions or concerns and all questions were answered to their satisfaction.    Patient was given instructions and counseling regarding her condition or for health maintenance advice. Please see specific information pulled into the AVS if appropriate.

## 2024-12-12 NOTE — PATIENT INSTRUCTIONS
Ask nephrologist about clearance to take medication for osteoporosis and ask him about Vitamin D and Calcium

## 2024-12-12 NOTE — THERAPY RE-EVALUATION
Outpatient Occupational Therapy Lymphedema Re-Evaluation   Larisa     Patient Name: Deanne Pittman  : 1945  MRN: [id number]  Today's Date: 2024      Visit Date: 2024    Patient Active Problem List   Diagnosis    Cardiomyopathy    Chronic venous insufficiency    Hyperlipidemia    Essential (primary) hypertension    Stage III chronic kidney disease    Renal cyst    Malignant neoplasm of right breast in female, estrogen receptor positive    Breast cancer    S/P right mastectomy    Type 2 diabetes mellitus without complication, without long-term current use of insulin        Past Medical History:   Diagnosis Date    Cancer     BREAST    Cardiomyopathy     PT STATES R/T TO PAST MEDS/NO ISSUES PER PATIENT, FOLLOWS W/PCP-B. VESSELS    CKD (chronic kidney disease) stage 3, GFR 30-59 ml/min     FOLLOWS W/ TICO GR    Diabetes mellitus     Hyperlipidemia     Renal cyst     right    S/P right mastectomy 2024        Past Surgical History:   Procedure Laterality Date    BREAST SURGERY Left     lump removed    COLON SURGERY      COLOSTOMY AND REVERSAL    LIVER SURGERY      exploritory surgery due to puncture to liver     MASTECTOMY WITH SENTINEL NODE BIOPSY AND AXILLARY NODE DISSECTION Right 2024    Procedure: RIGHT BREAST MASTECTOMY WITH RIGHT SENTINEL NODE BIOPSY AND AXILLARY NODE DISSECTION;  Surgeon: Cristel Schwartz MD;  Location: [facility] OR [facility] [place];  Service: General;  Laterality: Right;    SUBTOTAL HYSTERECTOMY           Visit Dx:     ICD-10-CM ICD-9-CM   1. At risk for lymphedema  Z91.89 V49.89   2. Scar condition and fibrosis of skin  L90.5 709.2   3. Joint stiffness  M25.60 719.50   4. Pain  R52 780.96   5. Malignant neoplasm of upper-outer quadrant of right breast in female, estrogen receptor positive  C50.411 174.4    Z17.0 V86.0        Patient History       Row Name 24 0900             History    Chief Complaint --  Lymphedema surveillance program  -TD      Brief  Description of Current Complaint Deanne is a 79-year-old female with a recent diagnosis of invasive ductal carcinoma grade 1 ER/MT positive of the right breast.  Patient underwent right-sided mastectomy on November 5, 2024 and had 2 lymphnodes at that time.  -TD         Fall Risk Assessment    Any falls in the past year: No  -TD      Does patient have a fear of falling No  -TD         Services    Are you currently receiving Home Health services No  -TD      Do you plan to receive Home Health services in the near future No  -TD         Daily Activities    Primary Language English  -TD      Are you able to read Yes  -TD      Are you able to write Yes  -TD      How does patient learn best? Listening;Reading;Demonstration;Pictures/Video  -TD         Safety    Are you being hurt, hit, or frightened by anyone at home or in your life? No  -TD      Are you being neglected by a caregiver No  -TD      Have you had any of the following issues with N/A  -TD                User Key  (r) = Recorded By, (t) = Taken By, (c) = Cosigned By      Initials Name Provider Type    TD Lauren Peacock OT Occupational Therapist                     Lymphedema       Row Name 12/12/24 0900             Subjective Pain    Able to rate subjective pain? yes  -TD      Pre-Treatment Pain Level 0  -TD      Post-Treatment Pain Level 0  -TD         Subjective    Subjective Comments Pt reports she has been doing her exercises since last session.  -TD         Lymphedema Assessment    Lymphedema Classification RUE:;at risk/stage 0  -TD      Lymphedema Cancer Related Sx right;simple mastectomy;sentinel node biopsy  -TD      Lymphedema Surgery Comments 11/5/2024  -TD      Lymph Nodes Removed # 2  -TD      Positive Lymph Nodes # 0  -TD      Chemo Received no  -TD      Radiation Therapy Received no  -TD         LLIS - Physical Concerns    The amount of pain associated with my lymphedema is: 0  -TD      The amount of limb heaviness associated with my lymphedema is:  "0  -TD      The amount of skin tightness associated with my lymphedema is: 0  -TD      The size of my swollen limb(s) seems: 0  -TD      Lymphedema affects the movement of my swollen limb(s): 0  -TD      The strength in my swollen limb(s) is: 0  -TD         LLIS - Psychosocial Concerns    Lymphedema affects my body image (i.e., \"how I think I look\"). 0  -TD      Lymphedema affects my socializing with others. 0  -TD      Lymphedema affects my intimate relations with spouse or partner (rate 0 if not applicable 0  -TD      Lymphedema \"gets me down\" (i.e., depression, frustration, or anger) 0  -TD      I must rely on others for help due to my lymphedema. 0  -TD      I know what to do to manage my lymphedema 3  -TD         LLIS - Functional Concerns    Lymphedema affects my ability to perform self-care activities (i.e. eating, dressing, hygiene) 0  -TD      Lymphedema affects my ability to perform routine home or work-related activities. 0  -TD      Lymphedema affects my performance of preferred leisure activities. 0  -TD      Lymphedema affects proper fit of clothing/shoes 0  -TD      Lymphedema affects my sleep 0  -TD         Posture/Observations    Posture- WNL Posture is WNL  -TD         General ROM    RT Upper Ext Rt Shoulder ABduction;Rt Shoulder Flexion  -TD         Right Upper Ext    Rt Shoulder Abduction AROM 185  -TD      Rt Shoulder Flexion AROM 185  -TD         MMT (Manual Muscle Testing)    General MMT Comments BUE are WFL  -TD         Skin Changes/Observations    Location/Assessment Upper Quadrant  -TD      Upper Quadrant Conditions right:;normal;intact;clean  -TD      Upper Quadrant Color/Pigment left:;normal  -TD      Skin Observations Comment Bruises have resolved and is feeling much better  -TD         L-Dex Bioimpedence Screening    L-Dex Measurement Extremity RUE  -TD      L-Dex Patient Position Standing  -TD      L-Dex UE Dominate Side Right  -TD      L-Dex UE At Risk Side Right  -TD      L-Dex UE " Pre Surgical Value Yes  -TD      L-Dex UE Baseline Score 5.6  -TD         Lymphedema Life Impact Scale Totals    A.  Total Q1 - Q17 (Do not include Q18) 3  -TD      B.  Total number of questions answered (Q1-Q17) 17  -TD      C. Divide A by B 0.18  -TD      D. Multiple C by 25 4.5  -TD                User Key  (r) = Recorded By, (t) = Taken By, (c) = Cosigned By      Initials Name Provider Type    Lauern Adan OT Occupational Therapist                    The patient had a follow up  SOZO measurement which I reviewed today. The score is   WFL, see scanned to EMR. Bioimpedance spectroscopy helps identify the   onset of lymphedema in an arm or leg before patients experience noticeable swelling. Research has   shown that 92% of patients with early detection of lymphedema using L-Dex combined with   intervention do not progress to chronic lymphedema through three years. Additionally, as of March 2023, the NCCN Guidelines® for Survivorship recommend proactive screening for lymphedema using   bioimpedance spectroscopy. Whenever possible, patients are tested for baseline L-Dex score before   cancer treatment begins and then are reassessed during regular follow-up visits using the SOZO device.   Otherwise, this can be started postoperatively and continued during regular follow-up visits. If the   patient’s L-Dex score increases above normal levels, that is a sign that lymphedema is developing and a   referral is made to physical therapy for further evaluation and early compression treatment.   Lymphedema assessment with the SOZO L-Dex score is recommended to be done every 3 months for   the first 3 years and then every 6 months for years 4 and 5 followed by annually afterwards         Therapy Education  Education Details: Reviewed exercises and stretches.  Given: HEP, Symptoms/condition management, Edema management, Pain management  Program: Reinforced  How Provided: Verbal, Demonstration  Provided to: Patient  Level  of Understanding: Teach back education performed, Verbalized, Demonstrated  52478 - OT Self Care/Mgmt Minutes: 15         OT Goals       Row Name 12/12/24 1223          Time Calculation    OT Goal Re-Cert Due Date 01/11/25  -TD               User Key  (r) = Recorded By, (t) = Taken By, (c) = Cosigned By      Initials Name Provider Type    TD Lauren Peacock, OT Occupational Therapist                1. Post Breast Surgery Care/at risk for Lymphedema  LTG 1: 90 days:  As an indicator of no exacerbation of lymphedema staging, the patient will present with an L-Dex score less than [10] points from preoperative baseline.              STATUS: on going  STG 1a:   30 days: To prevent exacerbation of mixed edema to lymphedema, patient will utilize the 2 postsurgical compression garments daily.                 STATUS: on going  STG 1b: 30 days: Patient will be independent with self-manual lymphatic massage.               STATUS: on going  STG 1c: 30 days:  Patient will be independent with identification of signs and symptoms of lymphedema exasperation per stoplight to recovery education handout.              STATUS: on going  STG 1 d: 30 days: Patient will be independent with HEP to prevent advancement in lymphedema staging.              STATUS: on going  TREATMENT:  Self Care/ADL retraining, Therapeutic Activity, Neuromuscular Re-education, Therapeutic Exercise, Bioimpedence Fluid Analysis, Post-Surgical compression garement [id number] [facility]/ Rosanna Camisole Kit 2860K, Orthotic Management and training,  and Manual Therapy.     OT Assessment/Plan       Row Name 12/12/24 0216          OT Assessment    Functional Limitations Limitations in functional capacity and performance  -TD     Impairments Impaired lymphatic circulation  -TD     Assessment Comments Deanne is a 79-year-old female with a recent diagnosis of invasive ductal carcinoma grade 1 ER/IA positive of the right breast.  Pt will be brought in in two weeks to re  evaluate. Patient underwent right-sided mastectomy on November 5, 2024 and had 2 lymphnodes at that time.  Pt has been performing her exercises and is making good progress towards her goals.  She is now WFL for movement and strength.  Pt will be fitted for prosthetics next session.  Patient would benefit from continued skilled occupational therapy to prevent increased staging of lymphedema, increased pain, and decreased range of motion.  -TD     OT Diagnosis at risk for lymphedema  -TD     OT Rehab Potential Good  -TD     Patient/caregiver participated in establishment of treatment plan and goals Yes  -TD     Patient would benefit from skilled therapy intervention Yes  -TD        OT Plan    OT Frequency --  see duration  -TD     Predicted Duration of Therapy Intervention (OT) Pt will be seen in two months for prosthesis fitting.  Patient will be seen every 3 months years 1-3, and every 6 months years 4 and 5.  -TD     Planned CPT's? OT EVAL LOW COMPLEXITY: 75849;OT RE-EVAL: 28370;OT THER ACT EA 15 MIN: 84913CE;OT THER PROC EA 15 MIN: 44944DU;OT SELF CARE/MGMT/TRAIN 15 MIN: 94714;OT MANUAL THERAPY EA 15 MIN: 24856;OT BIS XTRACELL FLUID ANALYSIS: 53429;OT CARE PLAN EA 15 MIN;OT ORTHOTIC MGMT/TRAIN EA 15 MIN: 95376;OT ORTHO/PROSTHET CHECKOUT EA 15 MIN: 99004  -TD     Planned Therapy Interventions (Optional Details) home exercise program;postural re-education;prosthetic fitting/training;patient/family education;orthotic fitting/training;ROM (Range of Motion);strengthening;stretching;manual therapy techniques  -TD     OT Plan Comments continue POC  -TD               User Key  (r) = Recorded By, (t) = Taken By, (c) = Cosigned By      Initials Name Provider Type    Lauren Adan OT Occupational Therapist                              Time Calculation:   Timed Charges  03113 - OT Self Care/Mgmt Minutes: 15  Total Minutes  Timed Charges Total Minutes: 15   Total Minutes: 15     Therapy Charges for Today       Code  Description Service Date Service Provider Modifiers Qty    59019600881 HC OT SELF CARE/MGMT/TRAIN EA 15 MIN 12/12/2024 Lauren Peacock OT GO 1                      Lauren Peacock OT  12/12/2024

## 2024-12-12 NOTE — TELEPHONE ENCOUNTER
Please sign to allow patient to receive breast prosthetics and post mastectomy orthosis. Thank you.

## 2024-12-12 NOTE — PROGRESS NOTES
Chief Complaint  Follow-up    Subjective          Primary Care Follow-Up    The patient is here to follow up on right breast mastectomy with [facility]N.  They are doing well and have no complaints.  Pathology is shown below:    Results for orders placed or performed during the hospital encounter of 11/05/24   ECG 12 Lead Pre-Op / Pre-Procedure    Collection Time: 11/05/24 10:55 AM   Result Value Ref Range    QT Interval 416 ms    QTC Interval 469 ms   POC Glucose Once    Collection Time: 11/05/24 11:16 AM    Specimen: Blood   Result Value Ref Range    Glucose 123 (H) 70 - 99 mg/dL   Tissue Pathology Exam    Collection Time: 11/05/24  4:51 PM    Specimen: A: Breast, Right; Tissue    B: [place] Lymph Node; Tissue    C: [place] Lymph Node; Tissue   Result Value Ref Range    Case Report       Surgical Pathology Report                         Case: [id number]                                  Authorizing Provider:  Cristel Schwartz MD    Collected:           11/05/2024 04:51 PM          Ordering Location:     [facility]  Received:            11/06/2024 09:10 AM                                 OR                                                                           Pathologist:           Michelle Urena DO                                                       Specimens:   1) - Breast, Right, right breast; short stitch superior, long stitch lateral                        2) - [place] Lymph Node, right breast sentinel node; count 972                                     3) - [place] Lymph Node, right breast sentinel node; count 119                            Clinical Information       Malignant neoplasm of right breast in female, estrogen receptor positive, unspecified site of breast      Final Diagnosis       1.  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  Right breast sentinel node count 972, biopsy:     - One lymph node, negative for metastatic carcinoma (0/1)    - See synoptic report      3.  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)       Histologic Grade (Niko Histologic Score):            Glandular (Acinar) / Tubular Differentiation:    Score 3         Nuclear Pleomorphism:    Score 1         Mitotic Rate:    Score 1         Overall Grade:    Grade 1 (scores of 3, 4 or 5)       Tumor Size:    Greatest dimension of largest invasive focus (Millimeters): 27 mm      Ductal Carcinoma In Situ (DCIS):    Present       Lymphatic and / or Vascular Invasion:    Not identified       Treatment Effect in the Breast:    No known presurgical therapy       MARGINS      Margin Status for Invasive Carcinoma:    All margins negative for invasive carcinoma         Distance from Invasive Carcinoma to Closest Margin:    9 mm        Closest Margin(s) to Invasive Carcinoma:    Posterior       Margin Status for DCIS:    All margins negative for DCIS         Distance from DCIS to Closest Margin:    Greater than: 2 mm      REGIONAL LYMPH NODES      Regional Lymph Node Status:            :    All regional lymph nodes negative for tumor         Total Number of Lymph Nodes Examined (sentinel and non-sentinel):    2         Number of Wallace Nodes Examined:    2       pTNM CLASSIFICATION (AJCC 8th Edition)      Reporting of pT, pN, and (when applicable) pM categories is based on information available to the pathologist at the time the report is issued. As per the AJCC (Chapter 1, 8th Ed.) it is the  "managing physician's responsibility to establish the final pathologic stage based upon all pertinent information, including but potentially not limited to this pathology report.      pT Category:    pT2       pN Category:    pN0       N Suffix:    (sn)       SPECIAL STUDIES      Estrogen Receptor (ER) Status:    Positive (greater than 10% of cells demonstrate nuclear positivity)         Percentage of Cells with Nuclear Positivity:    95 %      Progesterone Receptor (PgR) Status:    Positive         Percentage of Cells with Nuclear Positivity:    15 %      HER2 (by immunohistochemistry):    Equivocal (Score 2+)       HER2 (by in situ hybridization):    Negative (not amplified)       Ki-67 Percentage of Positive Nuclei:    12 %      Testing Performed on Case Number:    [id number]       Intraoperative Consultation       2.  Right breast sentinel node count 972, biopsy: Negative for macrometastatic carcinoma.  3.  Right breast sentinel node; count 119, biopsy: Negative for macrometastatic carcinoma.    The above frozen diagnosis was called to Dr. Schwartz at 5:23 PM on 11/5/2024 by Dr. JESSICA Urena.       Gross Description       1. Breast, Right.  Received fresh and subsequently placed in formalin, labeled \"right breast, short stitch superior, long stitch lateral\" is a 542 g, 23.0 x 19.5 x 3.5 cm right simple mastectomy specimen with 2 stitches.  The anterior aspect (inked blue) displays a 20.0 x 10.0 cm skin ellipse with a 3.5 x 3.0 cm tan, wrinkled areola and a 1.0 x 0.7 cm tan, flat, inverted nipple.  The posterior aspect (inked black) displays tan-pink and pink, shaggy connective tissue over lobulated adipose tissue; no muscular tissue is grossly appreciated.  Serially sectioning from medial to lateral reveals a 7.0 x 5.5 x 2.5 cm tan, indurated, ill-defined fibrous mass containing 2 coiled metallic biopsy clips from 11:00-1:00 across the inner and outer upper quadrants and extending into the lower quadrants.  The " "mass is located 0.5 cm from the nearest posterior margin, 1.5 cm of the nearest anterosuperior margin, and 3.0 cm of the nipple.  The remaining uninvolved parenchyma includes 15% white fibrous tissue and 85% lobulated adipose tissue.  Representative sections are submitted as follows: 1A-mass with 11:00 biopsy cavity and posterior margin; 1B-11:00 biopsy cavity; 1C-1D-bisected slice of mass from 11:00; 1E- 1F-bisected slice of mass with 12:00 biopsy cavity and anterosuperior margin; 6Q-7D-yqubvhzcm of 12:00 biopsy cavity; 1I-mass with anterosuperior margin; 1J-additional section of mass; 1P-5A-qnvovgmf mass with anteroinferior margin; 1N-fibrous tissue from upper inner quadrant; 1O-fibrous tissue from lower inner quadrant; 1P-fibrous tissue from upper outer quadrant; 1Q-fibrous tissue from lower outer quadrant; 1R-nipple.  Cold ischemic time-25 minutes; total formalin fixation time-26 hours and 44 minutes.  Additional sections of the anterior/posterior margins from the lower outer quadrant are submitted in cassettes 1S-1T, respectively.  11/7/2024 JOSEPHINE  2. [place] Lymph Node.  Received fresh for intraoperative consultation (frozen section performed) labeled \"right breast sentinel node count 972\" is a 2.0 cm lymph node encased in adipose tissue.  The lymph node is entirely frozen and subsequently submitted in 1 cassette.    3. [place] Lymph Node.  Received fresh for intraoperative consultation (frozen section performed) labeled \"right breast sentinel node count 119\" is a 2.3 cm lymph node encased in adipose tissue.  The lymph node is entirely frozen and subsequently submitted in 1 cassette.        Special Stains       Immunohistochemical stains for CK7 are performed on blocks 2A and 3A and are negative for metastatic carcinoma.All immunohistochemical/cytochemical stains (IHC) are performed on separate slides per different antibody unless otherwise specified in the documentation that a cocktail (multiple stain) was " performed.  Controls are appropriate.        Microscopic Description       Microscopic examination performed.     POC Glucose STAT    Collection Time: 11/05/24  6:18 PM    Specimen: Blood   Result Value Ref Range    Glucose 136 (H) 70 - 99 mg/dL   POC Glucose 4x Daily Before Meals & at Bedtime    Collection Time: 11/06/24 11:45 AM    Specimen: Blood   Result Value Ref Range    Glucose 147 (H) 70 - 99 mg/dL        Objective   Vital Signs:   There were no vitals taken for this visit.    Physical Exam  Vitals and nursing note reviewed.   Constitutional:       General: She is not in acute distress.     Appearance: Normal appearance. She is well-developed.   HENT:      Head: Normocephalic and atraumatic.   Eyes:      Extraocular Movements: Extraocular movements intact.      Pupils: Pupils are equal, round, and reactive to light.   Cardiovascular:      Pulses: Normal pulses.   Pulmonary:      Effort: Pulmonary effort is normal. No retractions.      Breath sounds: Normal air entry. No wheezing.   Abdominal:      General: There is no distension.      Palpations: Abdomen is soft.      Tenderness: There is no abdominal tenderness.      Hernia: No hernia is present.   Musculoskeletal:         General: No swelling or deformity.      Cervical back: Neck supple.   Skin:     General: Skin is warm and dry.      Findings: No erythema.      Comments: Surgical Incision Healing Well   Neurological:      General: No focal deficit present.      Mental Status: She is alert and oriented to person, place, and time.      Motor: Motor function is intact.   Psychiatric:         Mood and Affect: Mood normal.         Thought Content: Thought content normal.           Result Review :                 Assessment and Plan    Diagnoses and all orders for this visit:    1. Malignant neoplasm of right breast in female, estrogen receptor positive, unspecified site of breast (Primary)    Followup in one year  Keep apt with oncology    Follow Up   Return  in about 1 year (around 12/12/2025).  Patient was given instructions and counseling regarding her condition or for health maintenance advice. Please see specific information pulled into the AVS if appropriate.

## 2024-12-16 ENCOUNTER — CLINICAL SUPPORT (OUTPATIENT)
Dept: GENETICS | Facility: HOSPITAL | Age: 79
End: 2024-12-16
Payer: MEDICARE

## 2024-12-16 DIAGNOSIS — C50.911 MALIGNANT NEOPLASM OF RIGHT BREAST IN FEMALE, ESTROGEN RECEPTOR POSITIVE, UNSPECIFIED SITE OF BREAST: ICD-10-CM

## 2024-12-16 DIAGNOSIS — Z17.0 MALIGNANT NEOPLASM OF RIGHT BREAST IN FEMALE, ESTROGEN RECEPTOR POSITIVE, UNSPECIFIED SITE OF BREAST: ICD-10-CM

## 2024-12-16 DIAGNOSIS — Z80.0 FAMILY HISTORY OF COLON CANCER: ICD-10-CM

## 2024-12-16 DIAGNOSIS — Z13.79 GENETIC TESTING: Primary | ICD-10-CM

## 2024-12-16 DIAGNOSIS — Z80.3 FAMILY HISTORY OF MALIGNANT NEOPLASM OF BREAST: ICD-10-CM

## 2024-12-23 NOTE — PROGRESS NOTES
Genetic counseling was provided via telemedicine at [facility].     Deanne Pittman, a 79 y.o. female, was referred for genetic counseling due to a personal history of breast cancer and to discuss results of recent genetic testing. She was recently diagnosed with breast cancer at age 78 and underwent a right mastectomy. Ms. Pittman has had a hysterectomy; her ovaries remain intact. She does not report a personal history of colon polyps. Ms. Pittman's oncologist ordered genetic testing due to her personal and family history of breast cancer. Testing was ordered via the Hereditary Breast and Gyn Cancers Guidelines-Based panel at [facility]. 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 Ms. Pittman was interested in discussing these results further.     FAMILY HISTORY:  Sister:      Breast cancer, late 50s  Sister:      Breast cancer, 46; Rectal cancer, 62  Mat. Uncle (x3):    Unknown cancer type  Mat. 1st cousin once removed:  Breast cancer, late 60s  Pat. 1st cousin (x2):    Unknown cancer type    We do not have medical records confirming the diagnoses in Ms. Pittman's family.    GENETIC COUNSELING:  We reviewed the family history information in detail.  Cases of cancer follow three general patterns: sporadic, familial, and hereditary.  While most cancer is sporadic, some cases appear to occur in family clusters.  These cases are said to be familial and account for 10-20% of certain cancer cases.  Familial cases may be due to a combination of shared genes and environmental factors among family members.  In even fewer families (~10%), the cancer is said to be inherited, and the genes responsible for the cancer are known.      Family histories typical of hereditary cancer syndromes usually include multiple first- and second-degree relatives diagnosed with cancer types that define a syndrome.  These cases tend to be diagnosed at younger-than-expected ages and can be bilateral  or multifocal.  The cancer in these families follows an autosomal dominant inheritance pattern, which indicates the likely presence of a mutation in a cancer susceptibility gene.  Children and siblings of an individual believed to carry this mutation have a 50% chance of inheriting that mutation, thereby inheriting the increased risk to develop cancer.  These mutations can be passed down from the maternal or the paternal lineage.    Hereditary breast cancer accounts for approximately 10% of all cases of breast cancer.  A significant proportion can be attributed to mutations in the BRCA1 and BRCA2 genes. Mutations in these genes confer an increased risk for breast cancer, ovarian cancer, male breast cancer, prostate cancer and pancreatic cancer.  However, there are other genes in addition to BRCA1/2 known to contribute to cancer risk.  We discussed the panel testing that was already completed for Ms. Pittman, which involved testing for BRCA1/2, as well as 17 additional genes that are associated with hereditary breast and gynecologic cancer risk.     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  This result is consistent with hereditary risk for breast cancer. Genetic testing for this finding is indicated for other family members to determine whether they are at an increased risk for breast cancer. Ms. Pittman's siblings and children each have a 50% chance of having inherited this mutation.    Until each at-risk family member has been proven not to carry this mutation in BARD1, he or she should be offered increased surveillance. We would be happy to see family members in our clinic to further discuss this information and testing options. They can request a referral to [facility], and our coordinator will contact the individual to schedule an appointment once the referral is received. They can call [phone] to receive more information on  how to place the referral. For family members who live elsewhere, there are genetic counselors at most [facility] centers. They can find a genetic counselor by visiting the National Society of Genetic Counselors website at www.nsgc.org or they can call our office and we would be happy to give them the contact information of the closest genetic counselor.     CANCER RISK:  Females with a pathogenic BARD1 mutation have an increased risk of developing breast cancer, with a lifetime risk of 17-30% (2024 NCCN Guidelines), in comparison to the general population risk of 12.5%. Currently, there is no evidence to suggest an increased risk for other types of cancers outside of breast cancer.  This is a more recently described gene, and it is possible the risks or screening guidelines may change in the future as more research is done.     CANCER SCREENING:  Current guidelines set by the National Comprehensive Cancer Network (NCCN) state that increased breast cancer screening is warranted for individuals with a BARD1 mutation.  Increased screening for this gene includes an annual mammogram and annual breast MRI starting at age 40, or 10 years younger than the earliest diagnosis in the family, whichever is earliest. The NCCN guidelines state that there is not currently evidence to make a recommendation for prophylactic risk reducing mastectomy for BARD1 mutation carriers. However, depending on family history, preventative bilateral mastectomy is still considered by some women that test positive for a BARD1 mutation, and this reduces breast cancer risk by approximately 90%.  Breast cancer chemoprevention is another option that can be considered for women at an elevated risk for breast cancer.      PLAN:  Genetic counseling remains available to Ms. Pittman and her family. She plans on sharing this information with her children and siblings so they can undergo genetic testing. If she has any questions, she is welcome to contact  us at [phone].      Lesli Ennis MS, JD [facility], [facility]  Licensed Certified Genetic Counselor      Cc:  Darby Cole MD

## 2024-12-27 ENCOUNTER — OFFICE VISIT (OUTPATIENT)
Dept: FAMILY MEDICINE CLINIC | Facility: CLINIC | Age: 79
End: 2024-12-27
Payer: MEDICARE

## 2024-12-27 VITALS
OXYGEN SATURATION: 97 % | BODY MASS INDEX: 22.87 KG/M2 | HEART RATE: 130 BPM | TEMPERATURE: 100.7 F | DIASTOLIC BLOOD PRESSURE: 78 MMHG | SYSTOLIC BLOOD PRESSURE: 124 MMHG | WEIGHT: 121 LBS

## 2024-12-27 DIAGNOSIS — R50.9 FEVER, UNSPECIFIED FEVER CAUSE: ICD-10-CM

## 2024-12-27 DIAGNOSIS — J06.9 UPPER RESPIRATORY TRACT INFECTION DUE TO COVID-19 VIRUS: Primary | ICD-10-CM

## 2024-12-27 DIAGNOSIS — R52 BODY ACHES: ICD-10-CM

## 2024-12-27 DIAGNOSIS — R05.9 COUGH, UNSPECIFIED TYPE: ICD-10-CM

## 2024-12-27 DIAGNOSIS — U07.1 UPPER RESPIRATORY TRACT INFECTION DUE TO COVID-19 VIRUS: Primary | ICD-10-CM

## 2024-12-27 PROCEDURE — 1126F AMNT PAIN NOTED NONE PRSNT: CPT | Performed by: STUDENT IN AN ORGANIZED HEALTH CARE EDUCATION/TRAINING PROGRAM

## 2024-12-27 PROCEDURE — 99213 OFFICE O/P EST LOW 20 MIN: CPT | Performed by: STUDENT IN AN ORGANIZED HEALTH CARE EDUCATION/TRAINING PROGRAM

## 2024-12-27 PROCEDURE — 3078F DIAST BP <80 MM HG: CPT | Performed by: STUDENT IN AN ORGANIZED HEALTH CARE EDUCATION/TRAINING PROGRAM

## 2024-12-27 PROCEDURE — 87428 SARSCOV & INF VIR A&B AG IA: CPT | Performed by: STUDENT IN AN ORGANIZED HEALTH CARE EDUCATION/TRAINING PROGRAM

## 2024-12-27 PROCEDURE — 1160F RVW MEDS BY RX/DR IN RCRD: CPT | Performed by: STUDENT IN AN ORGANIZED HEALTH CARE EDUCATION/TRAINING PROGRAM

## 2024-12-27 PROCEDURE — 3074F SYST BP LT 130 MM HG: CPT | Performed by: STUDENT IN AN ORGANIZED HEALTH CARE EDUCATION/TRAINING PROGRAM

## 2024-12-27 PROCEDURE — 1159F MED LIST DOCD IN RCRD: CPT | Performed by: STUDENT IN AN ORGANIZED HEALTH CARE EDUCATION/TRAINING PROGRAM

## 2024-12-27 RX ORDER — IPRATROPIUM BROMIDE 42 UG/1
2 SPRAY, METERED NASAL 4 TIMES DAILY
Qty: 15 ML | Refills: 0 | Status: SHIPPED | OUTPATIENT
Start: 2024-12-27 | End: 2025-01-11

## 2024-12-27 RX ORDER — GUAIFENESIN AND DEXTROMETHORPHAN HYDROBROMIDE 600; 30 MG/1; MG/1
1 TABLET, EXTENDED RELEASE ORAL 2 TIMES DAILY PRN
Qty: 30 TABLET | Refills: 0 | Status: SHIPPED | OUTPATIENT
Start: 2024-12-27 | End: 2025-01-11

## 2024-12-27 RX ORDER — METHYLPREDNISOLONE 4 MG/1
TABLET ORAL
Qty: 21 EACH | Refills: 0 | Status: SHIPPED | OUTPATIENT
Start: 2024-12-27

## 2024-12-27 RX ORDER — AZITHROMYCIN 250 MG/1
TABLET, FILM COATED ORAL
Qty: 6 TABLET | Refills: 0 | Status: SHIPPED | OUTPATIENT
Start: 2024-12-27 | End: 2024-12-27 | Stop reason: ALTCHOICE

## 2024-12-27 NOTE — PROGRESS NOTES
Chief Complaint  Fever, Headache, Sore Throat, and Cough    Subjective      Deannehans Pittman is a 79 y.o. female who presents to [facility] with complaints consistent with acute illness. Has had a fever (currently temp is 100.7), along with a cough, headache, and sore throat. She also notes some post nasal drainage and sometimes will cough up mucus. She is not significantly short of breath.       Objective   Vital Signs:   Vitals:    12/27/24 1416   BP: 124/78   Pulse: (!) 130   Temp: (!) 100.7 °F (38.2 °C)   SpO2: 97%   Weight: 54.9 kg (121 lb)     Body mass index is 22.87 kg/m².    Wt Readings from Last 3 Encounters:   12/27/24 54.9 kg (121 lb)   12/12/24 56 kg (123 lb 8 oz)   11/21/24 56.8 kg (125 lb 4.8 oz)     BP Readings from Last 3 Encounters:   12/27/24 124/78   12/12/24 (!) 117/105   11/06/24 95/65       Health Maintenance   Topic Date Due    RSV Vaccine - Adults (1 - 1-dose 75+ series) Never done    INFLUENZA VACCINE  07/01/2024    COVID-19 Vaccine (5 - 2024-25 season) 09/01/2024    HEMOGLOBIN A1C  12/19/2024    DIABETIC EYE EXAM  03/22/2025    LIPID PANEL  06/19/2025    DXA SCAN  07/07/2025    ANNUAL WELLNESS VISIT  07/25/2025    LUNG CANCER SCREENING  07/26/2025    TDAP/TD VACCINES (2 - Td or Tdap) 01/18/2029    HEPATITIS C SCREENING  Completed    Pneumococcal Vaccine 65+  Completed    ZOSTER VACCINE  Completed    MAMMOGRAM  Discontinued    URINE MICROALBUMIN  Discontinued       Physical Exam  HENT:      Head: Normocephalic and atraumatic.      Ears:      Comments: Moderate middle ear effusion bilateral     Nose: Congestion present.      Mouth/Throat:      Pharynx: Posterior oropharyngeal erythema present. No oropharyngeal exudate.   Cardiovascular:      Rate and Rhythm: Regular rhythm. Tachycardia present.   Pulmonary:      Effort: Pulmonary effort is normal.      Breath sounds: Normal breath sounds.   Neurological:      Mental Status: She is alert.          Result  Review :  The following data was reviewed by: Dinorah Chauhan DO on 12/27/2024:       Office Visit on 12/27/2024   Component Date Value    SARS Antigen 12/27/2024 Detected (A)     Influenza A Antigen DELFINO 12/27/2024 Not Detected     Influenza B Antigen DELFINO 12/27/2024 Not Detected     Internal Control 12/27/2024 Passed     Lot Number 12/27/2024 [id number]     Expiration Date 12/27/2024 11/18/25        Procedures          ASSESSMENT/PLAN  Diagnoses and all orders for this visit:    1. Upper respiratory tract infection due to COVID-19 virus (Primary)  -     Nirmatrelvir & Ritonavir, 300mg/100mg, (PAXLOVID); Take 2 tablets by mouth 2 (Two) Times a Day.  Dispense: 30 tablet; Refill: 0  -     ipratropium (ATROVENT) 0.06 % nasal spray; Administer 2 sprays into the nostril(s) as directed by provider 4 (Four) Times a Day for 15 days.  Dispense: 15 mL; Refill: 0  -     methylPREDNISolone (MEDROL) 4 MG dose pack; Take as directed on package instructions.  Dispense: 21 each; Refill: 0    2. Fever, unspecified fever cause  -     POCT SARS-CoV-2 + Flu Antigen DELFINO    3. Cough, unspecified type  -     POCT SARS-CoV-2 + Flu Antigen DELFINO    4. Body aches  -     POCT SARS-CoV-2 + Flu Antigen DELFINO        Positive for COVID-19 in office today.  Due to patient's age and comorbidities we will start renally adjusted Paxlovid.  Discussed possibility of rebound COVID.  Will also send in prescription for Mucinex DM to act as an expectorant.  She should quarantine at least until she has been fever free for 24 hours.  Continue antipyretics as needed.             FOLLOW UP  Return if symptoms worsen or fail to improve, for Next scheduled follow up.  Patient was given instructions and counseling regarding her condition or for health maintenance advice. Please see specific information pulled into the AVS if appropriate.       Dinorah Chauhan DO  12/30/24  16:56 EST

## 2025-01-05 DIAGNOSIS — N18.30 CONTROLLED TYPE 2 DIABETES MELLITUS WITH STAGE 3 CHRONIC KIDNEY DISEASE, WITHOUT LONG-TERM CURRENT USE OF INSULIN: ICD-10-CM

## 2025-01-05 DIAGNOSIS — E11.22 CONTROLLED TYPE 2 DIABETES MELLITUS WITH STAGE 3 CHRONIC KIDNEY DISEASE, WITHOUT LONG-TERM CURRENT USE OF INSULIN: ICD-10-CM

## 2025-01-09 DIAGNOSIS — C50.919 INVASIVE CARCINOMA OF BREAST: ICD-10-CM

## 2025-01-09 RX ORDER — ANASTROZOLE 1 MG/1
1 TABLET ORAL DAILY
Qty: 14 TABLET | Refills: 0 | Status: SHIPPED | OUTPATIENT
Start: 2025-01-09

## 2025-01-13 ENCOUNTER — LAB (OUTPATIENT)
Dept: LAB | Facility: HOSPITAL | Age: 80
End: 2025-01-13
Payer: MEDICARE

## 2025-01-13 DIAGNOSIS — C50.919 INVASIVE CARCINOMA OF BREAST: ICD-10-CM

## 2025-01-13 DIAGNOSIS — E55.9 VITAMIN D DEFICIENCY: ICD-10-CM

## 2025-01-13 LAB
25(OH)D3 SERPL-MCNC: 26.7 NG/ML (ref 30–100)
ALBUMIN SERPL-MCNC: 4.4 G/DL (ref 3.5–5.2)
ALBUMIN/GLOB SERPL: 1.8 G/DL
ALP SERPL-CCNC: 74 U/L (ref 39–117)
ALT SERPL W P-5'-P-CCNC: 13 U/L (ref 1–33)
ANION GAP SERPL CALCULATED.3IONS-SCNC: 11.1 MMOL/L (ref 5–15)
AST SERPL-CCNC: 15 U/L (ref 1–32)
BASOPHILS # BLD AUTO: 0.08 10*3/MM3 (ref 0–0.2)
BASOPHILS NFR BLD AUTO: 0.9 % (ref 0–1.5)
BILIRUB SERPL-MCNC: 1.1 MG/DL (ref 0–1.2)
BUN SERPL-MCNC: 15 MG/DL (ref 8–23)
BUN/CREAT SERPL: 15 (ref 7–25)
CALCIUM SPEC-SCNC: 9.5 MG/DL (ref 8.6–10.5)
CHLORIDE SERPL-SCNC: 103 MMOL/L (ref 98–107)
CO2 SERPL-SCNC: 25.9 MMOL/L (ref 22–29)
CREAT SERPL-MCNC: 1 MG/DL (ref 0.57–1)
DEPRECATED RDW RBC AUTO: 48.9 FL (ref 37–54)
EGFRCR SERPLBLD CKD-EPI 2021: 57.8 ML/MIN/1.73
EOSINOPHIL # BLD AUTO: 0.13 10*3/MM3 (ref 0–0.4)
EOSINOPHIL NFR BLD AUTO: 1.5 % (ref 0.3–6.2)
ERYTHROCYTE [DISTWIDTH] IN BLOOD BY AUTOMATED COUNT: 13.7 % (ref 12.3–15.4)
GLOBULIN UR ELPH-MCNC: 2.4 GM/DL
GLUCOSE SERPL-MCNC: 110 MG/DL (ref 65–99)
HCT VFR BLD AUTO: 42.7 % (ref 34–46.6)
HGB BLD-MCNC: 13.1 G/DL (ref 12–15.9)
IMM GRANULOCYTES # BLD AUTO: 0.03 10*3/MM3 (ref 0–0.05)
IMM GRANULOCYTES NFR BLD AUTO: 0.3 % (ref 0–0.5)
LYMPHOCYTES # BLD AUTO: 1.88 10*3/MM3 (ref 0.7–3.1)
LYMPHOCYTES NFR BLD AUTO: 21.1 % (ref 19.6–45.3)
MCH RBC QN AUTO: 29.8 PG (ref 26.6–33)
MCHC RBC AUTO-ENTMCNC: 30.7 G/DL (ref 31.5–35.7)
MCV RBC AUTO: 97.3 FL (ref 79–97)
MONOCYTES # BLD AUTO: 0.67 10*3/MM3 (ref 0.1–0.9)
MONOCYTES NFR BLD AUTO: 7.5 % (ref 5–12)
NEUTROPHILS NFR BLD AUTO: 6.14 10*3/MM3 (ref 1.7–7)
NEUTROPHILS NFR BLD AUTO: 68.7 % (ref 42.7–76)
NRBC BLD AUTO-RTO: 0 /100 WBC (ref 0–0.2)
PLATELET # BLD AUTO: 331 10*3/MM3 (ref 140–450)
PMV BLD AUTO: 11.8 FL (ref 6–12)
POTASSIUM SERPL-SCNC: 4.4 MMOL/L (ref 3.5–5.2)
PROT SERPL-MCNC: 6.8 G/DL (ref 6–8.5)
RBC # BLD AUTO: 4.39 10*6/MM3 (ref 3.77–5.28)
SODIUM SERPL-SCNC: 140 MMOL/L (ref 136–145)
WBC NRBC COR # BLD AUTO: 8.93 10*3/MM3 (ref 3.4–10.8)

## 2025-01-13 PROCEDURE — 82306 VITAMIN D 25 HYDROXY: CPT

## 2025-01-13 PROCEDURE — 80053 COMPREHEN METABOLIC PANEL: CPT

## 2025-01-13 PROCEDURE — 85025 COMPLETE CBC W/AUTO DIFF WBC: CPT

## 2025-01-13 PROCEDURE — 36415 COLL VENOUS BLD VENIPUNCTURE: CPT

## 2025-01-17 DIAGNOSIS — E78.2 MIXED HYPERLIPIDEMIA: ICD-10-CM

## 2025-01-17 RX ORDER — ATORVASTATIN CALCIUM 20 MG/1
20 TABLET, FILM COATED ORAL
Qty: 90 TABLET | Refills: 0 | OUTPATIENT
Start: 2025-01-17

## 2025-01-21 ENCOUNTER — TELEPHONE (OUTPATIENT)
Dept: ONCOLOGY | Facility: HOSPITAL | Age: 80
End: 2025-01-21

## 2025-01-21 ENCOUNTER — DOCUMENTATION (OUTPATIENT)
Dept: ONCOLOGY | Facility: HOSPITAL | Age: 80
End: 2025-01-21

## 2025-01-21 ENCOUNTER — OFFICE VISIT (OUTPATIENT)
Dept: ONCOLOGY | Facility: HOSPITAL | Age: 80
End: 2025-01-21
Payer: MEDICARE

## 2025-01-21 VITALS
SYSTOLIC BLOOD PRESSURE: 95 MMHG | DIASTOLIC BLOOD PRESSURE: 63 MMHG | OXYGEN SATURATION: 92 % | TEMPERATURE: 97 F | HEIGHT: 61 IN | HEART RATE: 76 BPM | WEIGHT: 124.4 LBS | BODY MASS INDEX: 23.49 KG/M2 | RESPIRATION RATE: 18 BRPM

## 2025-01-21 DIAGNOSIS — C50.919 INVASIVE CARCINOMA OF BREAST: ICD-10-CM

## 2025-01-21 DIAGNOSIS — E55.9 VITAMIN D DEFICIENCY: Primary | ICD-10-CM

## 2025-01-21 PROCEDURE — G0463 HOSPITAL OUTPT CLINIC VISIT: HCPCS | Performed by: INTERNAL MEDICINE

## 2025-01-21 RX ORDER — ANASTROZOLE 1 MG/1
1 TABLET ORAL DAILY
Qty: 90 TABLET | Refills: 0 | Status: SHIPPED | OUTPATIENT
Start: 2025-01-21

## 2025-01-21 RX ORDER — ERGOCALCIFEROL 1.25 MG/1
50000 CAPSULE, LIQUID FILLED ORAL
Qty: 8 CAPSULE | Refills: 0 | Status: SHIPPED | OUTPATIENT
Start: 2025-01-21

## 2025-01-21 RX ORDER — ANASTROZOLE 1 MG/1
1 TABLET ORAL DAILY
Qty: 30 TABLET | Refills: 2 | Status: SHIPPED | OUTPATIENT
Start: 2025-01-21

## 2025-01-21 NOTE — TELEPHONE ENCOUNTER
LVM informing patient that prescribed anastrozole has been called into both [facility] and [facility] as requested. Informed pt she is to start 90 day supply of anastrozole from [facility] once initial 30 days supply of anastrozole obtained from [facility] [facility] has been completed. Left clinic phone number for pt to return call for any further questions or concerns.

## 2025-01-21 NOTE — PROGRESS NOTES
Bryon barrios test (she is just here for toxicity check with anastrozole lab at Chief Complaint/Care Team   No chief complaint on file.    Huyen Damon, A*  Huyen Damon, APRN    History of Present Illness     Diagnosis: Right HR + Breast cancer, Invasive carcinoma no special type (ductal), grade 1, ER +95%, CT +50%, HER2 equivocal by IHC 2+, HER2 FISH was negative, pT2, pN0, Oncotype DX score of 15    Current Treatment:  Anastrozole began 12/2024    Previous Treatment:   -Right Breast Biopsy  -Status post right breast mastectomy on 11/5/2024 performed Dr. Cristel Alicea Mariya Pittman is a 79 y.o. female who presents to [facility]Y for Right HR + Breast cancer, cT1,cN0Mx diagnosed 9/23/2024.    Patient underwent screening mammogram on 7/26/2024, which revealed questionable architectural distortion right breast, she subsequently underwent diagnostic mammogram on 9/3/2024 which revealed high suspicious mass in the right breast measuring 6 mm on mammogram corresponding to architectural distortion in the right breast at 12:00, also incidentally seen was a smoothly marginated hypoechoic 6 mm mass at 11:00.    Pathology report from right breast biopsy at 12 o'clock position on 9/23/2024 revealed at 11 o'clock position, 4 cm from nipple, invasive carcinoma no special type (ductal), grade 1, ER +95%, CT +50%, HER2 equivocal by IHC 2+, HER2 FISH was negative.    Past medical history: Type 2 diabetes, CKD stage III, hyperlipidemia    Social history: Quit smoking tobacco in 2018, used to smoke half a pack per day for 30 to 40 years    Family history: Patient reports 2 sisters with breast cancer diagnosed in their 40s in 1987, reports 1 sister also head rectal cancer along with breast cancer.      Interval history: Patient to follow-up regarding adjuvant endocrine treatment after undergoing right mastectomy in November 2024.  Patient has recovered well from surgery.  "Due to renal impairment, patient follows with nephrologist. Pt denies any side effects to Anastrozole.   History of Present Illness         Review of Systems   All other systems reviewed and are negative.       Oncology/Hematology History    No history exists.       Objective     Vitals:    01/21/25 1136   BP: 95/63   Pulse: 76   Resp: 18   Temp: 97 °F (36.1 °C)   TempSrc: Temporal   SpO2: 92%   Weight: 56.4 kg (124 lb 6.4 oz)   Height: 154.9 cm (60.98\")   PainSc: 0-No pain         ECOG score: 0         PHQ-9 Total Score:         Physical Exam  Vitals reviewed. Exam conducted with a chaperone present.   Constitutional:       General: She is not in acute distress.     Appearance: Normal appearance.   HENT:      Head: Normocephalic and atraumatic.   Eyes:      Extraocular Movements: Extraocular movements intact.      Conjunctiva/sclera: Conjunctivae normal.   Pulmonary:      Effort: Pulmonary effort is normal.   Musculoskeletal:      Cervical back: Normal range of motion and neck supple.   Skin:     General: Skin is warm and dry.      Findings: No bruising.   Neurological:      Mental Status: She is oriented to person, place, and time.         Physical Exam        Past Medical History     Past Medical History:   Diagnosis Date    Cancer     BREAST    Cardiomyopathy     PT STATES R/T TO PAST MEDS/NO ISSUES PER PATIENT, FOLLOWS W/PCP-B. VESSELS    CKD (chronic kidney disease) stage 3, GFR 30-59 ml/min     FOLLOWS W/ TICO GR    Diabetes mellitus     Hyperlipidemia     Renal cyst     right    S/P right mastectomy 11/06/2024     Current Outpatient Medications on File Prior to Visit   Medication Sig Dispense Refill    atorvastatin (LIPITOR) 20 MG tablet TAKE 1 TABLET BY MOUTH AT NIGHT 90 tablet 0    carvedilol (COREG) 6.25 MG tablet Take 1 tablet by mouth 2 (two) times a day.      cyanocobalamin (VITAMIN B-12) 1000 MCG tablet Take 1 tablet by mouth.      fluticasone (FLONASE) 50 MCG/ACT nasal spray 2 sprays into the " nostril(s) as directed by provider Daily. 48 g 3    losartan (COZAAR) 25 MG tablet Take 1 tablet by mouth Daily.      metFORMIN (GLUCOPHAGE) 500 MG tablet TAKE 1 TABLET BY MOUTH TWICE  DAILY WITH MEALS 180 tablet 0    Nirmatrelvir & Ritonavir, 300mg/100mg, (PAXLOVID) Take 2 tablets by mouth 2 (Two) Times a Day. 30 tablet 0    [DISCONTINUED] anastrozole (ARIMIDEX) 1 MG tablet Take 1 tablet by mouth Daily. This is to get the patient through until her follow up visit 14 tablet 0    ipratropium (ATROVENT) 0.06 % nasal spray Administer 2 sprays into the nostril(s) as directed by provider 4 (Four) Times a Day for 15 days. 15 mL 0    methylPREDNISolone (MEDROL) 4 MG dose pack Take as directed on package instructions. (Patient not taking: Reported on 1/21/2025) 21 each 0     No current facility-administered medications on file prior to visit.      Allergies   Allergen Reactions    Penicillins Unknown - Low Severity     Beta lactam allergy details  Antibiotic reaction: unknown  Age at reaction: adult  Dose to reaction time: days  Reason for antibiotic: unknown  Epinephrine required for reaction?: no  Tolerated antibiotics: unknown       Farxiga [Dapagliflozin] Headache     Past Surgical History:   Procedure Laterality Date    BREAST SURGERY Left     lump removed    COLON SURGERY      COLOSTOMY AND REVERSAL    LIVER SURGERY      exploritory surgery due to puncture to liver     MASTECTOMY WITH SENTINEL NODE BIOPSY AND AXILLARY NODE DISSECTION Right 11/5/2024    Procedure: RIGHT BREAST MASTECTOMY WITH RIGHT SENTINEL NODE BIOPSY AND AXILLARY NODE DISSECTION;  Surgeon: Cristel Schwartz MD;  [facility]: [facility];  Service: General;  Laterality: Right;    SUBTOTAL HYSTERECTOMY       Social History     Socioeconomic History    Marital status:    Tobacco Use    Smoking status: Former     Current packs/day: 0.00     Average packs/day: 0.5 packs/day for 45.0 years (22.5 ttl pk-yrs)     Types: Cigarettes     Start date:  1973     Quit date: 2018     Years since quittin.0     Passive exposure: Past    Smokeless tobacco: Never   Vaping Use    Vaping status: Never Used   Substance and Sexual Activity    Alcohol use: Never    Drug use: Never    Sexual activity: Defer     Family History   Problem Relation Age of Onset    Skin cancer Mother         skin carcinoma    Breast cancer Sister     Lupus Sister         lupus erythematosus    Breast cancer Sister     Rectal cancer Sister        Results     Result Review   The following data was reviewed by: Darby Cole MD on 10/03/2024:  Lab Results   Component Value Date    HGB 13.1 2025    HCT 42.7 2025    MCV 97.3 (H) 2025     2025    WBC 8.93 2025    NEUTROABS 6.14 2025    LYMPHSABS 1.88 2025    MONOSABS 0.67 2025    EOSABS 0.13 2025    BASOSABS 0.08 2025     Lab Results   Component Value Date    GLUCOSE 110 (H) 2025    BUN 15 2025    CREATININE 1.00 2025     2025    K 4.4 2025     2025    CO2 25.9 2025    CALCIUM 9.5 2025    PROTEINTOT 6.8 2025    ALBUMIN 4.4 2025    BILITOT 1.1 2025    ALKPHOS 74 2025    AST 15 2025    ALT 13 2025     Lab Results   Component Value Date    MG 2.1 2021    PHOS 3.8 2021    FREET4 1.31 2024    TSH 2.450 2024       Surgical Pathology Report                         Case: [id number]                                   Authorizing Provider:  Tj Hernandez MD  Collected:           2024 02:05 PM           Ordering Location:     [facility]      Received:            2024 06:55 AM                                  MAMMOGRAPHY                                                                   Pathologist:           Michelle Urena DO                                                       Specimens:   1) - Breast, Right, RT BREAST U/S BX/1200, 4CMFN                                                     2) - Breast, Right, RT BREAST U/S BX/1100,4CMFN                                            Clinical Information    Right breast subareolar mass   Final Diagnosis   1. Right breast,  12 o'clock position, 4 cm from nipple, ultrasound-guided core biopsy:               - Fibroadenomatoid change  - Columnar cell change   - Fibrosis        2. 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)  - Histologic grade ([place] Histologic Score):    - Glandular (Acinar)/Tubular Differentiation:  Score 3  - Nuclear Pleomorphism:  Score 1  - Mitotic Rate:  Score 1  - Overall Grade:  Grade 1               - Size of largest focus of invasion:  8 mm               - Breast biomarker testing:                            - Estrogen Receptor (ER):  Positive (95%, strong)                            - Progesterone Receptor (PgR):  Positive (15%, strong)                            - HER2 (by immunohistochemistry):  Equivocal (Score 2+), see comment  - Ki-67:  12%               - Other findings:                            - 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 LUIS FERNANDO Hernandez at 16:11 on 9/25/2024 by Zia KOTHARI.           Comment:  HER2 FISH has been ordered.  See separate reference laboratory report for results.    Electronically signed by Michelle Urena DO on 9/25/2024 at 1641   Synoptic Checklist   Breast Biomarker Reporting Template   Protocol posted: 12/13/2023BREAST BIOMARKER REPORTING TEMPLATE - All Specimens  Test(s) Performed     Estrogen Receptor (ER) Status  Positive (greater than 10% of cells demonstrate nuclear positivity)   Percentage of Cells with Nuclear Positivity  95 %   Average Intensity of Staining   Strong   Test Type  Food and Drug Administration (FDA) cleared (test / vendor): Roche   Primary Antibody  SP1   Test(s) Performed     Progesterone Receptor (PgR) Status  Positive   Percentage of Cells with Nuclear Positivity  15 %   Average Intensity of Staining  Strong   Test Type  Food and Drug Administration (FDA) cleared (test / vendor): Roche   Primary Antibody  1E2   Test(s) Performed     HER2 by Immunohistochemistry  Equivocal (Score 2+)   Percentage of Cells with Uniform Intense Complete Membrane Staining  0 %   Test Type  Food and Drug Administration (FDA) cleared (test / vendor): Roche   Primary Antibody  CB11   Test(s) Performed  Ki-67   Ki-67 Percentage of Positive Nuclei  12 %   Cold Ischemia and Fixation Times  Meet requirements specified in latest version of the ASCO / CAP Guidelines          No radiology results for the last day       Assessment & Plan     Diagnoses and all orders for this visit:    1. Vitamin D deficiency (Primary)  -     vitamin D (ERGOCALCIFEROL) 1.25 MG (39366 UT) capsule capsule; Take 1 capsule by mouth Every 7 (Seven) Days.  Dispense: 8 capsule; Refill: 0  -     Cancel: Vitamin D,25-Hydroxy; Future  -     Vitamin D,25-Hydroxy; Future    2. Invasive carcinoma of breast  -     anastrozole (ARIMIDEX) 1 MG tablet; Take 1 tablet by mouth Daily. This is to get the patient through until her follow up visit  Dispense: 30 tablet; Refill: 2  -     Cancel: CBC & Differential; Future  -     Cancel: Comprehensive Metabolic Panel; Future  -     Comprehensive Metabolic Panel; Future  -     CBC & Differential; Future             Deanne Pittman is a 79 y.o. female who presents to [facility] HEMATOLOGY [facility] ONCOLOGY for Right HR + Breast cancer, Invasive carcinoma no special type (ductal), grade 1, ER +95%, AL +50%, HER2 equivocal by IHC 2+, HER2 FISH was negative, pT2pN0, oncotype DX score of 15    -Reviewed mammogram, breast biopsy results with patient today and discussed  diagnosis with patient.  -Patient is s/p right mastectomy performed by Dr. Schwartz  - Obtained Oncotype DX score report determine she requires adjuvant chemotherapy, pt with low score of 15, thus discussed that patient does not require adjuvant chemotherapy due to lack of benefit.  - Shared with her at a minimum she require 5 years of endocrine therapy with anastrozole, provided handout and discussed common side effects of this medication.  -Prescribed anastrozole on 12/12/2024, pt tolerating it well after 1 month, reviewed most recent labs, provided refills of Anastrozole, recommend pt continue Anastrozole  -from drug interaction check today, there does not appear to be an interaction between Atorvastatin and Anastrozole, informed pt of this today, if she has other questions about her statin recommend she discuss with her PCP an her next appointment, I do not recommend she stop atorvastatin without speaking with her PCP beforehand.      Family history of cancer  - Patient with 2 sisters with breast cancer diagnosed in the 40s, 1 sister also had rectal cancer  - Thus ordered [facility] genetic testing, in October 2024, which revealed a BARD1 mutation, refer patient to have genetic counseling as especially given patient having several questions regarding testing for her daughter    Osteoporosis  -seen on DEXA scan on 7/7/2023  -recommended pt discuss starting Zometa or prolia with nephrologist,pt was not cleared to start either of these medications, her nephrologist recommended calcium and Vit D supplementation alone  -given Vit D deficiency on labs, ordered Vit D supplementation 50K IU weekly for 8 weeks followed by dose of Vit D recommended by her nephrologist along with calcium supplementation  -also recommend dental evaluation for clearance prior to starting these medications as well.     Discussed plan to have patient follow-up in 3 months with labs CBC, CMP, Vit D    Please note that portions of this note were  completed with a voice recognition program.    Electronically signed by Darby Cole MD, 01/21/25, 1:08 PM EST.        Assessment & Plan      Follow Up     I spent 30 minutes caring for Deanne on this date of service. This time includes time spent by me in the following activities:preparing for the visit, reviewing tests, obtaining and/or reviewing a separately obtained history, performing a medically appropriate examination and/or evaluation , counseling and educating the patient/family/caregiver, ordering medications, tests, or procedures, referring and communicating with other health care professionals , documenting information in the medical record, independently interpreting results and communicating that information with the patient/family/caregiver, and care coordination    Any chemotherapy or immunotherapy or other systemic therapy treatment plan involves a high risk of complications and/or mortality of patient management.    The patient was seen and examined. Work by the provider also included review and/or ordering of lab tests, review and/or ordering of radiology tests, review and/or ordering of medicine tests, discussion with other physicians or providers, independent review of data, obtaining old records, review/summation of old records, and/or other review.    I have reviewed the family history, social history, and past medical history for this patient. Previous information and data has been reviewed and updated as needed. I have reviewed and verified the chief complaint, history, and other documentation. The patient was interviewed and examined in the clinic and the chart reviewed. The previous observations, recommendations, and conclusions were reviewed including those of other providers.     The plan was discussed with the patient and/or family. The patient was given time to ask questions and these questions were answered. At the conclusion of their visit they had no additional questions or concerns  and all questions were answered to their satisfaction.    Patient was given instructions and counseling regarding her condition or for health maintenance advice. Please see specific information pulled into the AVS if appropriate.

## 2025-01-24 ENCOUNTER — OFFICE VISIT (OUTPATIENT)
Dept: FAMILY MEDICINE CLINIC | Facility: CLINIC | Age: 80
End: 2025-01-24
Payer: MEDICARE

## 2025-01-24 VITALS
OXYGEN SATURATION: 100 % | WEIGHT: 125 LBS | TEMPERATURE: 97.8 F | DIASTOLIC BLOOD PRESSURE: 70 MMHG | BODY MASS INDEX: 24.54 KG/M2 | HEART RATE: 85 BPM | SYSTOLIC BLOOD PRESSURE: 116 MMHG | HEIGHT: 60 IN

## 2025-01-24 DIAGNOSIS — E11.22 CONTROLLED TYPE 2 DIABETES MELLITUS WITH STAGE 3 CHRONIC KIDNEY DISEASE, WITHOUT LONG-TERM CURRENT USE OF INSULIN: Primary | ICD-10-CM

## 2025-01-24 DIAGNOSIS — I87.2 CHRONIC VENOUS INSUFFICIENCY: ICD-10-CM

## 2025-01-24 DIAGNOSIS — N18.30 CONTROLLED TYPE 2 DIABETES MELLITUS WITH STAGE 3 CHRONIC KIDNEY DISEASE, WITHOUT LONG-TERM CURRENT USE OF INSULIN: Primary | ICD-10-CM

## 2025-01-24 DIAGNOSIS — C50.911 INVASIVE DUCTAL CARCINOMA OF RIGHT BREAST: ICD-10-CM

## 2025-01-24 DIAGNOSIS — Z23 NEED FOR INFLUENZA VACCINATION: ICD-10-CM

## 2025-01-24 DIAGNOSIS — I10 ESSENTIAL (PRIMARY) HYPERTENSION: ICD-10-CM

## 2025-01-24 DIAGNOSIS — E78.2 MIXED HYPERLIPIDEMIA: ICD-10-CM

## 2025-01-24 LAB
ALBUMIN UR-MCNC: <1.2 MG/DL
CHOLEST SERPL-MCNC: 133 MG/DL (ref 0–200)
CREAT UR-MCNC: 18 MG/DL
HBA1C MFR BLD: 5.4 % (ref 4.8–5.6)
HDLC SERPL-MCNC: 73 MG/DL (ref 40–60)
LDLC SERPL CALC-MCNC: 46 MG/DL (ref 0–100)
LDLC/HDLC SERPL: 0.64 {RATIO}
MICROALBUMIN/CREAT UR: NORMAL MG/G{CREAT}
T4 FREE SERPL-MCNC: 1.25 NG/DL (ref 0.92–1.68)
TRIGL SERPL-MCNC: 68 MG/DL (ref 0–150)
TSH SERPL DL<=0.05 MIU/L-ACNC: 1.59 UIU/ML (ref 0.27–4.2)
VLDLC SERPL-MCNC: 14 MG/DL (ref 5–40)

## 2025-01-24 PROCEDURE — 82570 ASSAY OF URINE CREATININE: CPT | Performed by: NURSE PRACTITIONER

## 2025-01-24 PROCEDURE — G0008 ADMIN INFLUENZA VIRUS VAC: HCPCS | Performed by: NURSE PRACTITIONER

## 2025-01-24 PROCEDURE — 99214 OFFICE O/P EST MOD 30 MIN: CPT | Performed by: NURSE PRACTITIONER

## 2025-01-24 PROCEDURE — 82043 UR ALBUMIN QUANTITATIVE: CPT | Performed by: NURSE PRACTITIONER

## 2025-01-24 PROCEDURE — 84439 ASSAY OF FREE THYROXINE: CPT | Performed by: NURSE PRACTITIONER

## 2025-01-24 PROCEDURE — 3078F DIAST BP <80 MM HG: CPT | Performed by: NURSE PRACTITIONER

## 2025-01-24 PROCEDURE — 1126F AMNT PAIN NOTED NONE PRSNT: CPT | Performed by: NURSE PRACTITIONER

## 2025-01-24 PROCEDURE — 80061 LIPID PANEL: CPT | Performed by: NURSE PRACTITIONER

## 2025-01-24 PROCEDURE — 84443 ASSAY THYROID STIM HORMONE: CPT | Performed by: NURSE PRACTITIONER

## 2025-01-24 PROCEDURE — 83036 HEMOGLOBIN GLYCOSYLATED A1C: CPT | Performed by: NURSE PRACTITIONER

## 2025-01-24 PROCEDURE — 90662 IIV NO PRSV INCREASED AG IM: CPT | Performed by: NURSE PRACTITIONER

## 2025-01-24 PROCEDURE — G2211 COMPLEX E/M VISIT ADD ON: HCPCS | Performed by: NURSE PRACTITIONER

## 2025-01-24 PROCEDURE — 36415 COLL VENOUS BLD VENIPUNCTURE: CPT | Performed by: NURSE PRACTITIONER

## 2025-01-24 PROCEDURE — 3074F SYST BP LT 130 MM HG: CPT | Performed by: NURSE PRACTITIONER

## 2025-01-24 NOTE — PROGRESS NOTES
Venipuncture Blood Specimen Collection  Venipuncture performed [place] by Stefani Diego MA with good hemostasis. Patient tolerated the procedure well without complications.   01/24/25   Stefani Diego MA

## 2025-01-24 NOTE — PROGRESS NOTES
Chief Complaint  Diabetes (6 month follow up) and Hyperlipidemia    History of Present Illness  Deanne Pittman is a 79 y.o. female who presents to [facility] with a past medical history of    Past Medical History:   Diagnosis Date    Cancer     BREAST    Cardiomyopathy     PT STATES R/T TO PAST MEDS/NO ISSUES PER PATIENT, FOLLOWS W/PCP-B. VESSELS    CKD (chronic kidney disease) stage 3, GFR 30-59 ml/min     FOLLOWS EMERY GR    Diabetes mellitus     Hyperlipidemia     Renal cyst     right    S/P right mastectomy 11/06/2024       History of Present Illness  The patient is a 79-year-old female who presents to the office today for follow-up on chronic health conditions. Since her last appointment in September, she has been diagnosed with breast cancer. She has seen breast surgery as well as hematology/oncology. She is status post right mastectomy with Dr. Schwartz. Her Oncotype diagnosis score was low at 15, thus she did not require adjuvant chemotherapy due to lack of benefit. She will require a minimum of 5 years of endocrine therapy with anastrozole, which has recently been prescribed by hematology/oncology. She underwent genetic testing with [facility] in October 2024 due to a family history of breast cancer in 2 sisters diagnosed in their 40s and one sister with rectal cancer. 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 Because of her osteoporosis on her DEXA in July 2023, hematology/oncology recommended discussing starting Zometa or Prolia with her nephrologist. However, she was not cleared by nephrology to start either medication. Instead, her nephrologist recommended calcium and vitamin D supplementation alone. She was vitamin D deficient on her most recent labs, so she was started on high-dose vitamin D weekly.    She has been on anastrozole since 12/12/2024 and reports no adverse effects. She had  "labs done on 12/14/2024 and saw her doctor on Tuesday. She is currently taking vitamin D because her levels were low. She is also taking 900 mg of calcium daily as advised by Dr. Davis. She was informed that she should not take osteoporosis medication due to her kidney condition. She is due for a DEXA scan after July 2025. Her vitamin D level was 47 a year ago, and it remained normal 7 months ago and 3 months ago, but it recently dropped to 26.     She was told that her blood sugar was 109, but she thought it was 99. Her blood sugar was 112 when she was in the hospital, but it has been between 80 and 100 since she came home. She has never had a blood sugar of 80 before when she checked it in the mornings, but it seems that it has been this way since she had surgery. She is fasting today.  She is doing okay with the metformin and the cholesterol medicine. She does not need Flonase right now. She had her eye exam in March 2024.    FAMILY HISTORY  The patient has a family history of breast cancer in 2 sisters diagnosed in their 40s and one sister with rectal cancer.    MEDICATIONS  Current: Anastrozole, vitamin D, calcium, metformin, cholesterol medicine      Objective   Vital Signs:   Vitals:    01/24/25 0927   BP: 116/70   BP Location: Left arm   Pulse: 85   Temp: 97.8 °F (36.6 °C)   TempSrc: Temporal   SpO2: 100%   Weight: 56.7 kg (125 lb)   Height: 152.4 cm (60\")     Body mass index is 24.41 kg/m².    Wt Readings from Last 3 Encounters:   01/24/25 56.7 kg (125 lb)   01/21/25 56.4 kg (124 lb 6.4 oz)   12/27/24 54.9 kg (121 lb)     BP Readings from Last 3 Encounters:   01/24/25 116/70   01/21/25 95/63   12/27/24 124/78       Health Maintenance   Topic Date Due    HEMOGLOBIN A1C  12/19/2024    DIABETIC EYE EXAM  03/22/2025    COVID-19 Vaccine (5 - 2024-25 season) 01/26/2025 (Originally 9/1/2024)    INFLUENZA VACCINE  03/31/2025 (Originally 7/1/2024)    RSV Vaccine - Adults (1 - 1-dose 75+ series) 01/24/2026 " (Originally 9/28/2021)    LIPID PANEL  06/19/2025    DXA SCAN  07/07/2025    ANNUAL WELLNESS VISIT  07/25/2025    LUNG CANCER SCREENING  07/26/2025    TDAP/TD VACCINES (2 - Td or Tdap) 01/18/2029    HEPATITIS C SCREENING  Completed    Pneumococcal Vaccine 65+  Completed    ZOSTER VACCINE  Completed    MAMMOGRAM  Discontinued    URINE MICROALBUMIN  Discontinued       Physical Exam  Vitals reviewed.   Constitutional:       General: She is not in acute distress.     Appearance: Normal appearance. She is well-developed and normal weight. She is not ill-appearing.   HENT:      Head: Normocephalic and atraumatic.   Eyes:      General: No scleral icterus.        Right eye: No discharge.         Left eye: No discharge.      Extraocular Movements: Extraocular movements intact.      Conjunctiva/sclera: Conjunctivae normal.   Neck:      Thyroid: No thyromegaly.      Vascular: No carotid bruit.      Trachea: Trachea normal.   Cardiovascular:      Rate and Rhythm: Normal rate and regular rhythm.      Pulses: Normal pulses.      Heart sounds: No murmur heard.  Pulmonary:      Effort: Pulmonary effort is normal.      Breath sounds: Normal breath sounds. No wheezing, rhonchi or rales.   Musculoskeletal:         General: Normal range of motion.      Cervical back: Normal range of motion and neck supple. No tenderness.      Right lower leg: No edema.      Left lower leg: No edema.   Lymphadenopathy:      Cervical: No cervical adenopathy.   Skin:     General: Skin is warm and dry.   Neurological:      Mental Status: She is alert and oriented to person, place, and time.   Psychiatric:         Mood and Affect: Mood and affect normal.         Behavior: Behavior normal.         Thought Content: Thought content normal.         Judgment: Judgment normal.            Result Review :  The following data was reviewed by: BETSY Rocha on 01/24/2025:    Lab on 01/13/2025   Component Date Value    25 Hydroxy, Vitamin D 01/13/2025 26.7  (L)     Glucose 01/13/2025 110 (H)     BUN 01/13/2025 15     Creatinine 01/13/2025 1.00     Sodium 01/13/2025 140     Potassium 01/13/2025 4.4     Chloride 01/13/2025 103     CO2 01/13/2025 25.9     Calcium 01/13/2025 9.5     Total Protein 01/13/2025 6.8     Albumin 01/13/2025 4.4     ALT (SGPT) 01/13/2025 13     AST (SGOT) 01/13/2025 15     Alkaline Phosphatase 01/13/2025 74     Total Bilirubin 01/13/2025 1.1     Globulin 01/13/2025 2.4     A/G Ratio 01/13/2025 1.8     BUN/Creatinine Ratio 01/13/2025 15.0     Anion Gap 01/13/2025 11.1     eGFR 01/13/2025 57.8 (L)     WBC 01/13/2025 8.93     RBC 01/13/2025 4.39     Hemoglobin 01/13/2025 13.1     Hematocrit 01/13/2025 42.7     MCV 01/13/2025 97.3 (H)     MCH 01/13/2025 29.8     MCHC 01/13/2025 30.7 (L)     RDW 01/13/2025 13.7     RDW-SD 01/13/2025 48.9     MPV 01/13/2025 11.8     Platelets 01/13/2025 331     Neutrophil % 01/13/2025 68.7     Lymphocyte % 01/13/2025 21.1     Monocyte % 01/13/2025 7.5     Eosinophil % 01/13/2025 1.5     Basophil % 01/13/2025 0.9     Immature Grans % 01/13/2025 0.3     Neutrophils, Absolute 01/13/2025 6.14     Lymphocytes, Absolute 01/13/2025 1.88     Monocytes, Absolute 01/13/2025 0.67     Eosinophils, Absolute 01/13/2025 0.13     Basophils, Absolute 01/13/2025 0.08     Immature Grans, Absolute 01/13/2025 0.03     nRBC 01/13/2025 0.0    Office Visit on 12/27/2024   Component Date Value    SARS Antigen 12/27/2024 Detected (A)     Influenza A Antigen DELFINO 12/27/2024 Not Detected     Influenza B Antigen DELFINO 12/27/2024 Not Detected     Internal Control 12/27/2024 Passed     Lot Number 12/27/2024 [id number]     Expiration Date 12/27/2024 11/18/25      Common labs          6/19/2024    12:11 6/19/2024    14:39 10/3/2024    15:25 1/13/2025    10:23   Common Labs   Glucose 83   101  110    BUN 24   21  15    Creatinine 1.04   1.00  1.00    Sodium 137   141  140    Potassium 4.4   4.7  4.4    Chloride 101   106  103    Calcium 9.3   10.0  9.5     Albumin 4.5   4.4  4.4    Total Bilirubin 0.7   0.9  1.1    Alkaline Phosphatase 84   92  74    AST (SGOT) 16   16  15    ALT (SGPT) 16   14  13    WBC 7.89   8.30  8.93    Hemoglobin 13.3   13.4  13.1    Hematocrit 39.9   41.0  42.7    Platelets 300   328  331    Total Cholesterol 138       Triglycerides 84       HDL Cholesterol 72       LDL Cholesterol  50       Hemoglobin A1C 6.10       Microalbumin, Urine <1.2  <1.2        NM Bond Node Injection Only    Result Date: 11/5/2024  Technically successful intradermal radiopharmaceutical injection around the right areola for the purposes of intraoperative sentinal node detection.     Electronically Signed By-Clint Ramirez DO On:11/5/2024 12:48 PM      Mammo Diagnostic Digital Tomosynthesis Right With CAD (09/03/2024 09:30)  US Breast Right Limited (09/03/2024 09:48)  US Guided Breast Biopsy With & Without Device initial (09/23/2024 14:05)  US Guided Breast Biopsy With & Without Device Each Additional (09/23/2024 13:58)  Mammo Post Device Placement Right (09/23/2024 13:59)      Procedures        Assessment and Plan   Diagnoses and all orders for this visit:    1. Controlled type 2 diabetes mellitus with stage 3 chronic kidney disease, without long-term current use of insulin (Primary)  -     Hemoglobin A1c  -     Lipid Panel  -     TSH+Free T4  -     Microalbumin / Creatinine Urine Ratio - Urine, Clean Catch    2. Essential (primary) hypertension  -     Lipid Panel  -     TSH+Free T4  -     Microalbumin / Creatinine Urine Ratio - Urine, Clean Catch    3. Mixed hyperlipidemia  -     Lipid Panel  -     TSH+Free T4    4. Chronic venous insufficiency    5. Invasive ductal carcinoma of right breast    6. Need for influenza vaccination  -     Fluzone High-Dose 65+yrs        Assessment & Plan  1. Breast cancer.  She is status post right mastectomy and has been prescribed anastrozole by hematology/oncology. She will require a minimum of 5 years of endocrine  therapy.    2. Osteoporosis.  Due to her osteoporosis diagnosis from the DEXA scan in July 2023, hematology/oncology recommended discussing Zometa or Prolia with her nephrologist. However, she was not cleared for these medications due to kidney concerns. She is currently on calcium and high-dose vitamin D supplementation.    3. Vitamin D deficiency.  Her vitamin D levels were found to be low, and she has been started on high-dose vitamin D weekly. Her vitamin D level was 47 a year ago, and it remained normal 7 months ago and 3 months ago, but it recently dropped to 26.    4. Type II Diabetes.  Her A1c in June 2024 was 6.1, indicating she is in the prediabetic range. She is currently managing her blood sugar levels with metformin.    5. Hyperlipidemia.  She is currently on cholesterol medication and reports doing well.    6. BRCA1 mutation.  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    7. Health maintenance.  Her last eye exam was in March 2024, and she is due for another in March 2025.    PROCEDURE  The patient underwent a right mastectomy.      BMI is within normal parameters. No other follow-up for BMI required.         FOLLOW UP  Return in about 6 months (around 7/28/2025) for Subseqent Medicare Wellness, medication refills and fasting labs.    Patient was given instructions and counseling regarding her condition or for health maintenance advice. Please see specific information pulled into the AVS if appropriate.       Huyen Damon, APRN  01/24/25  10:08 EST    CURRENT & DISCONTINUED MEDICATIONS  Current Outpatient Medications   Medication Instructions    anastrozole (ARIMIDEX) 1 mg, Oral, Daily, This is to get the patient through until her follow up visit    anastrozole (ARIMIDEX) 1 mg, Oral, Daily    atorvastatin (LIPITOR) 20 mg, Oral, Every Night at Bedtime    carvedilol (COREG) 6.25 MG tablet 1 tablet, 2 times daily    cyanocobalamin (VITAMIN B-12)  1,000 mcg    fluticasone (FLONASE) 50 MCG/ACT nasal spray 2 sprays, Nasal, Daily    losartan (COZAAR) 25 MG tablet 1 tablet, Daily    metFORMIN (GLUCOPHAGE) 500 mg, Oral, 2 Times Daily With Meals    Omega-3 Fatty Acids (OMEGA 3 500 PO) 500 mg, 2 Times Daily    vitamin D (ERGOCALCIFEROL) 50,000 Units, Oral, Every 7 Days       Medications Discontinued During This Encounter   Medication Reason    Nirmatrelvir & Ritonavir, 300mg/100mg, (PAXLOVID) *Therapy completed    ipratropium (ATROVENT) 0.06 % nasal spray *Therapy completed    methylPREDNISolone (MEDROL) 4 MG dose pack *Therapy completed        Patient or patient representative verbalized consent for the use of Ambient Listening during the visit with  BETSY Rocha for chart documentation. 1/24/2025  10:09 EST

## 2025-02-11 ENCOUNTER — TELEPHONE (OUTPATIENT)
Facility: HOSPITAL | Age: 80
End: 2025-02-11
Payer: MEDICARE

## 2025-02-11 DIAGNOSIS — C50.911 MALIGNANT NEOPLASM OF RIGHT BREAST IN FEMALE, ESTROGEN RECEPTOR POSITIVE, UNSPECIFIED SITE OF BREAST: ICD-10-CM

## 2025-02-11 DIAGNOSIS — Z90.11 HISTORY OF MASTECTOMY, RIGHT: Primary | ICD-10-CM

## 2025-02-11 DIAGNOSIS — Z90.10 DEFORMITY DUE TO MASTECTOMY: ICD-10-CM

## 2025-02-11 DIAGNOSIS — Z17.0 MALIGNANT NEOPLASM OF RIGHT BREAST IN FEMALE, ESTROGEN RECEPTOR POSITIVE, UNSPECIFIED SITE OF BREAST: ICD-10-CM

## 2025-02-11 DIAGNOSIS — N64.89 DEFORMITY DUE TO MASTECTOMY: ICD-10-CM

## 2025-02-11 DIAGNOSIS — Z44.9 FITTING AND ADJUSTMENT OF UNSPECIFIED PROSTHETIC DEVICE: ICD-10-CM

## 2025-02-18 ENCOUNTER — HOSPITAL ENCOUNTER (OUTPATIENT)
Facility: HOSPITAL | Age: 80
Setting detail: THERAPIES SERIES
Discharge: HOME | End: 2025-02-18
Payer: MEDICARE

## 2025-02-18 DIAGNOSIS — Z17.0 MALIGNANT NEOPLASM OF UPPER-OUTER QUADRANT OF RIGHT BREAST IN FEMALE, ESTROGEN RECEPTOR POSITIVE: ICD-10-CM

## 2025-02-18 DIAGNOSIS — Z90.10 DEFORMITY DUE TO MASTECTOMY: ICD-10-CM

## 2025-02-18 DIAGNOSIS — C50.411 MALIGNANT NEOPLASM OF UPPER-OUTER QUADRANT OF RIGHT BREAST IN FEMALE, ESTROGEN RECEPTOR POSITIVE: ICD-10-CM

## 2025-02-18 DIAGNOSIS — Z90.11 HISTORY OF MASTECTOMY, RIGHT: Primary | ICD-10-CM

## 2025-02-18 DIAGNOSIS — N64.89 DEFORMITY DUE TO MASTECTOMY: ICD-10-CM

## 2025-02-18 DIAGNOSIS — Z44.9 FITTING AND ADJUSTMENT OF UNSPECIFIED PROSTHETIC DEVICE: ICD-10-CM

## 2025-02-18 DIAGNOSIS — Z91.89 AT RISK FOR LYMPHEDEMA: ICD-10-CM

## 2025-02-18 PROCEDURE — 93702 BIS XTRACELL FLUID ANALYSIS: CPT | Performed by: OCCUPATIONAL THERAPIST

## 2025-02-18 PROCEDURE — 97165 OT EVAL LOW COMPLEX 30 MIN: CPT | Performed by: OCCUPATIONAL THERAPIST

## 2025-02-18 PROCEDURE — L8030 BREAST PROSTHES W/O ADHESIVE: HCPCS | Performed by: OCCUPATIONAL THERAPIST

## 2025-02-18 PROCEDURE — L8000 MASTECTOMY BRA: HCPCS | Performed by: OCCUPATIONAL THERAPIST

## 2025-02-18 NOTE — THERAPY EVALUATION
Outpatient Occupational Therapy Lymphedema Initial Evaluation   Larisa     Patient Name: Deanne Pittman  : 1945  MRN: [id number]  Today's Date: 2025      Visit Date: 2025    Patient Active Problem List   Diagnosis    Cardiomyopathy    Chronic venous insufficiency    Hyperlipidemia    Essential (primary) hypertension    Stage III chronic kidney disease    Renal cyst    Malignant neoplasm of right breast in female, estrogen receptor positive    Breast cancer    S/P right mastectomy    Type 2 diabetes mellitus without complication, without long-term current use of insulin        Past Medical History:   Diagnosis Date    Cancer     BREAST    Cardiomyopathy     PT STATES R/T TO PAST MEDS/NO ISSUES PER PATIENT, FOLLOWS W/PCP-B. VESSELS    CKD (chronic kidney disease) stage 3, GFR 30-59 ml/min     FOLLOWS W/ TICO GR    Diabetes mellitus     Hyperlipidemia     Renal cyst     right    S/P right mastectomy 2024        Past Surgical History:   Procedure Laterality Date    BREAST SURGERY Left     lump removed    COLON SURGERY      COLOSTOMY AND REVERSAL    LIVER SURGERY      exploritory surgery due to puncture to liver     MASTECTOMY WITH SENTINEL NODE BIOPSY AND AXILLARY NODE DISSECTION Right 2024    Procedure: RIGHT BREAST MASTECTOMY WITH RIGHT SENTINEL NODE BIOPSY AND AXILLARY NODE DISSECTION;  Surgeon: Cristel Schwartz MD;  Location: [facility];  Service: General;  Laterality: Right;    SUBTOTAL HYSTERECTOMY           Visit Dx:     ICD-10-CM ICD-9-CM   1. History of mastectomy, right  Z90.11 V45.71   2. Deformity due to mastectomy  N64.89 611.89    Z90.10    3. Fitting and adjustment of unspecified prosthetic device  Z44.9 V52.9   4. At risk for lymphedema  Z91.89 V49.89   5. Malignant neoplasm of upper-outer quadrant of right breast in female, estrogen receptor positive  C50.411 174.4    Z17.0 V86.0        Patient History       Row Name 25 0900             History     Chief Complaint --  Lymphedema surveillance program  -TD      Brief Description of Current Complaint Deanne is a 79-year-old female with a recent diagnosis of invasive ductal carcinoma grade 1 ER/DE positive of the right breast.  Patient underwent right-sided mastectomy on November 5, 2024 and had 2 lymphnodes at that time.  -TD         Fall Risk Assessment    Any falls in the past year: No  -TD      Does patient have a fear of falling No  -TD         Services    Are you currently receiving Home Health services No  -TD      Do you plan to receive Home Health services in the near future No  -TD         Daily Activities    Primary Language English  -TD      Are you able to read Yes  -TD      Are you able to write Yes  -TD      How does patient learn best? Listening;Reading;Demonstration;Pictures/Video  -TD         Safety    Are you being hurt, hit, or frightened by anyone at home or in your life? No  -TD      Are you being neglected by a caregiver No  -TD      Have you had any of the following issues with N/A  -TD                User Key  (r) = Recorded By, (t) = Taken By, (c) = Cosigned By      Initials Name Provider Type    Lauren Adan OT Occupational Therapist                     Lymphedema       Row Name 02/18/25 0900             Subjective Pain    Able to rate subjective pain? yes  -TD      Pre-Treatment Pain Level 0  -TD      Post-Treatment Pain Level 0  -TD         Lymphedema Assessment    Lymphedema Classification RUE:;at risk/stage 0  -TD      Lymphedema Cancer Related Sx right;simple mastectomy;sentinel node biopsy  -TD      Lymphedema Surgery Comments 11/2024  -TD      Lymph Nodes Removed # 2  -TD      Positive Lymph Nodes # 0  -TD      Chemo Received no  -TD      Radiation Therapy Received no  -TD         LLIS - Physical Concerns    The amount of pain associated with my lymphedema is: 0  -TD      The amount of limb heaviness associated with my lymphedema is: 0  -TD      The amount of skin tightness  "associated with my lymphedema is: 0  -TD      The size of my swollen limb(s) seems: 0  -TD      Lymphedema affects the movement of my swollen limb(s): 0  -TD      The strength in my swollen limb(s) is: 0  -TD         LLIS - Psychosocial Concerns    Lymphedema affects my body image (i.e., \"how I think I look\"). 0  -TD      Lymphedema affects my socializing with others. 0  -TD      Lymphedema affects my intimate relations with spouse or partner (rate 0 if not applicable 0  -TD      Lymphedema \"gets me down\" (i.e., depression, frustration, or anger) 0  -TD      I must rely on others for help due to my lymphedema. 0  -TD      I know what to do to manage my lymphedema 3  -TD         LLIS - Functional Concerns    Lymphedema affects my ability to perform self-care activities (i.e. eating, dressing, hygiene) 0  -TD      Lymphedema affects my ability to perform routine home or work-related activities. 0  -TD      Lymphedema affects my performance of preferred leisure activities. 0  -TD      Lymphedema affects proper fit of clothing/shoes 0  -TD      Lymphedema affects my sleep 0  -TD         Posture/Observations    Posture- WNL Posture is WNL  -TD         General ROM    GENERAL ROM COMMENTS BUE are WFL  -TD         MMT (Manual Muscle Testing)    General MMT Comments BUE are WFL  -TD         Skin Changes/Observations    Location/Assessment Upper Quadrant  -TD      Upper Quadrant Conditions right:;normal;intact;clean  -TD      Upper Quadrant Color/Pigment left:;normal  -TD         L-Dex Bioimpedence Screening    L-Dex Measurement Extremity RUE  -TD      L-Dex Patient Position Standing  -TD      L-Dex UE Dominate Side Right  -TD      L-Dex UE At Risk Side Right  -TD      L-Dex UE Pre Surgical Value Yes  -TD      L-Dex UE Score -5.1  -TD      L-Dex UE Baseline Score 5.6  -TD      L-Dex UE Value Change -10.7  -TD      $ L-Dex Charge yes  -TD         Lymphedema Life Impact Scale Totals    A.  Total Q1 - Q17 (Do not include Q18) 3  " -TD      B.  Total number of questions answered (Q1-Q17) 17  -TD      C. Divide A by B 0.18  -TD      D. Multiple C by 25 4.5  -TD                User Key  (r) = Recorded By, (t) = Taken By, (c) = Cosigned By      Initials Name Provider Type    TD Lauren Peacock OT Occupational Therapist                     OT Ortho       Row Name 02/18/25 0900             Orthotics & Prosthetics Management    Orthosis Location other (see comments)  Breast prosthesis and post mastectomy orthosis  -TD      Additional Documentation Orthosis Location (Row)  -TD                User Key  (r) = Recorded By, (t) = Taken By, (c) = Cosigned By      Initials Name Provider Type    TD Lauren Peacock OT Occupational Therapist                  OT Mastectomy Care:   Location: Right chest wall    Type of Prosthetic:  [Partial] silicone breast form    Reason for Visit/Customer Goal:  Patient would like to achieve symmetry and balance in appearance to improve orthopedic balance as well as improve psychological impact when engaging in community and social activities.    Reason for Prosthetic: Prevent Deformities    Date of Surgery: 11/2024    Date of Discharge: 11/2024    Wearing Schedule: As Tolerated    Prosthetic Site Condition: Intact    Tolerance: Tolerates Well    Product :  Amol    Product Name and Model Number:   (Issued: 2/18/2025)  400 Xtra light 2sn size: 5 x 1    Garments  Type of Garment Dispensed: Mast Bra w/o Breast Form    Breast : Amol    Product Name:   1-Aleja 34C pink  1-Sveta 34C nude  2-Sveta 34C black     Number of Garments Dispensed: 4     The patient had a follow up  SOZO measurement which I reviewed today. The score is   WFL, see scanned to EMR. Bioimpedance spectroscopy helps identify the   onset of lymphedema in an arm or leg before patients experience noticeable swelling. Research has   shown that 92% of patients with early detection of lymphedema using L-Dex combined with   intervention  do not progress to chronic lymphedema through three years. Additionally, as of March 2023, the NCCN Guidelines® for Survivorship recommend proactive screening for lymphedema using   bioimpedance spectroscopy. Whenever possible, patients are tested for baseline L-Dex score before   cancer treatment begins and then are reassessed during regular follow-up visits using the SOZO device.   Otherwise, this can be started postoperatively and continued during regular follow-up visits. If the   patient’s L-Dex score increases above normal levels, that is a sign that lymphedema is developing and a   referral is made to physical therapy for further evaluation and early compression treatment.   Lymphedema assessment with the SOZO L-Dex score is recommended to be done every 3 months for   the first 3 years and then every 6 months for years 4 and 5 followed by annually afterwards      Therapy Education  Education Details: Pt. was fitted with a 400 xtra light 2sn size: 5 x 1 silicone breast prosthesis. See below.  Fit and size are appropriate with no gapping, pinching, or puckering observed.  Pt. states comfort and satisfaction with both bra's selected and with prosthesis.  All devices were checked for quality and safety.  Pt. was educated on the benefits, precautions warranty, care and maintenance for her prosthesis and bras.  Educational handout was provided in the prosthesis box.  Given: HEP, Symptoms/condition management, Edema management, Pain management  Program: New  How Provided: Verbal, Demonstration  Level of Understanding: Teach back education performed, Verbalized, Demonstrated         OT Goals       Row Name 02/18/25 1220          Time Calculation    OT Goal Re-Cert Due Date 03/20/25  -TD               User Key  (r) = Recorded By, (t) = Taken By, (c) = Cosigned By      Initials Name Provider Type    Lauren Adan OT Occupational Therapist                  1. Post Breast Surgery Care/at risk for Lymphedema  LTG 1:  90 days:  As an indicator of no exacerbation of lymphedema staging, the patient will present with an L-Dex score less than [10] points from preoperative baseline.              STATUS: on going  STG 1a:   30 days: To prevent exacerbation of mixed edema to lymphedema, patient will utilize the 2 postsurgical compression garments daily.                 STATUS: on going  STG 1b: 30 days: Patient will be independent with self-manual lymphatic massage.               STATUS: on going  STG 1c: 30 days:  Patient will be independent with identification of signs and symptoms of lymphedema exasperation per stoplight to recovery education handout.              STATUS: on going  STG 1 d: 30 days: Patient will be independent with HEP to prevent advancement in lymphedema staging.              STATUS: on going  TREATMENT:  Self Care/ADL retraining, Therapeutic Activity, Neuromuscular Re-education, Therapeutic Exercise, Bioimpedence Fluid Analysis, Post-Surgical compression garement 28986 Bethany Zip-ST-High/ Rosanna Camisole Kit 2860K, Orthotic Management and training,  and Manual Therapy.    1. Encounter for Breast Prosthetic and mastectomy bra fitting:  LTG 1:  90 days: To provide balance and symmetry for performance of daily activity, the patient will participate in breast prosthesis and mastectomy bra fitting procedure.   STATUS: New  STG 1a: 30 days:  Patient will participate in mastectomy bra fit re-evaluation to ensure proper fit for balance and symmetry for improved postural alignment/lymph fluid dynamics to prevent impairment in activities of daily living.   STATUS: New  STG 1b: 30 days:  Patient will participate in breast prosthesis fit re-evaluation 2 years after initial distribution to ensure proper fit for balance and symmetry for improved postural alignment/lymph fluid dynamics to prevent impairment in activities of daily living.  STATUS: New  TREATMENT: Orthotic/Prosthetic Management and Training, Amoena Silicone Breast  Prosthetic, Mastectomy Bra initial fitting and 3-4 month re-fitting, ADL/Self Care, Therapeutic Activity, Therapeutic Exercise, and Manual Therapy.      OT Assessment/Plan       Row Name 02/18/25 0941          OT Assessment    Functional Limitations Limitations in functional capacity and performance  -TD     Impairments Impaired lymphatic circulation  -TD     Assessment Comments Deanne is a 79-year-old female with a recent diagnosis of invasive ductal carcinoma grade 1 ER/GA positive of the right breast. Pt will be brought in in two weeks to re evaluate. Patient underwent right-sided mastectomy on November 5, 2024 and had 2 lymphnodes at that time.  Pt presents with decreased balance and symmetry from breast resection that impacts orthopedic balance and symmetry.  Patient will benefit from continued evaluation and of integrity of the silicone breast form as well as the mastectomy bras to ensure proper fit for symmetry and balance of breast prosthesis to reduce orthopedic and lymphatic impairment. The skills of a therapist will be required to safely and effectively implement the following treatment plan to restore maximal level of function.  Patient would benefit from continued skilled occupational therapy to prevent increased staging of lymphedema, increased pain, and decreased range of motion.  -TD     OT Diagnosis at risk for lymphedema  -TD     OT Rehab Potential Good  -TD     Patient/caregiver participated in establishment of treatment plan and goals Yes  -TD     Patient would benefit from skilled therapy intervention Yes  -TD        OT Plan    Predicted Duration of Therapy Intervention (OT) Patient will be seen every 3 months years 1-3, and every 6 months years 4 and 5.  Pt will be seen every 2 years for prosthetic refill and every 3-4 months for bra refill.  -TD     Planned CPT's? OT EVAL LOW COMPLEXITY: 35613;OT RE-EVAL: 41344;OT THER ACT EA 15 MIN: 60075KD;OT THER PROC EA 15 MIN: 71579KE;OT SELF CARE/MGMT/TRAIN  15 MIN: 40108;OT MANUAL THERAPY EA 15 MIN: 71831;OT BIS XTRACELL FLUID ANALYSIS: 69551;OT CARE PLAN EA 15 MIN;OT ORTHOTIC MGMT/TRAIN EA 15 MIN: 07734;OT ORTHO/PROSTHET CHECKOUT EA 15 MIN: 08887  -TD     Planned Therapy Interventions (Optional Details) home exercise program;postural re-education;prosthetic fitting/training;patient/family education;orthotic fitting/training;ROM (Range of Motion);strengthening;stretching;manual therapy techniques  -TD     OT Plan Comments continue POC  -TD               User Key  (r) = Recorded By, (t) = Taken By, (c) = Cosigned By      Initials Name Provider Type    TD Lauren Peacock OT Occupational Therapist                              Time Calculation:   Untimed Charges  OT Eval/Re-eval Minutes: 90  Total Minutes  Untimed Charges Total Minutes: 90   Total Minutes: 90     Therapy Charges for Today       Code Description Service Date Service Provider Modifiers Qty    19172997559 HC PT BIS XTRACELL FLUID ANALYSIS 2/18/2025 Lauren Peacock OT  1    42133398032 HC-OT EVAL LOW COMPLEXITY 5 2/18/2025 Lauren Peacock OT  1    [id number] HC BREAST PROSTHESIS WO ADHS NATURA AMOENA 2/18/2025 Lauren Peacock OT  1    [id number]  BRA POST/SURG NO/FORM SHERWIN/DWAYNE/LISA/DEE/JAY JAY 2/18/2025 Lauren Peacock OT  4                      Lauren Peacock OT  2/18/2025

## 2025-02-26 DIAGNOSIS — E78.2 MIXED HYPERLIPIDEMIA: ICD-10-CM

## 2025-02-26 RX ORDER — ATORVASTATIN CALCIUM 20 MG/1
20 TABLET, FILM COATED ORAL
Qty: 90 TABLET | Refills: 3 | Status: SHIPPED | OUTPATIENT
Start: 2025-02-26

## 2025-03-06 DIAGNOSIS — E55.9 VITAMIN D DEFICIENCY: ICD-10-CM

## 2025-03-06 RX ORDER — ERGOCALCIFEROL 1.25 MG/1
50000 CAPSULE, LIQUID FILLED ORAL
Qty: 8 CAPSULE | Refills: 0 | Status: SHIPPED | OUTPATIENT
Start: 2025-03-06

## 2025-03-06 NOTE — TELEPHONE ENCOUNTER
The pt called and states that she needs a refill for her Vit D. The pt requested that the script be sent to [facility]. Please review and sign scripts.     The pt would like a call back when filled.

## 2025-03-13 ENCOUNTER — CLINICAL SUPPORT (OUTPATIENT)
Dept: FAMILY MEDICINE CLINIC | Facility: CLINIC | Age: 80
End: 2025-03-13
Payer: MEDICARE

## 2025-03-13 DIAGNOSIS — J30.9 ALLERGIC RHINITIS, UNSPECIFIED SEASONALITY, UNSPECIFIED TRIGGER: Primary | ICD-10-CM

## 2025-03-13 PROCEDURE — 95117 IMMUNOTHERAPY INJECTIONS: CPT | Performed by: NURSE PRACTITIONER

## 2025-03-17 ENCOUNTER — CLINICAL SUPPORT (OUTPATIENT)
Dept: FAMILY MEDICINE CLINIC | Facility: CLINIC | Age: 80
End: 2025-03-17
Payer: MEDICARE

## 2025-03-17 DIAGNOSIS — J30.9 ALLERGIC RHINITIS, UNSPECIFIED SEASONALITY, UNSPECIFIED TRIGGER: Primary | ICD-10-CM

## 2025-03-17 PROCEDURE — 95117 IMMUNOTHERAPY INJECTIONS: CPT | Performed by: NURSE PRACTITIONER

## 2025-03-19 ENCOUNTER — CLINICAL SUPPORT (OUTPATIENT)
Dept: FAMILY MEDICINE CLINIC | Facility: CLINIC | Age: 80
End: 2025-03-19
Payer: MEDICARE

## 2025-03-19 DIAGNOSIS — J30.9 ALLERGIC RHINITIS, UNSPECIFIED SEASONALITY, UNSPECIFIED TRIGGER: Primary | ICD-10-CM

## 2025-03-24 ENCOUNTER — CLINICAL SUPPORT (OUTPATIENT)
Dept: FAMILY MEDICINE CLINIC | Facility: CLINIC | Age: 80
End: 2025-03-24
Payer: MEDICARE

## 2025-03-24 DIAGNOSIS — J30.9 ALLERGIC RHINITIS, UNSPECIFIED SEASONALITY, UNSPECIFIED TRIGGER: Primary | ICD-10-CM

## 2025-03-24 PROCEDURE — 95117 IMMUNOTHERAPY INJECTIONS: CPT | Performed by: NURSE PRACTITIONER

## 2025-03-26 ENCOUNTER — CLINICAL SUPPORT (OUTPATIENT)
Dept: FAMILY MEDICINE CLINIC | Facility: CLINIC | Age: 80
End: 2025-03-26
Payer: MEDICARE

## 2025-03-26 DIAGNOSIS — J30.9 ALLERGIC RHINITIS, UNSPECIFIED SEASONALITY, UNSPECIFIED TRIGGER: Primary | ICD-10-CM

## 2025-03-26 PROCEDURE — 95117 IMMUNOTHERAPY INJECTIONS: CPT | Performed by: NURSE PRACTITIONER

## 2025-03-27 LAB
CYTO UR: NORMAL
LAB AP CASE REPORT: NORMAL
LAB AP CLINICAL INFORMATION: NORMAL
LAB AP SPECIAL STAINS: NORMAL
LAB AP SYNOPTIC CHECKLIST: NORMAL
Lab: NORMAL
PATH REPORT.ADDENDUM SPEC: NORMAL
PATH REPORT.FINAL DX SPEC: NORMAL
PATH REPORT.GROSS SPEC: NORMAL

## 2025-03-31 ENCOUNTER — OFFICE VISIT (OUTPATIENT)
Dept: FAMILY MEDICINE CLINIC | Facility: CLINIC | Age: 80
End: 2025-03-31
Payer: MEDICARE

## 2025-03-31 VITALS
BODY MASS INDEX: 24.54 KG/M2 | HEIGHT: 60 IN | DIASTOLIC BLOOD PRESSURE: 74 MMHG | WEIGHT: 125 LBS | HEART RATE: 99 BPM | SYSTOLIC BLOOD PRESSURE: 116 MMHG | OXYGEN SATURATION: 100 % | TEMPERATURE: 97.3 F

## 2025-03-31 DIAGNOSIS — D84.89 OTHER IMMUNODEFICIENCIES: ICD-10-CM

## 2025-03-31 DIAGNOSIS — R51.9 ACUTE NONINTRACTABLE HEADACHE, UNSPECIFIED HEADACHE TYPE: Primary | ICD-10-CM

## 2025-03-31 DIAGNOSIS — J30.9 ALLERGIC RHINITIS, UNSPECIFIED SEASONALITY, UNSPECIFIED TRIGGER: ICD-10-CM

## 2025-03-31 DIAGNOSIS — R09.81 NASAL CONGESTION: ICD-10-CM

## 2025-03-31 DIAGNOSIS — R68.89 FLU-LIKE SYMPTOMS: ICD-10-CM

## 2025-03-31 LAB
B PARAPERT DNA SPEC QL NAA+PROBE: NOT DETECTED
B PERT DNA SPEC QL NAA+PROBE: NOT DETECTED
C PNEUM DNA NPH QL NAA+NON-PROBE: NOT DETECTED
EXPIRATION DATE: NORMAL
FLUAV AG UPPER RESP QL IA.RAPID: NOT DETECTED
FLUAV SUBTYP SPEC NAA+PROBE: NOT DETECTED
FLUBV AG UPPER RESP QL IA.RAPID: NOT DETECTED
FLUBV RNA ISLT QL NAA+PROBE: NOT DETECTED
HADV DNA SPEC NAA+PROBE: NOT DETECTED
HCOV 229E RNA SPEC QL NAA+PROBE: NOT DETECTED
HCOV HKU1 RNA SPEC QL NAA+PROBE: NOT DETECTED
HCOV NL63 RNA SPEC QL NAA+PROBE: NOT DETECTED
HCOV OC43 RNA SPEC QL NAA+PROBE: NOT DETECTED
HMPV RNA NPH QL NAA+NON-PROBE: NOT DETECTED
HPIV1 RNA ISLT QL NAA+PROBE: NOT DETECTED
HPIV2 RNA SPEC QL NAA+PROBE: NOT DETECTED
HPIV3 RNA NPH QL NAA+PROBE: NOT DETECTED
HPIV4 P GENE NPH QL NAA+PROBE: NOT DETECTED
INTERNAL CONTROL: NORMAL
Lab: NORMAL
M PNEUMO IGG SER IA-ACNC: NOT DETECTED
RHINOVIRUS RNA SPEC NAA+PROBE: NOT DETECTED
RSV RNA NPH QL NAA+NON-PROBE: NOT DETECTED
SARS-COV-2 AG UPPER RESP QL IA.RAPID: NOT DETECTED
SARS-COV-2 RNA RESP QL NAA+PROBE: NOT DETECTED

## 2025-03-31 PROCEDURE — 3074F SYST BP LT 130 MM HG: CPT | Performed by: NURSE PRACTITIONER

## 2025-03-31 PROCEDURE — 0202U NFCT DS 22 TRGT SARS-COV-2: CPT | Performed by: NURSE PRACTITIONER

## 2025-03-31 PROCEDURE — 1126F AMNT PAIN NOTED NONE PRSNT: CPT | Performed by: NURSE PRACTITIONER

## 2025-03-31 PROCEDURE — 3078F DIAST BP <80 MM HG: CPT | Performed by: NURSE PRACTITIONER

## 2025-03-31 PROCEDURE — 87428 SARSCOV & INF VIR A&B AG IA: CPT | Performed by: NURSE PRACTITIONER

## 2025-03-31 RX ORDER — EPINEPHRINE 0.3 MG/.3ML
INJECTION SUBCUTANEOUS
COMMUNITY
Start: 2025-03-04

## 2025-03-31 RX ORDER — IPRATROPIUM BROMIDE 42 UG/1
SPRAY, METERED NASAL
COMMUNITY
Start: 2025-03-04

## 2025-03-31 NOTE — PROGRESS NOTES
"Chief Complaint  Headache (Started this morning) and Nasal Congestion (And chest tightness)    Headache    Deanne Pittman is a 79 y.o. female who presents to [facility] with a past medical history of    Past Medical History:   Diagnosis Date    Cancer     BREAST    Cardiomyopathy     PT STATES R/T TO PAST MEDS/NO ISSUES PER PATIENT, FOLLOWS W/PCP-B. VESSELS    CKD (chronic kidney disease) stage 3, GFR 30-59 ml/min     FOLLOWS EMERY GR    Diabetes mellitus     Hyperlipidemia     Renal cyst     right    S/P right mastectomy 11/06/2024       History of Present Illness  The patient is a 79-year-old female who presented to the office today as a walk-in secondary to recent onset of cold symptoms. She endorses congestion and chest tightness.    She reports experiencing a sinus headache upon awakening, which was followed by the development of chest discomfort during her journey to the clinic. She has not been taking any allergy medications. She is scheduled for an all-day playdate tomorrow, starting at 9:00 AM, and is concerned about potential transmission of her symptoms. She is currently receiving biweekly allergy injections, a regimen that started on 03/01/2025.    She denies any fever, chills, or bodyaches.  She experiences sinus pain/pressure with headache and nasal congestion with rhinorrhea.  No sore throat.  No significant cough or wheeze but does feel like she has some congestion in her chest.  No shortness of breath.  Denies nausea, vomiting, or abdominal pain.      Objective   Vital Signs:   Vitals:    03/31/25 0848   BP: 116/74   Pulse: 99   Temp: 97.3 °F (36.3 °C)   TempSrc: Temporal   SpO2: 100%   Weight: 56.7 kg (125 lb)   Height: 152.4 cm (60\")     Body mass index is 24.41 kg/m².    Wt Readings from Last 3 Encounters:   03/31/25 56.7 kg (125 lb)   01/24/25 56.7 kg (125 lb)   01/21/25 56.4 kg (124 lb 6.4 oz)     BP Readings from Last 3 Encounters:   03/31/25 116/74 "   01/24/25 116/70   01/21/25 95/63       Health Maintenance   Topic Date Due    DIABETIC EYE EXAM  03/22/2025    COVID-19 Vaccine (5 - 2024-25 season) 04/14/2025 (Originally 9/1/2024)    RSV Vaccine - Adults (1 - 1-dose 75+ series) 01/24/2026 (Originally 9/28/2021)    DXA SCAN  07/07/2025    HEMOGLOBIN A1C  07/24/2025    ANNUAL WELLNESS VISIT  07/25/2025    LUNG CANCER SCREENING  07/26/2025    LIPID PANEL  01/24/2026    URINE MICROALBUMIN-CREATININE RATIO (uACR)  01/24/2026    TDAP/TD VACCINES (2 - Td or Tdap) 01/18/2029    HEPATITIS C SCREENING  Completed    INFLUENZA VACCINE  Completed    Pneumococcal Vaccine 50+  Completed    ZOSTER VACCINE  Completed    MAMMOGRAM  Discontinued       Physical Exam  Vitals reviewed.   Constitutional:       General: She is not in acute distress.     Appearance: Normal appearance. She is well-developed and normal weight. She is not ill-appearing.   HENT:      Head: Normocephalic and atraumatic.      Right Ear: Tympanic membrane, ear canal and external ear normal. There is no impacted cerumen.      Left Ear: Tympanic membrane, ear canal and external ear normal. There is no impacted cerumen.      Nose: Congestion present. No rhinorrhea.      Mouth/Throat:      Mouth: Mucous membranes are moist.      Pharynx: Oropharynx is clear. No oropharyngeal exudate or posterior oropharyngeal erythema.   Eyes:      General: No scleral icterus.        Right eye: No discharge.         Left eye: No discharge.      Extraocular Movements: Extraocular movements intact.      Conjunctiva/sclera: Conjunctivae normal.   Neck:      Trachea: Trachea normal.   Cardiovascular:      Rate and Rhythm: Normal rate and regular rhythm.      Pulses: Normal pulses.      Heart sounds: No murmur heard.  Pulmonary:      Effort: Pulmonary effort is normal.      Breath sounds: Normal breath sounds. No wheezing, rhonchi or rales.   Musculoskeletal:         General: Normal range of motion.      Cervical back: Normal range of  motion and neck supple. No tenderness.   Lymphadenopathy:      Cervical: No cervical adenopathy.   Skin:     General: Skin is warm and dry.   Neurological:      Mental Status: She is alert and oriented to person, place, and time.   Psychiatric:         Mood and Affect: Mood and affect normal.         Behavior: Behavior normal.         Thought Content: Thought content normal.         Judgment: Judgment normal.       Physical Exam  Lungs sound normal.      Result Review :  The following data was reviewed by: BETSY Rocha on 03/31/2025:    Office Visit on 03/31/2025   Component Date Value    SARS Antigen 03/31/2025 Not Detected     Influenza A Antigen DELFINO 03/31/2025 Not Detected     Influenza B Antigen DELFINO 03/31/2025 Not Detected     Internal Control 03/31/2025 Passed     Lot Number 03/31/2025 [id number]     Expiration Date 03/31/2025 [id number]        Procedures        Assessment and Plan   Diagnoses and all orders for this visit:    1. Acute nonintractable headache, unspecified headache type (Primary)  -     POCT SARS-CoV-2 Antigen DELFINO + Flu  -     Respiratory Panel PCR w/COVID-19(SARS-CoV-2) SKYLER/TABATHA/CARY/PAD/COR/GWEN In-House, NP Swab in UTM/VTM, 2 HR TAT - Swab, Nasopharynx    2. Nasal congestion  -     Respiratory Panel PCR w/COVID-19(SARS-CoV-2) SKYLER/TABTAHA/CARY/PAD/COR/GWEN In-House, NP Swab in UTM/VTM, 2 HR TAT - Swab, Nasopharynx    3. Flu-like symptoms  -     Respiratory Panel PCR w/COVID-19(SARS-CoV-2) SKYLER/TABATHA/CARY/PAD/COR/GWEN In-House, NP Swab in UTM/VTM, 2 HR TAT - Swab, Nasopharynx    4. Other immunodeficiencies  -     Respiratory Panel PCR w/COVID-19(SARS-CoV-2) SKYLER/TABATHA/CARY/PAD/COR/GWEN In-House, NP Swab in UTM/VTM, 2 HR TAT - Swab, Nasopharynx        Assessment & Plan  1. Recent onset of cold symptoms.  She reports congestion and chest tightness that began this morning. An in-office nasal swab was negative, but given the recent onset, a send-out test will be conducted to check for several viruses,  including rhinovirus, enterovirus, and non-COVID-19 coronaviruses. She is advised to start taking Claritin or Zyrtec and Flonase. She can continue with her scheduled allergy injections as she is not experiencing any fever. If the results are negative, further evaluation will be done based on her condition in the morning. If she does not receive a call by 8:15 AM, she should contact the office.      BMI is within normal parameters. No other follow-up for BMI required.         FOLLOW UP  Return if symptoms worsen or fail to improve.    Patient was given instructions and counseling regarding her condition or for health maintenance advice. Please see specific information pulled into the AVS if appropriate.       BETSY Rocha  03/31/25  09:36 EDT    CURRENT & DISCONTINUED MEDICATIONS  Current Outpatient Medications   Medication Instructions    anastrozole (ARIMIDEX) 1 mg, Oral, Daily, This is to get the patient through until her follow up visit    anastrozole (ARIMIDEX) 1 mg, Oral, Daily    atorvastatin (LIPITOR) 20 mg, Oral, Every Night at Bedtime    carvedilol (COREG) 6.25 MG tablet 1 tablet, 2 times daily    cyanocobalamin (VITAMIN B-12) 1,000 mcg    EPINEPHrine (EPIPEN) 0.3 MG/0.3ML solution auto-injector injection USE AS DIRECTED for severe allergic reaction    fluticasone (FLONASE) 50 MCG/ACT nasal spray 2 sprays, Nasal, Daily    ipratropium (ATROVENT) 0.06 % nasal spray use 2 sprays in each nostril EVERY 6 HOURS AS NEEDED    losartan (COZAAR) 25 MG tablet 1 tablet, Daily    metFORMIN (GLUCOPHAGE) 500 mg, Oral, 2 Times Daily With Meals    Omega-3 Fatty Acids (OMEGA 3 500 PO) 500 mg, 2 Times Daily    vitamin D (ERGOCALCIFEROL) 50,000 Units, Oral, Every 7 Days       There are no discontinued medications.     Patient or patient representative verbalized consent for the use of Ambient Listening during the visit with  BETSY Rocha for chart documentation. 3/31/2025  09:36 EDT

## 2025-04-01 ENCOUNTER — CLINICAL SUPPORT (OUTPATIENT)
Dept: FAMILY MEDICINE CLINIC | Facility: CLINIC | Age: 80
End: 2025-04-01
Payer: MEDICARE

## 2025-04-01 DIAGNOSIS — J30.9 ALLERGIC RHINITIS, UNSPECIFIED SEASONALITY, UNSPECIFIED TRIGGER: Primary | ICD-10-CM

## 2025-04-01 PROCEDURE — 95117 IMMUNOTHERAPY INJECTIONS: CPT | Performed by: NURSE PRACTITIONER

## 2025-04-07 ENCOUNTER — CLINICAL SUPPORT (OUTPATIENT)
Dept: FAMILY MEDICINE CLINIC | Facility: CLINIC | Age: 80
End: 2025-04-07
Payer: MEDICARE

## 2025-04-07 DIAGNOSIS — J30.9 ALLERGIC RHINITIS, UNSPECIFIED SEASONALITY, UNSPECIFIED TRIGGER: Primary | ICD-10-CM

## 2025-04-09 ENCOUNTER — CLINICAL SUPPORT (OUTPATIENT)
Dept: FAMILY MEDICINE CLINIC | Facility: CLINIC | Age: 80
End: 2025-04-09
Payer: MEDICARE

## 2025-04-09 DIAGNOSIS — J30.9 ALLERGIC RHINITIS, UNSPECIFIED SEASONALITY, UNSPECIFIED TRIGGER: Primary | ICD-10-CM

## 2025-04-14 ENCOUNTER — CLINICAL SUPPORT (OUTPATIENT)
Dept: FAMILY MEDICINE CLINIC | Facility: CLINIC | Age: 80
End: 2025-04-14
Payer: MEDICARE

## 2025-04-14 DIAGNOSIS — J30.9 ALLERGIC RHINITIS, UNSPECIFIED SEASONALITY, UNSPECIFIED TRIGGER: Primary | ICD-10-CM

## 2025-04-14 PROCEDURE — 95117 IMMUNOTHERAPY INJECTIONS: CPT | Performed by: NURSE PRACTITIONER

## 2025-04-16 ENCOUNTER — CLINICAL SUPPORT (OUTPATIENT)
Dept: FAMILY MEDICINE CLINIC | Facility: CLINIC | Age: 80
End: 2025-04-16
Payer: MEDICARE

## 2025-04-16 DIAGNOSIS — J30.9 ALLERGIC RHINITIS, UNSPECIFIED SEASONALITY, UNSPECIFIED TRIGGER: Primary | ICD-10-CM

## 2025-04-16 PROCEDURE — 95117 IMMUNOTHERAPY INJECTIONS: CPT | Performed by: NURSE PRACTITIONER

## 2025-04-18 ENCOUNTER — LAB (OUTPATIENT)
Dept: LAB | Facility: HOSPITAL | Age: 80
End: 2025-04-18
Payer: MEDICARE

## 2025-04-18 DIAGNOSIS — E55.9 VITAMIN D DEFICIENCY: ICD-10-CM

## 2025-04-18 DIAGNOSIS — C50.919 INVASIVE CARCINOMA OF BREAST: ICD-10-CM

## 2025-04-18 LAB
25(OH)D3 SERPL-MCNC: 92.1 NG/ML (ref 30–100)
ALBUMIN SERPL-MCNC: 4.2 G/DL (ref 3.5–5.2)
ALBUMIN/GLOB SERPL: 1.6 G/DL
ALP SERPL-CCNC: 87 U/L (ref 39–117)
ALT SERPL W P-5'-P-CCNC: 13 U/L (ref 1–33)
ANION GAP SERPL CALCULATED.3IONS-SCNC: 11.8 MMOL/L (ref 5–15)
AST SERPL-CCNC: 16 U/L (ref 1–32)
BASOPHILS # BLD AUTO: 0.08 10*3/MM3 (ref 0–0.2)
BASOPHILS NFR BLD AUTO: 1.1 % (ref 0–1.5)
BILIRUB SERPL-MCNC: 0.8 MG/DL (ref 0–1.2)
BUN SERPL-MCNC: 19 MG/DL (ref 8–23)
BUN/CREAT SERPL: 19.4 (ref 7–25)
CALCIUM SPEC-SCNC: 9.7 MG/DL (ref 8.6–10.5)
CHLORIDE SERPL-SCNC: 103 MMOL/L (ref 98–107)
CO2 SERPL-SCNC: 24.2 MMOL/L (ref 22–29)
CREAT SERPL-MCNC: 0.98 MG/DL (ref 0.57–1)
DEPRECATED RDW RBC AUTO: 51.7 FL (ref 37–54)
EGFRCR SERPLBLD CKD-EPI 2021: 59.2 ML/MIN/1.73
EOSINOPHIL # BLD AUTO: 0.14 10*3/MM3 (ref 0–0.4)
EOSINOPHIL NFR BLD AUTO: 1.9 % (ref 0.3–6.2)
ERYTHROCYTE [DISTWIDTH] IN BLOOD BY AUTOMATED COUNT: 14.5 % (ref 12.3–15.4)
GLOBULIN UR ELPH-MCNC: 2.6 GM/DL
GLUCOSE SERPL-MCNC: 103 MG/DL (ref 65–99)
HCT VFR BLD AUTO: 40.3 % (ref 34–46.6)
HGB BLD-MCNC: 12.6 G/DL (ref 12–15.9)
IMM GRANULOCYTES # BLD AUTO: 0.03 10*3/MM3 (ref 0–0.05)
IMM GRANULOCYTES NFR BLD AUTO: 0.4 % (ref 0–0.5)
LYMPHOCYTES # BLD AUTO: 2.03 10*3/MM3 (ref 0.7–3.1)
LYMPHOCYTES NFR BLD AUTO: 27.1 % (ref 19.6–45.3)
MCH RBC QN AUTO: 30.7 PG (ref 26.6–33)
MCHC RBC AUTO-ENTMCNC: 31.3 G/DL (ref 31.5–35.7)
MCV RBC AUTO: 98.1 FL (ref 79–97)
MONOCYTES # BLD AUTO: 0.72 10*3/MM3 (ref 0.1–0.9)
MONOCYTES NFR BLD AUTO: 9.6 % (ref 5–12)
NEUTROPHILS NFR BLD AUTO: 4.48 10*3/MM3 (ref 1.7–7)
NEUTROPHILS NFR BLD AUTO: 59.9 % (ref 42.7–76)
NRBC BLD AUTO-RTO: 0 /100 WBC (ref 0–0.2)
PLATELET # BLD AUTO: 312 10*3/MM3 (ref 140–450)
PMV BLD AUTO: 11.8 FL (ref 6–12)
POTASSIUM SERPL-SCNC: 4.6 MMOL/L (ref 3.5–5.2)
PROT SERPL-MCNC: 6.8 G/DL (ref 6–8.5)
RBC # BLD AUTO: 4.11 10*6/MM3 (ref 3.77–5.28)
SODIUM SERPL-SCNC: 139 MMOL/L (ref 136–145)
WBC NRBC COR # BLD AUTO: 7.48 10*3/MM3 (ref 3.4–10.8)

## 2025-04-18 PROCEDURE — 82306 VITAMIN D 25 HYDROXY: CPT

## 2025-04-18 PROCEDURE — 36415 COLL VENOUS BLD VENIPUNCTURE: CPT

## 2025-04-18 PROCEDURE — 80053 COMPREHEN METABOLIC PANEL: CPT

## 2025-04-18 PROCEDURE — 85025 COMPLETE CBC W/AUTO DIFF WBC: CPT

## 2025-04-19 DIAGNOSIS — C50.919 INVASIVE CARCINOMA OF BREAST: ICD-10-CM

## 2025-04-21 ENCOUNTER — CLINICAL SUPPORT (OUTPATIENT)
Dept: FAMILY MEDICINE CLINIC | Facility: CLINIC | Age: 80
End: 2025-04-21
Payer: MEDICARE

## 2025-04-21 DIAGNOSIS — J30.9 ALLERGIC RHINITIS, UNSPECIFIED SEASONALITY, UNSPECIFIED TRIGGER: Primary | ICD-10-CM

## 2025-04-21 PROCEDURE — 95117 IMMUNOTHERAPY INJECTIONS: CPT | Performed by: NURSE PRACTITIONER

## 2025-04-21 RX ORDER — ANASTROZOLE 1 MG/1
1 TABLET ORAL DAILY
Qty: 90 TABLET | Refills: 3 | Status: SHIPPED | OUTPATIENT
Start: 2025-04-21 | End: 2025-04-22 | Stop reason: SDUPTHER

## 2025-04-21 NOTE — PROGRESS NOTES
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 Cristel Alicea Mariya Pittman is a 79 y.o. female who presents to [facility]EMAT[facility]Y [facility] ON[facility]Y for Right HR + Breast cancer, cT1,cN0Mx diagnosed 9/23/2024.    Patient underwent screening mammogram on 7/26/2024, which revealed questionable architectural distortion right breast, she subsequently underwent diagnostic mammogram on 9/3/2024 which revealed high suspicious mass in the right breast measuring 6 mm on mammogram corresponding to architectural distortion in the right breast at 12:00, also incidentally seen was a smoothly marginated hypoechoic 6 mm mass at 11:00.    Pathology report from right breast biopsy at 12 o'clock position on 9/23/2024 revealed at 11 o'clock position, 4 cm from nipple, invasive carcinoma no special type (ductal), grade 1, ER +95%, [place] +50%, HER2 equivocal by IHC 2+, HER2 FISH was negative.    Past medical history: Type 2 diabetes, CKD stage III, hyperlipidemia    Social history: Quit smoking tobacco in 2018, used to smoke half a pack per day for 30 to 40 years    Family history: Patient reports 2 sisters with breast cancer diagnosed in their 40s in 1987, reports 1 sister also head rectal cancer along with breast cancer.      Interval history: Patient here to follow-up regarding adjuvant endocrine treatment after undergoing right mastectomy in November 2024. Pt is currently receiving treatment with anastrozole. Pt denies any side effects to  "Anastrozole. No breast concerns reported.   History of Present Illness         Review of Systems   All other systems reviewed and are negative.       Oncology/Hematology History    No history exists.       Objective     Vitals:    04/22/25 1032   BP: 109/79   Pulse: 87   Resp: 16   Temp: 97.6 °F (36.4 °C)   TempSrc: Temporal   SpO2: 98%   Weight: 57.2 kg (126 lb 3.2 oz)   Height: 152.4 cm (60\")   PainSc: 0-No pain           ECOG score: 0         PHQ-9 Total Score:         Physical Exam  Vitals reviewed. Exam conducted with a chaperone present.   Constitutional:       General: She is not in acute distress.     Appearance: Normal appearance.   HENT:      Head: Normocephalic and atraumatic.   Eyes:      Extraocular Movements: Extraocular movements intact.      Conjunctiva/sclera: Conjunctivae normal.   Pulmonary:      Effort: Pulmonary effort is normal.   Musculoskeletal:      Cervical back: Normal range of motion and neck supple.   Skin:     General: Skin is warm and dry.      Findings: No bruising.   Neurological:      Mental Status: She is oriented to person, place, and time.         Physical Exam        Past Medical History     Past Medical History:   Diagnosis Date    Cancer     BREAST    Cardiomyopathy     PT STATES R/T TO PAST MEDS/NO ISSUES PER PATIENT, FOLLOWS W/PCP-B. VESSELS    CKD (chronic kidney disease) stage 3, GFR 30-59 ml/min     FOLLOWS W/ TICO GR    Diabetes mellitus     Hyperlipidemia     Renal cyst     right    S/P right mastectomy 11/06/2024     Current Outpatient Medications on File Prior to Visit   Medication Sig Dispense Refill    atorvastatin (LIPITOR) 20 MG tablet TAKE 1 TABLET BY MOUTH AT NIGHT 90 tablet 3    CALCIUM PO Take 600 mg by mouth Daily. 1.5 tablets      carvedilol (COREG) 6.25 MG tablet Take 1 tablet by mouth 2 (two) times a day.      cyanocobalamin (VITAMIN B-12) 1000 MCG tablet Take 1 tablet by mouth.      fluticasone (FLONASE) 50 MCG/ACT nasal spray 2 sprays into the " nostril(s) as directed by provider Daily. 48 g 3    Loratadine (CLARITIN PO) Take  by mouth.      losartan (COZAAR) 25 MG tablet Take 1 tablet by mouth Daily.      metFORMIN (GLUCOPHAGE) 500 MG tablet TAKE 1 TABLET BY MOUTH TWICE  DAILY WITH MEALS 180 tablet 0    vitamin D (ERGOCALCIFEROL) 1.25 MG (79499 UT) capsule capsule Take 1 capsule by mouth Every 7 (Seven) Days. 8 capsule 0    [DISCONTINUED] anastrozole (ARIMIDEX) 1 MG tablet TAKE 1 TABLET BY MOUTH DAILY 90 tablet 3    EPINEPHrine (EPIPEN) 0.3 MG/0.3ML solution auto-injector injection USE AS DIRECTED for severe allergic reaction (Patient not taking: Reported on 4/22/2025)      ipratropium (ATROVENT) 0.06 % nasal spray use 2 sprays in each nostril EVERY 6 HOURS AS NEEDED (Patient not taking: Reported on 4/22/2025)      Omega-3 Fatty Acids (OMEGA 3 500 PO) Take 500 mg by mouth 2 (Two) Times a Day. (Patient not taking: Reported on 4/22/2025)       No current facility-administered medications on file prior to visit.      Allergies   Allergen Reactions    Penicillins Unknown - Low Severity     Beta lactam allergy details  Antibiotic reaction: unknown  Age at reaction: adult  Dose to reaction time: days  Reason for antibiotic: unknown  Epinephrine required for reaction?: no  Tolerated antibiotics: unknown       Farxiga [Dapagliflozin] Headache     Past Surgical History:   Procedure Laterality Date    BREAST SURGERY Left     lump removed    COLON SURGERY      COLOSTOMY AND REVERSAL    LIVER SURGERY      exploritory surgery due to puncture to liver     MASTECTOMY WITH SENTINEL NODE BIOPSY AND AXILLARY NODE DISSECTION Right 11/5/2024    Procedure: RIGHT BREAST MASTECTOMY WITH RIGHT SENTINEL NODE BIOPSY AND AXILLARY NODE DISSECTION;  Surgeon: Cristel Schwartz MD;  Location: [facility];  Service: General;  Laterality: Right;    SUBTOTAL HYSTERECTOMY       Social History     Socioeconomic History    Marital status:    Tobacco Use    Smoking status: Former      Current packs/day: 0.00     Average packs/day: 0.5 packs/day for 45.0 years (22.5 ttl pk-yrs)     Types: Cigarettes     Start date:      Quit date: 2018     Years since quittin.2     Passive exposure: Past    Smokeless tobacco: Never   Vaping Use    Vaping status: Never Used   Substance and Sexual Activity    Alcohol use: Never    Drug use: Never    Sexual activity: Defer     Family History   Problem Relation Age of Onset    Skin cancer Mother         skin carcinoma    Breast cancer Sister     Lupus Sister         lupus erythematosus    Breast cancer Sister     Rectal cancer Sister        Results     Result Review   The following data was reviewed by: Darby Cole MD on 10/03/2024:  Lab Results   Component Value Date    HGB 12.6 2025    HCT 40.3 2025    MCV 98.1 (H) 2025     2025    WBC 7.48 2025    NEUTROABS 4.48 2025    LYMPHSABS 2.03 2025    MONOSABS 0.72 2025    EOSABS 0.14 2025    BASOSABS 0.08 2025     Lab Results   Component Value Date    GLUCOSE 103 (H) 2025    BUN 19 2025    CREATININE 0.98 2025     2025    K 4.6 2025     2025    CO2 24.2 2025    CALCIUM 9.7 2025    PROTEINTOT 6.8 2025    ALBUMIN 4.2 2025    BILITOT 0.8 2025    ALKPHOS 87 2025    AST 16 2025    ALT 13 2025     Lab Results   Component Value Date    MG 2.1 2021    PHOS 3.8 2021    FREET4 1.25 2025    TSH 1.590 2025       Surgical Pathology Report                         Case: [id number]                                   Authorizing Provider:  Tj Hernandez MD  Collected:           2024 02:05 PM           Ordering Location:     [facility]      Received:            2024 06:55 AM                                  MAMMOGRAPHY                                                                   Pathologist:            Michelle Urena DO                                                       Specimens:   1) - Breast, Right, RT BREAST U/S BX/1200, 4CMFN                                                    2) - Breast, Right, RT BREAST U/S BX/1100,4CMFN                                            Clinical Information    Right breast subareolar mass   Final Diagnosis   1. Right breast,  12 o'clock position, 4 cm from nipple, ultrasound-guided core biopsy:               - Fibroadenomatoid change  - Columnar cell change   - Fibrosis        2. 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)  - Histologic grade (Niko Histologic Score):    - Glandular (Acinar)/Tubular Differentiation:  Score 3  - Nuclear Pleomorphism:  Score 1  - Mitotic Rate:  Score 1  - Overall Grade:  Grade 1               - Size of largest focus of invasion:  8 mm               - Breast biomarker testing:                            - Estrogen Receptor (ER):  Positive (95%, strong)                            - Progesterone Receptor (PgR):  Positive (15%, strong)                            - HER2 (by immunohistochemistry):  Equivocal (Score 2+), see comment  - Ki-67:  12%               - Other findings:                            - 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 LUIS FERNANDO Hernandez at 16:11 on 9/25/2024 by Zia KOTHARI.           Comment:  HER2 FISH has been ordered.  See separate reference laboratory report for results.    Electronically signed by Michelle Urena DO on 9/25/2024 at 1641   Synoptic Checklist   Breast Biomarker Reporting Template   Protocol posted: 12/13/2023BREAST BIOMARKER REPORTING TEMPLATE - All Specimens  Test(s) Performed     Estrogen Receptor (ER) Status  Positive (greater  than 10% of cells demonstrate nuclear positivity)   Percentage of Cells with Nuclear Positivity  95 %   Average Intensity of Staining  Strong   Test Type  Food and Drug Administration (FDA) cleared (test / vendor): Roche   Primary Antibody  SP1   Test(s) Performed     Progesterone Receptor (PgR) Status  Positive   Percentage of Cells with Nuclear Positivity  15 %   Average Intensity of Staining  Strong   Test Type  Food and Drug Administration (FDA) cleared (test / vendor): Roche   Primary Antibody  1E2   Test(s) Performed     HER2 by Immunohistochemistry  Equivocal (Score 2+)   Percentage of Cells with Uniform Intense Complete Membrane Staining  0 %   Test Type  Food and Drug Administration (FDA) cleared (test / vendor): Roche   Primary Antibody  CB11   Test(s) Performed  Ki-67   Ki-67 Percentage of Positive Nuclei  12 %   Cold Ischemia and Fixation Times  Meet requirements specified in latest version of the ASCO / CAP Guidelines          No radiology results for the last day       Assessment & Plan     Diagnoses and all orders for this visit:    1. Encounter for screening mammogram for breast cancer (Primary)  -     Mammo Screening Modified With Tomosynthesis Left With CAD; Future    2. Invasive carcinoma of breast  -     anastrozole (ARIMIDEX) 1 MG tablet; Take 1 tablet by mouth Daily.  Dispense: 90 tablet; Refill: 1  -     CBC & Differential; Future  -     Comprehensive Metabolic Panel; Future    3. Vitamin D deficiency  -     Vitamin D,25-Hydroxy; Future        Deanne Pittman is a 79 y.o. female who presents to [facility]EMATOLOGY [facility] for Right HR + Breast cancer, Invasive carcinoma no special type (ductal), grade 1, ER +95%, [place] +50%, HER2 equivocal by IHC 2+, HER2 FISH was negative, pT2pN0, oncotype DX score of 15    -Reviewed mammogram, breast biopsy results with patient today and discussed diagnosis with patient.  -Patient is s/p right mastectomy performed by Dr. Schwartz  -  Obtained Oncotype DX score report determine she requires adjuvant chemotherapy, pt with low score of 15, thus discussed that patient does not require adjuvant chemotherapy due to lack of benefit.  - Shared with her at a minimum she require 5 years of endocrine therapy with anastrozole, provided handout and discussed common side effects of this medication.  -Prescribed anastrozole on 12/12/2024, pt tolerating it well after 1 month, reviewed most recent labs, provided refills of Anastrozole, recommend pt continue Anastrozole  -from drug interaction check today, there does not appear to be an interaction between Atorvastatin and Anastrozole, informed pt of this today, if she has other questions about her statin recommend she discuss with her PCP an her next appointment, I do not recommend she stop atorvastatin without speaking with her PCP beforehand.    4/22/2025: pt here for anastrozole toxicity check, patient tolerating anastrozole well, again no report of significant hot flashes, mood disturbances.  Patient due for mammogram in July 2025 ordered today, patient not cleared to begin bisphosphonate this patient is on calcium vitamin D per nephrology. Thus pt will continue calcium and Vit D supplementation. Recommend pt continue anastrozole 1 mg PO QD    Family history of cancer  - Patient with 2 sisters with breast cancer diagnosed in the 40s, 1 sister also had rectal cancer  - Thus ordered [facility] genetic testing, in October 2024, which revealed a BARD1 mutation, refer patient to have genetic counseling as especially given patient having several questions regarding testing for her daughter    Osteoporosis  -seen on DEXA scan on 7/7/2023  -recommended pt discuss starting Zometa or prolia with nephrologist,pt was not cleared to start either of these medications, her nephrologist recommended calcium and Vit D supplementation alone  -given Vit D deficiency on labs, ordered Vit D supplementation 50K IU weekly for 8 weeks  followed by dose of Vit D recommended by her nephrologist along with calcium supplementation  -also recommend dental evaluation for clearance prior to starting these medications as well.     Discussed plan to have patient follow-up in 3 months with labs CBC, CMP, Vit D and with mammogram results.     Please note that portions of this note were completed with a voice recognition program.        Electronically signed by Darby Cole MD, 04/22/25, 5:50 PM EDT.      Assessment & Plan      Follow Up     I spent 30 minutes caring for Deanne on this date of service. This time includes time spent by me in the following activities:preparing for the visit, reviewing tests, obtaining and/or reviewing a separately obtained history, performing a medically appropriate examination and/or evaluation , counseling and educating the patient/family/caregiver, ordering medications, tests, or procedures, referring and communicating with other health care professionals , documenting information in the medical record, independently interpreting results and communicating that information with the patient/family/caregiver, and care coordination    Any chemotherapy or immunotherapy or other systemic therapy treatment plan involves a high risk of complications and/or mortality of patient management.    The patient was seen and examined. Work by the provider also included review and/or ordering of lab tests, review and/or ordering of radiology tests, review and/or ordering of medicine tests, discussion with other physicians or providers, independent review of data, obtaining old records, review/summation of old records, and/or other review.    I have reviewed the family history, social history, and past medical history for this patient. Previous information and data has been reviewed and updated as needed. I have reviewed and verified the chief complaint, history, and other documentation. The patient was interviewed and examined in the clinic and  the chart reviewed. The previous observations, recommendations, and conclusions were reviewed including those of other providers.     The plan was discussed with the patient and/or family. The patient was given time to ask questions and these questions were answered. At the conclusion of their visit they had no additional questions or concerns and all questions were answered to their satisfaction.    Patient was given instructions and counseling regarding her condition or for health maintenance advice. Please see specific information pulled into the AVS if appropriate.

## 2025-04-22 ENCOUNTER — OFFICE VISIT (OUTPATIENT)
Dept: ONCOLOGY | Facility: HOSPITAL | Age: 80
End: 2025-04-22
Payer: MEDICARE

## 2025-04-22 VITALS
WEIGHT: 126.2 LBS | BODY MASS INDEX: 24.77 KG/M2 | HEIGHT: 60 IN | TEMPERATURE: 97.6 F | RESPIRATION RATE: 16 BRPM | SYSTOLIC BLOOD PRESSURE: 109 MMHG | HEART RATE: 87 BPM | OXYGEN SATURATION: 98 % | DIASTOLIC BLOOD PRESSURE: 79 MMHG

## 2025-04-22 DIAGNOSIS — C50.919 INVASIVE CARCINOMA OF BREAST: ICD-10-CM

## 2025-04-22 DIAGNOSIS — Z12.31 ENCOUNTER FOR SCREENING MAMMOGRAM FOR BREAST CANCER: Primary | ICD-10-CM

## 2025-04-22 DIAGNOSIS — E55.9 VITAMIN D DEFICIENCY: ICD-10-CM

## 2025-04-22 PROCEDURE — G0463 HOSPITAL OUTPT CLINIC VISIT: HCPCS | Performed by: INTERNAL MEDICINE

## 2025-04-22 RX ORDER — ANASTROZOLE 1 MG/1
1 TABLET ORAL DAILY
Qty: 90 TABLET | Refills: 1 | Status: SHIPPED | OUTPATIENT
Start: 2025-04-22

## 2025-04-23 ENCOUNTER — CLINICAL SUPPORT (OUTPATIENT)
Dept: FAMILY MEDICINE CLINIC | Facility: CLINIC | Age: 80
End: 2025-04-23
Payer: MEDICARE

## 2025-04-23 ENCOUNTER — TELEPHONE (OUTPATIENT)
Dept: FAMILY MEDICINE CLINIC | Facility: CLINIC | Age: 80
End: 2025-04-23

## 2025-04-23 DIAGNOSIS — I10 ESSENTIAL (PRIMARY) HYPERTENSION: ICD-10-CM

## 2025-04-23 DIAGNOSIS — N18.30 CONTROLLED TYPE 2 DIABETES MELLITUS WITH STAGE 3 CHRONIC KIDNEY DISEASE, WITHOUT LONG-TERM CURRENT USE OF INSULIN: Primary | ICD-10-CM

## 2025-04-23 DIAGNOSIS — J30.9 ALLERGIC RHINITIS, UNSPECIFIED SEASONALITY, UNSPECIFIED TRIGGER: Primary | ICD-10-CM

## 2025-04-23 DIAGNOSIS — E11.22 CONTROLLED TYPE 2 DIABETES MELLITUS WITH STAGE 3 CHRONIC KIDNEY DISEASE, WITHOUT LONG-TERM CURRENT USE OF INSULIN: Primary | ICD-10-CM

## 2025-04-23 DIAGNOSIS — E78.2 MIXED HYPERLIPIDEMIA: ICD-10-CM

## 2025-04-23 PROCEDURE — 95117 IMMUNOTHERAPY INJECTIONS: CPT | Performed by: NURSE PRACTITIONER

## 2025-04-23 NOTE — TELEPHONE ENCOUNTER
She has an appt to see Dr Cole on 8/1/25      She said you would add the labs you wanted so she only has to be stuck one time.   She is getting her lbs drawn for Dr Cole on 7/28/2025

## 2025-04-28 ENCOUNTER — CLINICAL SUPPORT (OUTPATIENT)
Dept: FAMILY MEDICINE CLINIC | Facility: CLINIC | Age: 80
End: 2025-04-28
Payer: MEDICARE

## 2025-04-28 DIAGNOSIS — J30.9 ALLERGIC RHINITIS, UNSPECIFIED SEASONALITY, UNSPECIFIED TRIGGER: Primary | ICD-10-CM

## 2025-04-28 PROCEDURE — 95117 IMMUNOTHERAPY INJECTIONS: CPT | Performed by: NURSE PRACTITIONER

## 2025-04-30 ENCOUNTER — CLINICAL SUPPORT (OUTPATIENT)
Dept: FAMILY MEDICINE CLINIC | Facility: CLINIC | Age: 80
End: 2025-04-30
Payer: MEDICARE

## 2025-04-30 DIAGNOSIS — J30.9 ALLERGIC RHINITIS, UNSPECIFIED SEASONALITY, UNSPECIFIED TRIGGER: Primary | ICD-10-CM

## 2025-04-30 PROCEDURE — 95117 IMMUNOTHERAPY INJECTIONS: CPT | Performed by: NURSE PRACTITIONER

## 2025-05-04 DIAGNOSIS — N18.30 CONTROLLED TYPE 2 DIABETES MELLITUS WITH STAGE 3 CHRONIC KIDNEY DISEASE, WITHOUT LONG-TERM CURRENT USE OF INSULIN: ICD-10-CM

## 2025-05-04 DIAGNOSIS — E11.22 CONTROLLED TYPE 2 DIABETES MELLITUS WITH STAGE 3 CHRONIC KIDNEY DISEASE, WITHOUT LONG-TERM CURRENT USE OF INSULIN: ICD-10-CM

## 2025-05-05 ENCOUNTER — CLINICAL SUPPORT (OUTPATIENT)
Dept: FAMILY MEDICINE CLINIC | Facility: CLINIC | Age: 80
End: 2025-05-05
Payer: MEDICARE

## 2025-05-05 DIAGNOSIS — J30.9 ALLERGIC RHINITIS, UNSPECIFIED SEASONALITY, UNSPECIFIED TRIGGER: Primary | ICD-10-CM

## 2025-05-05 PROCEDURE — 95117 IMMUNOTHERAPY INJECTIONS: CPT | Performed by: NURSE PRACTITIONER

## 2025-05-06 ENCOUNTER — CLINICAL SUPPORT (OUTPATIENT)
Dept: FAMILY MEDICINE CLINIC | Facility: CLINIC | Age: 80
End: 2025-05-06
Payer: MEDICARE

## 2025-05-06 DIAGNOSIS — J30.9 ALLERGIC RHINITIS, UNSPECIFIED SEASONALITY, UNSPECIFIED TRIGGER: Primary | ICD-10-CM

## 2025-05-06 PROCEDURE — 95117 IMMUNOTHERAPY INJECTIONS: CPT | Performed by: FAMILY MEDICINE

## 2025-05-08 ENCOUNTER — CLINICAL SUPPORT (OUTPATIENT)
Dept: FAMILY MEDICINE CLINIC | Facility: CLINIC | Age: 80
End: 2025-05-08
Payer: MEDICARE

## 2025-05-08 DIAGNOSIS — J30.9 ALLERGIC RHINITIS, UNSPECIFIED SEASONALITY, UNSPECIFIED TRIGGER: Primary | ICD-10-CM

## 2025-05-08 PROCEDURE — 95117 IMMUNOTHERAPY INJECTIONS: CPT | Performed by: NURSE PRACTITIONER

## 2025-05-12 ENCOUNTER — CLINICAL SUPPORT (OUTPATIENT)
Dept: FAMILY MEDICINE CLINIC | Facility: CLINIC | Age: 80
End: 2025-05-12
Payer: MEDICARE

## 2025-05-12 DIAGNOSIS — J30.9 ALLERGIC RHINITIS, UNSPECIFIED SEASONALITY, UNSPECIFIED TRIGGER: Primary | ICD-10-CM

## 2025-05-12 PROCEDURE — 95117 IMMUNOTHERAPY INJECTIONS: CPT | Performed by: NURSE PRACTITIONER

## 2025-05-14 ENCOUNTER — CLINICAL SUPPORT (OUTPATIENT)
Dept: FAMILY MEDICINE CLINIC | Facility: CLINIC | Age: 80
End: 2025-05-14
Payer: MEDICARE

## 2025-05-14 DIAGNOSIS — J30.9 ALLERGIC RHINITIS, UNSPECIFIED SEASONALITY, UNSPECIFIED TRIGGER: Primary | ICD-10-CM

## 2025-05-14 PROCEDURE — 95117 IMMUNOTHERAPY INJECTIONS: CPT | Performed by: NURSE PRACTITIONER

## 2025-05-15 ENCOUNTER — CLINICAL SUPPORT (OUTPATIENT)
Dept: FAMILY MEDICINE CLINIC | Facility: CLINIC | Age: 80
End: 2025-05-15
Payer: MEDICARE

## 2025-05-15 DIAGNOSIS — J30.9 ALLERGIC RHINITIS, UNSPECIFIED SEASONALITY, UNSPECIFIED TRIGGER: Primary | ICD-10-CM

## 2025-05-15 PROCEDURE — 95117 IMMUNOTHERAPY INJECTIONS: CPT | Performed by: NURSE PRACTITIONER

## 2025-05-19 ENCOUNTER — CLINICAL SUPPORT (OUTPATIENT)
Dept: FAMILY MEDICINE CLINIC | Facility: CLINIC | Age: 80
End: 2025-05-19
Payer: MEDICARE

## 2025-05-19 DIAGNOSIS — J30.9 ALLERGIC RHINITIS, UNSPECIFIED SEASONALITY, UNSPECIFIED TRIGGER: Primary | ICD-10-CM

## 2025-05-19 PROCEDURE — 95117 IMMUNOTHERAPY INJECTIONS: CPT | Performed by: NURSE PRACTITIONER

## 2025-05-21 ENCOUNTER — CLINICAL SUPPORT (OUTPATIENT)
Dept: FAMILY MEDICINE CLINIC | Facility: CLINIC | Age: 80
End: 2025-05-21
Payer: MEDICARE

## 2025-05-21 DIAGNOSIS — J30.9 ALLERGIC RHINITIS, UNSPECIFIED SEASONALITY, UNSPECIFIED TRIGGER: Primary | ICD-10-CM

## 2025-05-21 PROCEDURE — 95117 IMMUNOTHERAPY INJECTIONS: CPT | Performed by: FAMILY MEDICINE

## 2025-05-22 ENCOUNTER — TELEPHONE (OUTPATIENT)
Dept: FAMILY MEDICINE CLINIC | Facility: CLINIC | Age: 80
End: 2025-05-22

## 2025-05-22 ENCOUNTER — CLINICAL SUPPORT (OUTPATIENT)
Dept: FAMILY MEDICINE CLINIC | Facility: CLINIC | Age: 80
End: 2025-05-22
Payer: MEDICARE

## 2025-05-22 DIAGNOSIS — E11.22 CONTROLLED TYPE 2 DIABETES MELLITUS WITH STAGE 3 CHRONIC KIDNEY DISEASE, WITHOUT LONG-TERM CURRENT USE OF INSULIN: ICD-10-CM

## 2025-05-22 DIAGNOSIS — J30.9 ALLERGIC RHINITIS, UNSPECIFIED SEASONALITY, UNSPECIFIED TRIGGER: Primary | ICD-10-CM

## 2025-05-22 DIAGNOSIS — N18.30 CONTROLLED TYPE 2 DIABETES MELLITUS WITH STAGE 3 CHRONIC KIDNEY DISEASE, WITHOUT LONG-TERM CURRENT USE OF INSULIN: ICD-10-CM

## 2025-05-22 PROCEDURE — 95117 IMMUNOTHERAPY INJECTIONS: CPT | Performed by: NURSE PRACTITIONER

## 2025-05-22 NOTE — TELEPHONE ENCOUNTER
Caller: [facility] - [place], [place] - [address] [phone] [facility] [phone] FX    Relationship: Pharmacy    Best call back number: [phone]    Requested Prescriptions:   Requested Prescriptions     Pending Prescriptions Disp Refills    metFORMIN (GLUCOPHAGE) 500 MG tablet 180 tablet 0     Sig: Take 1 tablet by mouth 2 (Two) Times a Day With Meals.        Pharmacy where request should be sent: [facility] - [place], [place] - [address] [phone] [facility] [phone] FX     Last office visit with prescribing clinician: 3/31/2025   Last telemedicine visit with prescribing clinician: Visit date not found   Next office visit with prescribing clinician: 7/31/2025     Additional details provided by patient: PATIENT IS OUT OF REFILLS ON THIS MEDICATION. PLEASE SEND NEW PRESCRIPTION TO PHARMACY ASAP WITH 90 DAY REFILLS EACH TIME AS THIS IS MAIL ORDER AND THEY DO NOT KNOW HOW MUCH MEDICATION PATIENT HAS LEFT.    Does the patient have less than a 3 day supply:  [] Yes  [x] No    Would you like a call back once the refill request has been completed: [] Yes [] No    If the office needs to give you a call back, can they leave a voicemail: [] Yes [] No    Rohini Avendano Rep   05/22/25 13:43 EDT

## 2025-05-27 ENCOUNTER — CLINICAL SUPPORT (OUTPATIENT)
Dept: FAMILY MEDICINE CLINIC | Facility: CLINIC | Age: 80
End: 2025-05-27
Payer: MEDICARE

## 2025-05-27 DIAGNOSIS — J30.9 ALLERGIC RHINITIS, UNSPECIFIED SEASONALITY, UNSPECIFIED TRIGGER: Primary | ICD-10-CM

## 2025-05-27 PROCEDURE — 95117 IMMUNOTHERAPY INJECTIONS: CPT | Performed by: NURSE PRACTITIONER

## 2025-05-28 ENCOUNTER — CLINICAL SUPPORT (OUTPATIENT)
Dept: FAMILY MEDICINE CLINIC | Facility: CLINIC | Age: 80
End: 2025-05-28
Payer: MEDICARE

## 2025-05-28 DIAGNOSIS — J30.9 ALLERGIC RHINITIS, UNSPECIFIED SEASONALITY, UNSPECIFIED TRIGGER: Primary | ICD-10-CM

## 2025-05-28 PROCEDURE — 95117 IMMUNOTHERAPY INJECTIONS: CPT | Performed by: NURSE PRACTITIONER

## 2025-05-29 ENCOUNTER — CLINICAL SUPPORT (OUTPATIENT)
Dept: FAMILY MEDICINE CLINIC | Facility: CLINIC | Age: 80
End: 2025-05-29
Payer: MEDICARE

## 2025-05-29 DIAGNOSIS — J30.9 ALLERGIC RHINITIS, UNSPECIFIED SEASONALITY, UNSPECIFIED TRIGGER: Primary | ICD-10-CM

## 2025-05-29 PROCEDURE — 95117 IMMUNOTHERAPY INJECTIONS: CPT | Performed by: NURSE PRACTITIONER

## 2025-06-02 ENCOUNTER — CLINICAL SUPPORT (OUTPATIENT)
Dept: FAMILY MEDICINE CLINIC | Facility: CLINIC | Age: 80
End: 2025-06-02
Payer: MEDICARE

## 2025-06-02 DIAGNOSIS — J30.9 ALLERGIC RHINITIS, UNSPECIFIED SEASONALITY, UNSPECIFIED TRIGGER: Primary | ICD-10-CM

## 2025-06-02 PROCEDURE — 95117 IMMUNOTHERAPY INJECTIONS: CPT | Performed by: NURSE PRACTITIONER

## 2025-06-03 ENCOUNTER — CLINICAL SUPPORT (OUTPATIENT)
Dept: FAMILY MEDICINE CLINIC | Facility: CLINIC | Age: 80
End: 2025-06-03
Payer: MEDICARE

## 2025-06-03 DIAGNOSIS — J30.9 ALLERGIC RHINITIS, UNSPECIFIED SEASONALITY, UNSPECIFIED TRIGGER: Primary | ICD-10-CM

## 2025-06-03 PROCEDURE — 95117 IMMUNOTHERAPY INJECTIONS: CPT | Performed by: FAMILY MEDICINE

## 2025-06-04 ENCOUNTER — CLINICAL SUPPORT (OUTPATIENT)
Dept: FAMILY MEDICINE CLINIC | Facility: CLINIC | Age: 80
End: 2025-06-04
Payer: MEDICARE

## 2025-06-04 DIAGNOSIS — J30.9 ALLERGIC RHINITIS, UNSPECIFIED SEASONALITY, UNSPECIFIED TRIGGER: Primary | ICD-10-CM

## 2025-06-04 PROCEDURE — 95117 IMMUNOTHERAPY INJECTIONS: CPT | Performed by: NURSE PRACTITIONER

## 2025-06-09 ENCOUNTER — CLINICAL SUPPORT (OUTPATIENT)
Dept: FAMILY MEDICINE CLINIC | Facility: CLINIC | Age: 80
End: 2025-06-09
Payer: MEDICARE

## 2025-06-09 DIAGNOSIS — J30.9 ALLERGIC RHINITIS, UNSPECIFIED SEASONALITY, UNSPECIFIED TRIGGER: Primary | ICD-10-CM

## 2025-06-09 PROCEDURE — 95117 IMMUNOTHERAPY INJECTIONS: CPT | Performed by: NURSE PRACTITIONER

## 2025-06-10 ENCOUNTER — CLINICAL SUPPORT (OUTPATIENT)
Dept: FAMILY MEDICINE CLINIC | Facility: CLINIC | Age: 80
End: 2025-06-10
Payer: MEDICARE

## 2025-06-10 DIAGNOSIS — J30.9 ALLERGIC RHINITIS, UNSPECIFIED SEASONALITY, UNSPECIFIED TRIGGER: Primary | ICD-10-CM

## 2025-06-10 PROCEDURE — 95117 IMMUNOTHERAPY INJECTIONS: CPT | Performed by: NURSE PRACTITIONER

## 2025-06-11 ENCOUNTER — CLINICAL SUPPORT (OUTPATIENT)
Dept: FAMILY MEDICINE CLINIC | Facility: CLINIC | Age: 80
End: 2025-06-11
Payer: MEDICARE

## 2025-06-11 DIAGNOSIS — J30.9 ALLERGIC RHINITIS, UNSPECIFIED SEASONALITY, UNSPECIFIED TRIGGER: Primary | ICD-10-CM

## 2025-06-11 PROCEDURE — 95117 IMMUNOTHERAPY INJECTIONS: CPT | Performed by: NURSE PRACTITIONER

## 2025-06-16 ENCOUNTER — CLINICAL SUPPORT (OUTPATIENT)
Dept: FAMILY MEDICINE CLINIC | Facility: CLINIC | Age: 80
End: 2025-06-16
Payer: MEDICARE

## 2025-06-16 DIAGNOSIS — J30.9 ALLERGIC RHINITIS, UNSPECIFIED SEASONALITY, UNSPECIFIED TRIGGER: Primary | ICD-10-CM

## 2025-06-16 PROCEDURE — 95117 IMMUNOTHERAPY INJECTIONS: CPT | Performed by: NURSE PRACTITIONER

## 2025-06-17 ENCOUNTER — TRANSCRIBE ORDERS (OUTPATIENT)
Dept: ADMINISTRATIVE | Facility: HOSPITAL | Age: 80
End: 2025-06-17
Payer: MEDICARE

## 2025-06-17 ENCOUNTER — LAB (OUTPATIENT)
Dept: LAB | Facility: HOSPITAL | Age: 80
End: 2025-06-17
Payer: MEDICARE

## 2025-06-17 ENCOUNTER — CLINICAL SUPPORT (OUTPATIENT)
Dept: FAMILY MEDICINE CLINIC | Facility: CLINIC | Age: 80
End: 2025-06-17
Payer: MEDICARE

## 2025-06-17 DIAGNOSIS — J30.9 ALLERGIC RHINITIS, UNSPECIFIED SEASONALITY, UNSPECIFIED TRIGGER: Primary | ICD-10-CM

## 2025-06-17 DIAGNOSIS — N18.30 STAGE 3 CHRONIC KIDNEY DISEASE, UNSPECIFIED WHETHER STAGE 3A OR 3B CKD: Primary | ICD-10-CM

## 2025-06-17 DIAGNOSIS — I10 ESSENTIAL HYPERTENSION, MALIGNANT: ICD-10-CM

## 2025-06-17 DIAGNOSIS — N18.30 STAGE 3 CHRONIC KIDNEY DISEASE, UNSPECIFIED WHETHER STAGE 3A OR 3B CKD: ICD-10-CM

## 2025-06-17 LAB
25(OH)D3 SERPL-MCNC: 89.4 NG/ML (ref 30–100)
ALBUMIN SERPL-MCNC: 4.3 G/DL (ref 3.5–5.2)
ALBUMIN/GLOB SERPL: 1.8 G/DL
ALP SERPL-CCNC: 92 U/L (ref 39–117)
ALT SERPL W P-5'-P-CCNC: 12 U/L (ref 1–33)
ANION GAP SERPL CALCULATED.3IONS-SCNC: 10.6 MMOL/L (ref 5–15)
AST SERPL-CCNC: 18 U/L (ref 1–32)
BASOPHILS # BLD AUTO: 0.06 10*3/MM3 (ref 0–0.2)
BASOPHILS NFR BLD AUTO: 0.7 % (ref 0–1.5)
BILIRUB SERPL-MCNC: 0.8 MG/DL (ref 0–1.2)
BUN SERPL-MCNC: 17 MG/DL (ref 8–23)
BUN/CREAT SERPL: 15 (ref 7–25)
CALCIUM SPEC-SCNC: 10.1 MG/DL (ref 8.6–10.5)
CHLORIDE SERPL-SCNC: 103 MMOL/L (ref 98–107)
CO2 SERPL-SCNC: 24.4 MMOL/L (ref 22–29)
CREAT SERPL-MCNC: 1.13 MG/DL (ref 0.57–1)
DEPRECATED RDW RBC AUTO: 43.7 FL (ref 37–54)
EGFRCR SERPLBLD CKD-EPI 2021: 49.9 ML/MIN/1.73
EOSINOPHIL # BLD AUTO: 0.14 10*3/MM3 (ref 0–0.4)
EOSINOPHIL NFR BLD AUTO: 1.7 % (ref 0.3–6.2)
ERYTHROCYTE [DISTWIDTH] IN BLOOD BY AUTOMATED COUNT: 12.2 % (ref 12.3–15.4)
GLOBULIN UR ELPH-MCNC: 2.4 GM/DL
GLUCOSE SERPL-MCNC: 95 MG/DL (ref 65–99)
HCT VFR BLD AUTO: 41.3 % (ref 34–46.6)
HGB BLD-MCNC: 13 G/DL (ref 12–15.9)
IMM GRANULOCYTES # BLD AUTO: 0.03 10*3/MM3 (ref 0–0.05)
IMM GRANULOCYTES NFR BLD AUTO: 0.4 % (ref 0–0.5)
LYMPHOCYTES # BLD AUTO: 2.11 10*3/MM3 (ref 0.7–3.1)
LYMPHOCYTES NFR BLD AUTO: 25.7 % (ref 19.6–45.3)
MCH RBC QN AUTO: 30.4 PG (ref 26.6–33)
MCHC RBC AUTO-ENTMCNC: 31.5 G/DL (ref 31.5–35.7)
MCV RBC AUTO: 96.7 FL (ref 79–97)
MONOCYTES # BLD AUTO: 0.79 10*3/MM3 (ref 0.1–0.9)
MONOCYTES NFR BLD AUTO: 9.6 % (ref 5–12)
NEUTROPHILS NFR BLD AUTO: 5.09 10*3/MM3 (ref 1.7–7)
NEUTROPHILS NFR BLD AUTO: 61.9 % (ref 42.7–76)
NRBC BLD AUTO-RTO: 0 /100 WBC (ref 0–0.2)
PLATELET # BLD AUTO: 311 10*3/MM3 (ref 140–450)
PMV BLD AUTO: 11.5 FL (ref 6–12)
POTASSIUM SERPL-SCNC: 4.7 MMOL/L (ref 3.5–5.2)
PROT SERPL-MCNC: 6.7 G/DL (ref 6–8.5)
RBC # BLD AUTO: 4.27 10*6/MM3 (ref 3.77–5.28)
SODIUM SERPL-SCNC: 138 MMOL/L (ref 136–145)
WBC NRBC COR # BLD AUTO: 8.22 10*3/MM3 (ref 3.4–10.8)

## 2025-06-17 PROCEDURE — 85025 COMPLETE CBC W/AUTO DIFF WBC: CPT

## 2025-06-17 PROCEDURE — 36415 COLL VENOUS BLD VENIPUNCTURE: CPT

## 2025-06-17 PROCEDURE — 95117 IMMUNOTHERAPY INJECTIONS: CPT | Performed by: FAMILY MEDICINE

## 2025-06-17 PROCEDURE — 80053 COMPREHEN METABOLIC PANEL: CPT

## 2025-06-17 PROCEDURE — 82306 VITAMIN D 25 HYDROXY: CPT

## 2025-06-19 ENCOUNTER — CLINICAL SUPPORT (OUTPATIENT)
Dept: FAMILY MEDICINE CLINIC | Facility: CLINIC | Age: 80
End: 2025-06-19
Payer: MEDICARE

## 2025-06-19 DIAGNOSIS — J30.9 ALLERGIC RHINITIS, UNSPECIFIED SEASONALITY, UNSPECIFIED TRIGGER: Primary | ICD-10-CM

## 2025-06-19 PROCEDURE — 95117 IMMUNOTHERAPY INJECTIONS: CPT | Performed by: NURSE PRACTITIONER

## 2025-06-23 ENCOUNTER — HOSPITAL ENCOUNTER (OUTPATIENT)
Facility: HOSPITAL | Age: 80
Setting detail: THERAPIES SERIES
Discharge: HOME | End: 2025-06-23
Payer: MEDICARE

## 2025-06-23 DIAGNOSIS — C50.411 MALIGNANT NEOPLASM OF UPPER-OUTER QUADRANT OF RIGHT BREAST IN FEMALE, ESTROGEN RECEPTOR POSITIVE: ICD-10-CM

## 2025-06-23 DIAGNOSIS — Z90.10 DEFORMITY DUE TO MASTECTOMY: ICD-10-CM

## 2025-06-23 DIAGNOSIS — Z17.0 MALIGNANT NEOPLASM OF UPPER-OUTER QUADRANT OF RIGHT BREAST IN FEMALE, ESTROGEN RECEPTOR POSITIVE: ICD-10-CM

## 2025-06-23 DIAGNOSIS — Z91.89 AT RISK FOR LYMPHEDEMA: ICD-10-CM

## 2025-06-23 DIAGNOSIS — Z44.9 FITTING AND ADJUSTMENT OF UNSPECIFIED PROSTHETIC DEVICE: ICD-10-CM

## 2025-06-23 DIAGNOSIS — N64.89 DEFORMITY DUE TO MASTECTOMY: ICD-10-CM

## 2025-06-23 DIAGNOSIS — Z90.11 HISTORY OF MASTECTOMY, RIGHT: Primary | ICD-10-CM

## 2025-06-23 PROCEDURE — 97535 SELF CARE MNGMENT TRAINING: CPT | Performed by: OCCUPATIONAL THERAPIST

## 2025-06-23 PROCEDURE — L8000 MASTECTOMY BRA: HCPCS | Performed by: OCCUPATIONAL THERAPIST

## 2025-06-23 PROCEDURE — 93702 BIS XTRACELL FLUID ANALYSIS: CPT | Performed by: OCCUPATIONAL THERAPIST

## 2025-06-23 NOTE — THERAPY RE-EVALUATION
Outpatient Occupational Therapy Lymphedema Re-Evaluation   Larisa     Patient Name: Deanne Pittman  : 1945  MRN: [id number]  Today's Date: 2025      Visit Date: 2025    Patient Active Problem List   Diagnosis    Cardiomyopathy    Chronic venous insufficiency    Hyperlipidemia    Essential (primary) hypertension    Stage III chronic kidney disease    Renal cyst    Malignant neoplasm of right breast in female, estrogen receptor positive    Breast cancer    S/P right mastectomy    Type 2 diabetes mellitus without complication, without long-term current use of insulin        Past Medical History:   Diagnosis Date    Cancer     BREAST    Cardiomyopathy     PT STATES R/T TO PAST MEDS/NO ISSUES PER PATIENT, FOLLOWS W/PCP-B. VESSELS    CKD (chronic kidney disease) stage 3, GFR 30-59 ml/min     FOLLOWS W/ TICO GR    Diabetes mellitus     Hyperlipidemia     Renal cyst     right    S/P right mastectomy 2024        Past Surgical History:   Procedure Laterality Date    BREAST SURGERY Left     lump removed    COLON SURGERY      COLOSTOMY AND REVERSAL    LIVER SURGERY      exploritory surgery due to puncture to liver     MASTECTOMY WITH SENTINEL NODE BIOPSY AND AXILLARY NODE DISSECTION Right 2024    Procedure: RIGHT BREAST MASTECTOMY WITH RIGHT SENTINEL NODE BIOPSY AND AXILLARY NODE DISSECTION;  Surgeon: Cristel Schwartz MD;  Location: [facility] [place] [facility] [place];  Service: General;  Laterality: Right;    SUBTOTAL HYSTERECTOMY           Visit Dx:     ICD-10-CM ICD-9-CM   1. History of mastectomy, right  Z90.11 V45.71   2. Deformity due to mastectomy  N64.89 611.89    Z90.10    3. Fitting and adjustment of unspecified prosthetic device  Z44.9 V52.9   4. At risk for lymphedema  Z91.89 V49.89   5. Malignant neoplasm of upper-outer quadrant of right breast in female, estrogen receptor positive  C50.411 174.4    Z17.0 V86.0        Patient History       Row Name 25 0800             History    Chief  Complaint --  Lymphedema surveillance program  -TD      Brief Description of Current Complaint Deanne is a 79-year-old female with a recent diagnosis of invasive ductal carcinoma grade 1 ER/WV positive of the right breast.  Patient underwent right-sided mastectomy on November 5, 2024 and had 2 lymphnodes at that time.  -TD         Fall Risk Assessment    Any falls in the past year: No  -TD      Does patient have a fear of falling No  -TD         Services    Are you currently receiving Home Health services No  -TD      Do you plan to receive Home Health services in the near future No  -TD         Daily Activities    Primary Language English  -TD      Are you able to read Yes  -TD      Are you able to write Yes  -TD      How does patient learn best? Listening;Reading;Demonstration;Pictures/Video  -TD         Safety    Are you being hurt, hit, or frightened by anyone at home or in your life? No  -TD      Are you being neglected by a caregiver No  -TD      Have you had any of the following issues with N/A  -TD                User Key  (r) = Recorded By, (t) = Taken By, (c) = Cosigned By      Initials Name Provider Type    TD Lauren Peacock OT Occupational Therapist                     Lymphedema       Row Name 06/23/25 0800             Subjective Pain    Able to rate subjective pain? yes  -TD      Pre-Treatment Pain Level 0  -TD      Post-Treatment Pain Level 0  -TD         Lymphedema Assessment    Lymphedema Classification RUE:;at risk/stage 0  -TD      Lymphedema Cancer Related Sx right;simple mastectomy;sentinel node biopsy  -TD      Lymphedema Surgery Comments 11/2024  -TD      Lymph Nodes Removed # 2  -TD      Positive Lymph Nodes # 0  -TD      Chemo Received no  -TD      Radiation Therapy Received no  -TD         LLIS - Physical Concerns    The amount of pain associated with my lymphedema is: 0  -TD      The amount of limb heaviness associated with my lymphedema is: 0  -TD      The amount of skin tightness associated  "with my lymphedema is: 0  -TD      The size of my swollen limb(s) seems: 0  -TD      Lymphedema affects the movement of my swollen limb(s): 0  -TD      The strength in my swollen limb(s) is: 0  -TD         LLIS - Psychosocial Concerns    Lymphedema affects my body image (i.e., \"how I think I look\"). 0  -TD      Lymphedema affects my socializing with others. 0  -TD      Lymphedema affects my intimate relations with spouse or partner (rate 0 if not applicable 0  -TD      Lymphedema \"gets me down\" (i.e., depression, frustration, or anger) 0  -TD      I must rely on others for help due to my lymphedema. 0  -TD      I know what to do to manage my lymphedema 3  -TD         LLIS - Functional Concerns    Lymphedema affects my ability to perform self-care activities (i.e. eating, dressing, hygiene) 0  -TD      Lymphedema affects my ability to perform routine home or work-related activities. 0  -TD      Lymphedema affects my performance of preferred leisure activities. 0  -TD      Lymphedema affects proper fit of clothing/shoes 0  -TD      Lymphedema affects my sleep 0  -TD         Posture/Observations    Posture- WNL Posture is WNL  -TD         General ROM    GENERAL ROM COMMENTS BUE are WFL  -TD         MMT (Manual Muscle Testing)    General MMT Comments BUE are WFL  -TD         Skin Changes/Observations    Location/Assessment Upper Quadrant  -TD      Upper Quadrant Conditions right:;normal;intact;clean  -TD      Upper Quadrant Color/Pigment left:;normal  -TD         L-Dex Bioimpedence Screening    L-Dex Measurement Extremity RUE  -TD      L-Dex Patient Position Standing  -TD      L-Dex UE Dominate Side Right  -TD      L-Dex UE At Risk Side Right  -TD      L-Dex UE Pre Surgical Value Yes  -TD      L-Dex UE Score 0.8  -TD      L-Dex UE Baseline Score 5.6  -TD      L-Dex UE Value Change -4.8  -TD      $ L-Dex Charge yes  -TD         Lymphedema Life Impact Scale Totals    A.  Total Q1 - Q17 (Do not include Q18) 3  -TD      B.  " Total number of questions answered (Q1-Q17) 17  -TD      C. Divide A by B 0.18  -TD      D. Multiple C by 25 4.5  -TD                User Key  (r) = Recorded By, (t) = Taken By, (c) = Cosigned By      Initials Name Provider Type    TD Lauren Peacock, OT Occupational Therapist                     OT Ortho       Row Name 06/23/25 1000             Orthotics & Prosthetics Management    Orthosis Location other (see comments)  Breast prosthesis and post mastectomy orthosis  -TD      Additional Documentation Orthosis Location (Row)  -TD                User Key  (r) = Recorded By, (t) = Taken By, (c) = Cosigned By      Initials Name Provider Type    TD Lauren Peacock, OT Occupational Therapist                  OT Mastectomy Care:   Location:        Right chest wall     Type of Prosthetic:  Silicone breast form     Reason for Visit/Customer Goal:  Patient would like to achieve symmetry and balance in appearance to improve orthopedic balance as well as improve psychological impact when engaging in community and social activities.     Reason for Prosthetic: Prevent Deformities     Date of Surgery: 11/2024     Date of Discharge: 11/2024     Wearing Schedule:   As Tolerated     Prosthetic Site Condition:   Intact     Tolerance:      Tolerates Well     Product :  Amol     Product Name and Model Number:   (Issued: 2/18/2025)  400 Xtra light 2sn size: 5 x 1     Garments  Type of Garment Dispensed:          Mast Bra w/o Breast Form     Breast : Amol     Product Name:   1- blossom 34C white  1-blossom 34C nude  2- blossom 34C black              Number of Garments Dispensed:   4       Therapy Education  Education Details: Reviewed signs and symptoms of lymphedema and signs of movement issues.  Fit and size are appropriate with no gapping, pinching, or puckering observed. Pt. states comfort and satisfaction with both bra's selected and with prosthesis. All devices were checked for quality and safety. Pt. was  educated on the benefits, precautions warranty, care and maintenance for her prosthesis and bras. Educational handout was provided in the prosthesis box.  Given: HEP, Symptoms/condition management, Edema management, Pain management  Program: Reinforced  How Provided: Verbal, Demonstration  Provided to: Patient, Other (comment)  Level of Understanding: Teach back education performed, Verbalized, Demonstrated  31375 - OT Self Care/Mgmt Minutes: 25    The patient had a follow up  SOZO measurement which I reviewed today. The score is   WFL, see scanned to EMR. Bioimpedance spectroscopy helps identify the   onset of lymphedema in an arm or leg before patients experience noticeable swelling. Research has   shown that 92% of patients with early detection of lymphedema using L-Dex combined with   intervention do not progress to chronic lymphedema through three years. Additionally, as of March 2023, the NCCN Guidelines® for Survivorship recommend proactive screening for lymphedema using   bioimpedance spectroscopy. Whenever possible, patients are tested for baseline L-Dex score before   cancer treatment begins and then are reassessed during regular follow-up visits using the SOZO device.   Otherwise, this can be started postoperatively and continued during regular follow-up visits. If the   patient’s L-Dex score increases above normal levels, that is a sign that lymphedema is developing and a   referral is made to physical therapy for further evaluation and early compression treatment.   Lymphedema assessment with the SOZO L-Dex score is recommended to be done every 3 months for   the first 3 years and then every 6 months for years 4 and 5 followed by annually afterwards     OT Goals       Row Name 06/23/25 1025          Time Calculation    OT Goal Re-Cert Due Date 07/23/25  -TD               User Key  (r) = Recorded By, (t) = Taken By, (c) = Cosigned By      Initials Name Provider Type    Lauren Adan OT Occupational  Therapist                  1. Post Breast Surgery Care/at risk for Lymphedema  LTG 1: 90 days:  As an indicator of no exacerbation of lymphedema staging, the patient will present with an L-Dex score less than [10] points from preoperative baseline.              STATUS: on going  STG 1a:   30 days: To prevent exacerbation of mixed edema to lymphedema, patient will utilize the 2 postsurgical compression garments daily.                 STATUS: on going  STG 1b: 30 days: Patient will be independent with self-manual lymphatic massage.               STATUS: on going  STG 1c: 30 days:  Patient will be independent with identification of signs and symptoms of lymphedema exasperation per stoplight to recovery education handout.              STATUS: on going  STG 1 d: 30 days: Patient will be independent with HEP to prevent advancement in lymphedema staging.              STATUS: on going  TREATMENT:  Self Care/ADL retraining, Therapeutic Activity, Neuromuscular Re-education, Therapeutic Exercise, Bioimpedence Fluid Analysis, Post-Surgical compression garement [address]79 [address] Zip-ST-High/ Rosanna Camisole Kit 2860K, Orthotic Management and training,  and Manual Therapy.     1. Encounter for Breast Prosthetic and mastectomy bra fitting:  LTG 1:  90 days: To provide balance and symmetry for performance of daily activity, the patient will participate in breast prosthesis and mastectomy bra fitting procedure.              STATUS: on going   STG 1a: 30 days:  Patient will participate in mastectomy bra fit re-evaluation to ensure proper fit for balance and symmetry for improved postural alignment/lymph fluid dynamics to prevent impairment in activities of daily living.              STATUS: on going   STG 1b: 30 days:  Patient will participate in breast prosthesis fit re-evaluation 2 years after initial distribution to ensure proper fit for balance and symmetry for improved postural alignment/lymph fluid dynamics to prevent impairment in  activities of daily living.  STATUS: on going   TREATMENT: Orthotic/Prosthetic Management and Training, [place] Silicone Breast Prosthetic, Mastectomy Bra initial fitting and 3-4 month re-fitting, ADL/Self Care, Therapeutic Activity, Therapeutic Exercise, and Manual Therapy.      OT Assessment/Plan       Row Name 06/23/25 0815          OT Assessment    Functional Limitations Limitations in functional capacity and performance  -TD     Impairments Impaired lymphatic circulation  -TD     Assessment Comments Deanne is a 79-year-old female with a recent diagnosis of invasive ductal carcinoma grade 1 ER/[place] positive of the right breast. Pt will be brought in in two weeks to re evaluate. Patient underwent right-sided mastectomy on November 5, 2024 and had 2 lymphnodes at that time.  Pt l-dex score is WFL at this time. Pt presents with decreased balance and symmetry from breast resection that impacts orthopedic balance and symmetry. Patient will benefit from continued evaluation and of integrity of the silicone breast form as well as the mastectomy bras to ensure proper fit for symmetry and balance of breast prosthesis to reduce orthopedic and lymphatic impairment. The skills of a therapist will be required to safely and effectively implement the following treatment plan to restore maximal level of function. Patient would benefit from continued skilled occupational therapy to prevent increased staging of lymphedema, increased pain, and decreased range of motion.  -TD     OT Diagnosis at risk for lymphedema  -TD     OT Rehab Potential Good  -TD     Patient/caregiver participated in establishment of treatment plan and goals Yes  -TD     Patient would benefit from skilled therapy intervention Yes  -TD        OT Plan    OT Frequency --  see duration  -TD     Predicted Duration of Therapy Intervention (OT) Patient will be seen every 3 months years 1-3, and every 6 months years 4 and 5. Pt will be seen every 2 years for prosthetic  refill and every 3-4 months for bra refill.  -TD     Planned CPT's? OT EVAL LOW COMPLEXITY: 38282;OT RE-EVAL: 29030;OT THER ACT EA 15 MIN: [id number];OT THER PROC EA 15 MIN: [id number];OT SELF CARE/MGMT/TRAIN 15 MIN: 63974;OT MANUAL THERAPY EA 15 MIN: 52762;OT BIS XTRACELL FLUID ANALYSIS: 52874;OT CARE PLAN EA 15 MIN;OT ORTHOTIC MGMT/TRAIN EA 15 MIN: 53634;OT ORTHO/PROSTHET CHECKOUT EA 15 MIN: 13014  -TD     Planned Therapy Interventions (Optional Details) home exercise program;postural re-education;prosthetic fitting/training;patient/family education;orthotic fitting/training;ROM (Range of Motion);strengthening;stretching;manual therapy techniques  -TD     OT Plan Comments continue POC  -TD               User Key  (r) = Recorded By, (t) = Taken By, (c) = Cosigned By      Initials Name Provider Type    TD Lauren Peacock OT Occupational Therapist                              Time Calculation:   Timed Charges  49412 - OT Self Care/Mgmt Minutes: 25  Total Minutes  Timed Charges Total Minutes: 25   Total Minutes: 25     Therapy Charges for Today       Code Description Service Date Service Provider Modifiers Qty    15951517873 HC PT BIS XTRACELL FLUID ANALYSIS 6/23/2025 Lauren Peacock OT  1    66698634359 HC OT SELF CARE/MGMT/TRAIN EA 15 MIN 6/23/2025 Lauren Peacock OT GO 2    44176955520 HC BRA POST/SURG NO/FORM SHERWIN/DWAYNE/LISA/DEE/JAY JAY 6/23/2025 Lauren Peacock OT  4                      Lauren Peacock OT  6/23/2025

## 2025-06-24 ENCOUNTER — CLINICAL SUPPORT (OUTPATIENT)
Dept: FAMILY MEDICINE CLINIC | Facility: CLINIC | Age: 80
End: 2025-06-24
Payer: MEDICARE

## 2025-06-24 DIAGNOSIS — J30.9 ALLERGIC RHINITIS, UNSPECIFIED SEASONALITY, UNSPECIFIED TRIGGER: Primary | ICD-10-CM

## 2025-06-24 PROCEDURE — 36415 COLL VENOUS BLD VENIPUNCTURE: CPT | Performed by: NURSE PRACTITIONER

## 2025-06-26 ENCOUNTER — CLINICAL SUPPORT (OUTPATIENT)
Dept: FAMILY MEDICINE CLINIC | Facility: CLINIC | Age: 80
End: 2025-06-26
Payer: MEDICARE

## 2025-06-26 DIAGNOSIS — J30.9 ALLERGIC RHINITIS, UNSPECIFIED SEASONALITY, UNSPECIFIED TRIGGER: Primary | ICD-10-CM

## 2025-06-26 PROCEDURE — 95117 IMMUNOTHERAPY INJECTIONS: CPT | Performed by: NURSE PRACTITIONER

## 2025-06-30 ENCOUNTER — OFFICE VISIT (OUTPATIENT)
Dept: FAMILY MEDICINE CLINIC | Facility: CLINIC | Age: 80
End: 2025-06-30
Payer: MEDICARE

## 2025-06-30 VITALS
DIASTOLIC BLOOD PRESSURE: 64 MMHG | TEMPERATURE: 98.6 F | BODY MASS INDEX: 24.41 KG/M2 | OXYGEN SATURATION: 98 % | SYSTOLIC BLOOD PRESSURE: 110 MMHG | HEART RATE: 78 BPM | WEIGHT: 125 LBS

## 2025-06-30 DIAGNOSIS — M25.461 PAIN AND SWELLING OF RIGHT KNEE: ICD-10-CM

## 2025-06-30 DIAGNOSIS — J30.9 ALLERGIC RHINITIS, UNSPECIFIED SEASONALITY, UNSPECIFIED TRIGGER: ICD-10-CM

## 2025-06-30 DIAGNOSIS — M25.561 PAIN AND SWELLING OF RIGHT KNEE: ICD-10-CM

## 2025-06-30 DIAGNOSIS — R15.9 INCONTINENCE OF FECES WITH FECAL URGENCY: Primary | ICD-10-CM

## 2025-06-30 DIAGNOSIS — R15.2 INCONTINENCE OF FECES WITH FECAL URGENCY: Primary | ICD-10-CM

## 2025-06-30 PROCEDURE — 95117 IMMUNOTHERAPY INJECTIONS: CPT | Performed by: NURSE PRACTITIONER

## 2025-06-30 PROCEDURE — 99214 OFFICE O/P EST MOD 30 MIN: CPT | Performed by: NURSE PRACTITIONER

## 2025-06-30 PROCEDURE — 1159F MED LIST DOCD IN RCRD: CPT | Performed by: NURSE PRACTITIONER

## 2025-06-30 PROCEDURE — 3078F DIAST BP <80 MM HG: CPT | Performed by: NURSE PRACTITIONER

## 2025-06-30 PROCEDURE — 1126F AMNT PAIN NOTED NONE PRSNT: CPT | Performed by: NURSE PRACTITIONER

## 2025-06-30 PROCEDURE — 1160F RVW MEDS BY RX/DR IN RCRD: CPT | Performed by: NURSE PRACTITIONER

## 2025-06-30 PROCEDURE — 3074F SYST BP LT 130 MM HG: CPT | Performed by: NURSE PRACTITIONER

## 2025-06-30 RX ORDER — CHOLESTYRAMINE 4 G/9G
1 POWDER, FOR SUSPENSION ORAL DAILY PRN
Qty: 30 EACH | Refills: 0 | Status: SHIPPED | OUTPATIENT
Start: 2025-06-30

## 2025-06-30 NOTE — PROGRESS NOTES
Chief Complaint  Diarrhea    History of Present Illness  Deanne Pittman is a 79 y.o. female who presents to [facility] with a past medical history of    Past Medical History:   Diagnosis Date    Cancer     BREAST    Cardiomyopathy     PT STATES R/T TO PAST MEDS/NO ISSUES PER PATIENT, FOLLOWS W/PCP-B. VESSELS    CKD (chronic kidney disease) stage 3, GFR 30-59 ml/min     FOLLOWS EMERY GR    Diabetes mellitus     Hyperlipidemia     Renal cyst     right    S/P right mastectomy 11/06/2024       History of Present Illness  The patient is a 79-year-old female who presents to the office today with concerns about diarrhea and right knee pain.    She reports experiencing diarrhea, which she believes is triggered by sudden changes in temperature, such as transitioning from a hot outdoor environment to an air-conditioned indoor setting. She has been managing this condition since 1995 and has not undergone a colonoscopy during this period. She has attempted to manage her symptoms with Imodium but found it ineffective once the diarrhea started. She has considered the possibility that her symptoms may be related to her diet or the anastrozole medication. She still has her gallbladder. She had diverticulitis in 1995 and ended up with a colostomy.    She recently twisted her right knee while at home, resulting in persistent pain and swelling. She is unsure if the injury was due to a fall or if her knee gave way. She describes a sensation of a bone popping in her knee. The pain is more pronounced at night and radiates down her leg and up the side of her knee. She has been using ice packs during the day and a heat pad at night for relief. She has been using a knee brace for support and has been careful to avoid further injury. Despite the pain, she continues to be active and does not elevate her leg.    She is currently receiving allergy injections and has an EpiPen for emergency use,  although she has not needed it recently. She is also taking calcium supplements, with a current dosage of 600 mg daily.    PAST SURGICAL HISTORY:  Colostomy in 1995 due to diverticulitis.      Objective   Vital Signs:   Vitals:    06/30/25 1410   BP: 110/64   Pulse: 78   Temp: 98.6 °F (37 °C)   SpO2: 98%   Weight: 56.7 kg (125 lb)     Body mass index is 24.41 kg/m².    Wt Readings from Last 3 Encounters:   06/30/25 56.7 kg (125 lb)   04/22/25 57.2 kg (126 lb 3.2 oz)   03/31/25 56.7 kg (125 lb)     BP Readings from Last 3 Encounters:   06/30/25 110/64   04/22/25 109/79   03/31/25 116/74       Health Maintenance   Topic Date Due    DIABETIC FOOT EXAM  04/14/2023    COVID-19 Vaccine (5 - 2024-25 season) 09/01/2024    DIABETIC EYE EXAM  03/22/2025    DXA SCAN  07/07/2025    HEMOGLOBIN A1C  07/24/2025    ANNUAL WELLNESS VISIT  07/25/2025    RSV Vaccine - Adults (1 - 1-dose 75+ series) 01/24/2026 (Originally 9/28/2021)    INFLUENZA VACCINE  07/01/2025    LIPID PANEL  01/24/2026    URINE MICROALBUMIN-CREATININE RATIO (uACR)  01/24/2026    TDAP/TD VACCINES (2 - Td or Tdap) 01/18/2029    HEPATITIS C SCREENING  Completed    Pneumococcal Vaccine 50+  Completed    ZOSTER VACCINE  Completed    MAMMOGRAM  Discontinued    LUNG CANCER SCREENING  Discontinued       Physical Exam  Vitals reviewed.   Constitutional:       General: She is not in acute distress.     Appearance: Normal appearance. She is well-developed and normal weight. She is not ill-appearing.   HENT:      Head: Normocephalic and atraumatic.   Eyes:      General: No scleral icterus.        Right eye: No discharge.         Left eye: No discharge.      Extraocular Movements: Extraocular movements intact.      Conjunctiva/sclera: Conjunctivae normal.   Cardiovascular:      Rate and Rhythm: Normal rate and regular rhythm.      Pulses: Normal pulses.      Heart sounds: No murmur heard.  Pulmonary:      Effort: Pulmonary effort is normal.      Breath sounds: Normal breath  sounds. No wheezing, rhonchi or rales.   Abdominal:      General: Bowel sounds are normal. There is no distension.      Palpations: Abdomen is soft. There is no mass.      Tenderness: There is no abdominal tenderness.   Musculoskeletal:         General: Normal range of motion.      Cervical back: Normal range of motion.      Right knee: Swelling present. No deformity, effusion, erythema, ecchymosis, lacerations, bony tenderness or crepitus. Normal range of motion. Tenderness present.      Right lower leg: No edema.      Left lower leg: No edema.   Skin:     General: Skin is warm and dry.   Neurological:      Mental Status: She is alert and oriented to person, place, and time.   Psychiatric:         Mood and Affect: Mood and affect normal.         Behavior: Behavior normal.         Thought Content: Thought content normal.         Judgment: Judgment normal.         Result Review :  The following data was reviewed by: BETSY Rocha on 06/30/2025:    Lab on 06/17/2025   Component Date Value    Glucose 06/17/2025 95     BUN 06/17/2025 17.0     Creatinine 06/17/2025 1.13 (H)     Sodium 06/17/2025 138     Potassium 06/17/2025 4.7     Chloride 06/17/2025 103     CO2 06/17/2025 24.4     Calcium 06/17/2025 10.1     Total Protein 06/17/2025 6.7     Albumin 06/17/2025 4.3     ALT (SGPT) 06/17/2025 12     AST (SGOT) 06/17/2025 18     Alkaline Phosphatase 06/17/2025 92     Total Bilirubin 06/17/2025 0.8     Globulin 06/17/2025 2.4     A/G Ratio 06/17/2025 1.8     BUN/Creatinine Ratio 06/17/2025 15.0     Anion Gap 06/17/2025 10.6     eGFR 06/17/2025 49.9 (L)     25 Hydroxy, Vitamin D 06/17/2025 89.4     WBC 06/17/2025 8.22     RBC 06/17/2025 4.27     Hemoglobin 06/17/2025 13.0     Hematocrit 06/17/2025 41.3     MCV 06/17/2025 96.7     MCH 06/17/2025 30.4     MCHC 06/17/2025 31.5     RDW 06/17/2025 12.2 (L)     RDW-SD 06/17/2025 43.7     MPV 06/17/2025 11.5     Platelets 06/17/2025 311     Neutrophil % 06/17/2025 61.9      Lymphocyte % 06/17/2025 25.7     Monocyte % 06/17/2025 9.6     Eosinophil % 06/17/2025 1.7     Basophil % 06/17/2025 0.7     Immature Grans % 06/17/2025 0.4     Neutrophils, Absolute 06/17/2025 5.09     Lymphocytes, Absolute 06/17/2025 2.11     Monocytes, Absolute 06/17/2025 0.79     Eosinophils, Absolute 06/17/2025 0.14     Basophils, Absolute 06/17/2025 0.06     Immature Grans, Absolute 06/17/2025 0.03     nRBC 06/17/2025 0.0      XR Knee 3 View Right (06/30/2025 14:57)       Procedures        Assessment and Plan   Diagnoses and all orders for this visit:    1. Incontinence of feces with fecal urgency (Primary)  -     cholestyramine (Questran) 4 g packet; Take 1 packet by mouth Daily As Needed (loose stools/diarrhea. Take 1 hour AFTER RX medications, OR take RX medications 4-6 hours AFTER cholestyramine).  Dispense: 30 each; Refill: 0    2. Pain and swelling of right knee  -     XR Knee 3 View Right    3. Allergic rhinitis, unspecified seasonality, unspecified trigger  -     patient supplied allergy injection  -     patient supplied allergy injection        Assessment & Plan  1. Diarrhea.  - Symptoms suggest a potential change in the colon, which is a cause for concern.  - Recommended further evaluation with colonoscopy, but she declines.   - Trial of Questran powder will be initiated, to be taken once daily as needed.  - Instructions provided to take other medications either 1 hour prior to or 4 to 6 hours after Questran.  - Effectiveness of treatment to be evaluated during the next visit.    2. Right knee pain.  - An x-ray of the right knee will be ordered to assess for any fluid accumulation.  - Advised to elevate and ice the affected area.  - If fluid is detected, a referral to orthopedics may be necessary for further evaluation and potential drainage.  - Patient reports swelling and tenderness, with pain more pronounced at night.    Follow-up  The patient will follow up in 1 month.      BMI is within  normal parameters. No other follow-up for BMI required.         FOLLOW UP  Return in about 31 days (around 7/31/2025) for Medicare Wellness, medication refills and fasting labs.    Patient was given instructions and counseling regarding her condition or for health maintenance advice. Please see specific information pulled into the AVS if appropriate.       BETSY Rocha  06/30/25  15:43 EDT    CURRENT & DISCONTINUED MEDICATIONS  Current Outpatient Medications   Medication Instructions    anastrozole (ARIMIDEX) 1 mg, Oral, Daily    atorvastatin (LIPITOR) 20 mg, Oral, Every Night at Bedtime    CALCIUM  mg, Oral, Daily    carvedilol (COREG) 6.25 MG tablet 1 tablet, 2 times daily    cholestyramine (Questran) 4 g packet 1 packet, Oral, Daily PRN    cyanocobalamin (VITAMIN B-12) 1,000 mcg    EPINEPHrine (EPIPEN) 0.3 MG/0.3ML solution auto-injector injection USE AS DIRECTED for severe allergic reaction    fluticasone (FLONASE) 50 MCG/ACT nasal spray 2 sprays, Nasal, Daily    losartan (COZAAR) 25 MG tablet 1 tablet, Daily    metFORMIN (GLUCOPHAGE) 500 mg, Oral, 2 Times Daily With Meals    vitamin D (ERGOCALCIFEROL) 50,000 Units, Oral, Every 7 Days       Medications Discontinued During This Encounter   Medication Reason    Omega-3 Fatty Acids (OMEGA 3 500 PO) *Therapy completed    ipratropium (ATROVENT) 0.06 % nasal spray *Therapy completed    Loratadine (CLARITIN PO) *Therapy completed        Patient or patient representative verbalized consent for the use of Ambient Listening during the visit with  BETSY Rocha for chart documentation. 6/30/2025  14:46 EDT

## 2025-07-02 ENCOUNTER — CLINICAL SUPPORT (OUTPATIENT)
Dept: FAMILY MEDICINE CLINIC | Facility: CLINIC | Age: 80
End: 2025-07-02
Payer: MEDICARE

## 2025-07-02 DIAGNOSIS — J30.9 ALLERGIC RHINITIS, UNSPECIFIED SEASONALITY, UNSPECIFIED TRIGGER: Primary | ICD-10-CM

## 2025-07-02 PROCEDURE — 95117 IMMUNOTHERAPY INJECTIONS: CPT | Performed by: NURSE PRACTITIONER

## 2025-07-07 ENCOUNTER — CLINICAL SUPPORT (OUTPATIENT)
Dept: FAMILY MEDICINE CLINIC | Facility: CLINIC | Age: 80
End: 2025-07-07
Payer: MEDICARE

## 2025-07-07 DIAGNOSIS — J30.9 ALLERGIC RHINITIS, UNSPECIFIED SEASONALITY, UNSPECIFIED TRIGGER: Primary | ICD-10-CM

## 2025-07-07 PROCEDURE — 95117 IMMUNOTHERAPY INJECTIONS: CPT | Performed by: NURSE PRACTITIONER

## 2025-07-08 ENCOUNTER — CLINICAL SUPPORT (OUTPATIENT)
Dept: FAMILY MEDICINE CLINIC | Facility: CLINIC | Age: 80
End: 2025-07-08
Payer: MEDICARE

## 2025-07-08 DIAGNOSIS — J30.9 ALLERGIC RHINITIS, UNSPECIFIED SEASONALITY, UNSPECIFIED TRIGGER: Primary | ICD-10-CM

## 2025-07-08 PROCEDURE — 95117 IMMUNOTHERAPY INJECTIONS: CPT | Performed by: NURSE PRACTITIONER

## 2025-07-10 ENCOUNTER — CLINICAL SUPPORT (OUTPATIENT)
Dept: FAMILY MEDICINE CLINIC | Facility: CLINIC | Age: 80
End: 2025-07-10
Payer: MEDICARE

## 2025-07-10 DIAGNOSIS — J30.9 ALLERGIC RHINITIS, UNSPECIFIED SEASONALITY, UNSPECIFIED TRIGGER: Primary | ICD-10-CM

## 2025-07-10 PROCEDURE — 95117 IMMUNOTHERAPY INJECTIONS: CPT | Performed by: NURSE PRACTITIONER

## 2025-07-14 ENCOUNTER — CLINICAL SUPPORT (OUTPATIENT)
Dept: FAMILY MEDICINE CLINIC | Facility: CLINIC | Age: 80
End: 2025-07-14
Payer: MEDICARE

## 2025-07-14 DIAGNOSIS — J30.9 ALLERGIC RHINITIS, UNSPECIFIED SEASONALITY, UNSPECIFIED TRIGGER: Primary | ICD-10-CM

## 2025-07-14 PROCEDURE — 95117 IMMUNOTHERAPY INJECTIONS: CPT | Performed by: NURSE PRACTITIONER

## 2025-07-16 ENCOUNTER — CLINICAL SUPPORT (OUTPATIENT)
Dept: FAMILY MEDICINE CLINIC | Facility: CLINIC | Age: 80
End: 2025-07-16
Payer: MEDICARE

## 2025-07-16 DIAGNOSIS — J30.9 ALLERGIC RHINITIS, UNSPECIFIED SEASONALITY, UNSPECIFIED TRIGGER: Primary | ICD-10-CM

## 2025-07-16 PROCEDURE — 95117 IMMUNOTHERAPY INJECTIONS: CPT | Performed by: NURSE PRACTITIONER

## 2025-07-21 ENCOUNTER — CLINICAL SUPPORT (OUTPATIENT)
Dept: FAMILY MEDICINE CLINIC | Facility: CLINIC | Age: 80
End: 2025-07-21
Payer: MEDICARE

## 2025-07-21 DIAGNOSIS — J30.9 ALLERGIC RHINITIS, UNSPECIFIED SEASONALITY, UNSPECIFIED TRIGGER: Primary | ICD-10-CM

## 2025-07-21 PROCEDURE — 95117 IMMUNOTHERAPY INJECTIONS: CPT | Performed by: NURSE PRACTITIONER

## 2025-07-23 ENCOUNTER — CLINICAL SUPPORT (OUTPATIENT)
Dept: FAMILY MEDICINE CLINIC | Facility: CLINIC | Age: 80
End: 2025-07-23
Payer: MEDICARE

## 2025-07-23 DIAGNOSIS — J30.9 ALLERGIC RHINITIS, UNSPECIFIED SEASONALITY, UNSPECIFIED TRIGGER: Primary | ICD-10-CM

## 2025-07-23 PROCEDURE — 95117 IMMUNOTHERAPY INJECTIONS: CPT | Performed by: NURSE PRACTITIONER

## 2025-07-28 ENCOUNTER — CLINICAL SUPPORT (OUTPATIENT)
Dept: FAMILY MEDICINE CLINIC | Facility: CLINIC | Age: 80
End: 2025-07-28
Payer: MEDICARE

## 2025-07-28 ENCOUNTER — LAB (OUTPATIENT)
Dept: LAB | Facility: HOSPITAL | Age: 80
End: 2025-07-28
Payer: MEDICARE

## 2025-07-28 ENCOUNTER — HOSPITAL ENCOUNTER (OUTPATIENT)
Dept: MAMMOGRAPHY | Facility: HOSPITAL | Age: 80
Discharge: HOME | End: 2025-07-28
Payer: MEDICARE

## 2025-07-28 DIAGNOSIS — E11.22 CONTROLLED TYPE 2 DIABETES MELLITUS WITH STAGE 3 CHRONIC KIDNEY DISEASE, WITHOUT LONG-TERM CURRENT USE OF INSULIN: ICD-10-CM

## 2025-07-28 DIAGNOSIS — I10 ESSENTIAL (PRIMARY) HYPERTENSION: ICD-10-CM

## 2025-07-28 DIAGNOSIS — J30.9 ALLERGIC RHINITIS, UNSPECIFIED SEASONALITY, UNSPECIFIED TRIGGER: Primary | ICD-10-CM

## 2025-07-28 DIAGNOSIS — Z12.31 ENCOUNTER FOR SCREENING MAMMOGRAM FOR BREAST CANCER: ICD-10-CM

## 2025-07-28 DIAGNOSIS — E55.9 VITAMIN D DEFICIENCY: ICD-10-CM

## 2025-07-28 DIAGNOSIS — C50.919 INVASIVE CARCINOMA OF BREAST: ICD-10-CM

## 2025-07-28 DIAGNOSIS — E78.2 MIXED HYPERLIPIDEMIA: ICD-10-CM

## 2025-07-28 DIAGNOSIS — N18.30 CONTROLLED TYPE 2 DIABETES MELLITUS WITH STAGE 3 CHRONIC KIDNEY DISEASE, WITHOUT LONG-TERM CURRENT USE OF INSULIN: ICD-10-CM

## 2025-07-28 LAB
25(OH)D3 SERPL-MCNC: 63.4 NG/ML (ref 30–100)
ALBUMIN SERPL-MCNC: 4.6 G/DL (ref 3.5–5.2)
ALBUMIN UR-MCNC: <1.2 MG/DL
ALBUMIN/GLOB SERPL: 2.1 G/DL
ALP SERPL-CCNC: 89 U/L (ref 39–117)
ALT SERPL W P-5'-P-CCNC: 15 U/L (ref 1–33)
ANION GAP SERPL CALCULATED.3IONS-SCNC: 10.7 MMOL/L (ref 5–15)
AST SERPL-CCNC: 16 U/L (ref 1–32)
BASOPHILS # BLD AUTO: 0.06 10*3/MM3 (ref 0–0.2)
BASOPHILS NFR BLD AUTO: 0.8 % (ref 0–1.5)
BILIRUB SERPL-MCNC: 1 MG/DL (ref 0–1.2)
BUN SERPL-MCNC: 20.1 MG/DL (ref 8–23)
BUN/CREAT SERPL: 19.9 (ref 7–25)
CALCIUM SPEC-SCNC: 9.8 MG/DL (ref 8.6–10.5)
CHLORIDE SERPL-SCNC: 101 MMOL/L (ref 98–107)
CHOLEST SERPL-MCNC: 142 MG/DL (ref 0–200)
CO2 SERPL-SCNC: 25.3 MMOL/L (ref 22–29)
CREAT SERPL-MCNC: 1.01 MG/DL (ref 0.57–1)
CREAT UR-MCNC: 107.6 MG/DL
DEPRECATED RDW RBC AUTO: 48.1 FL (ref 37–54)
EGFRCR SERPLBLD CKD-EPI 2021: 57.1 ML/MIN/1.73
EOSINOPHIL # BLD AUTO: 0.13 10*3/MM3 (ref 0–0.4)
EOSINOPHIL NFR BLD AUTO: 1.7 % (ref 0.3–6.2)
ERYTHROCYTE [DISTWIDTH] IN BLOOD BY AUTOMATED COUNT: 13.4 % (ref 12.3–15.4)
GLOBULIN UR ELPH-MCNC: 2.2 GM/DL
GLUCOSE SERPL-MCNC: 97 MG/DL (ref 65–99)
HBA1C MFR BLD: 5.8 % (ref 4.8–5.6)
HCT VFR BLD AUTO: 41 % (ref 34–46.6)
HDLC SERPL-MCNC: 67 MG/DL (ref 40–60)
HGB BLD-MCNC: 13.1 G/DL (ref 12–15.9)
IMM GRANULOCYTES # BLD AUTO: 0.04 10*3/MM3 (ref 0–0.05)
IMM GRANULOCYTES NFR BLD AUTO: 0.5 % (ref 0–0.5)
LDLC SERPL CALC-MCNC: 53 MG/DL (ref 0–100)
LDLC/HDLC SERPL: 0.74 {RATIO}
LYMPHOCYTES # BLD AUTO: 1.97 10*3/MM3 (ref 0.7–3.1)
LYMPHOCYTES NFR BLD AUTO: 26.1 % (ref 19.6–45.3)
MCH RBC QN AUTO: 30.9 PG (ref 26.6–33)
MCHC RBC AUTO-ENTMCNC: 32 G/DL (ref 31.5–35.7)
MCV RBC AUTO: 96.7 FL (ref 79–97)
MICROALBUMIN/CREAT UR: NORMAL MG/G{CREAT}
MONOCYTES # BLD AUTO: 0.65 10*3/MM3 (ref 0.1–0.9)
MONOCYTES NFR BLD AUTO: 8.6 % (ref 5–12)
NEUTROPHILS NFR BLD AUTO: 4.71 10*3/MM3 (ref 1.7–7)
NEUTROPHILS NFR BLD AUTO: 62.3 % (ref 42.7–76)
NRBC BLD AUTO-RTO: 0 /100 WBC (ref 0–0.2)
PLATELET # BLD AUTO: 329 10*3/MM3 (ref 140–450)
PMV BLD AUTO: 11.4 FL (ref 6–12)
POTASSIUM SERPL-SCNC: 4.6 MMOL/L (ref 3.5–5.2)
PROT SERPL-MCNC: 6.8 G/DL (ref 6–8.5)
RBC # BLD AUTO: 4.24 10*6/MM3 (ref 3.77–5.28)
SODIUM SERPL-SCNC: 137 MMOL/L (ref 136–145)
T4 FREE SERPL-MCNC: 1.22 NG/DL (ref 0.92–1.68)
TRIGL SERPL-MCNC: 127 MG/DL (ref 0–150)
TSH SERPL DL<=0.05 MIU/L-ACNC: 1.95 UIU/ML (ref 0.27–4.2)
VLDLC SERPL-MCNC: 22 MG/DL (ref 5–40)
WBC NRBC COR # BLD AUTO: 7.56 10*3/MM3 (ref 3.4–10.8)

## 2025-07-28 PROCEDURE — 95117 IMMUNOTHERAPY INJECTIONS: CPT | Performed by: NURSE PRACTITIONER

## 2025-07-28 PROCEDURE — 82570 ASSAY OF URINE CREATININE: CPT

## 2025-07-28 PROCEDURE — 85025 COMPLETE CBC W/AUTO DIFF WBC: CPT

## 2025-07-28 PROCEDURE — 80061 LIPID PANEL: CPT

## 2025-07-28 PROCEDURE — 84443 ASSAY THYROID STIM HORMONE: CPT

## 2025-07-28 PROCEDURE — 82043 UR ALBUMIN QUANTITATIVE: CPT

## 2025-07-28 PROCEDURE — 36415 COLL VENOUS BLD VENIPUNCTURE: CPT

## 2025-07-28 PROCEDURE — 82306 VITAMIN D 25 HYDROXY: CPT

## 2025-07-28 PROCEDURE — 83036 HEMOGLOBIN GLYCOSYLATED A1C: CPT

## 2025-07-28 PROCEDURE — 84439 ASSAY OF FREE THYROXINE: CPT

## 2025-07-28 PROCEDURE — 77067 SCR MAMMO BI INCL CAD: CPT

## 2025-07-28 PROCEDURE — 77063 BREAST TOMOSYNTHESIS BI: CPT

## 2025-07-28 PROCEDURE — 80053 COMPREHEN METABOLIC PANEL: CPT

## 2025-07-29 ENCOUNTER — RESULTS FOLLOW-UP (OUTPATIENT)
Dept: FAMILY MEDICINE CLINIC | Facility: CLINIC | Age: 80
End: 2025-07-29
Payer: MEDICARE

## 2025-07-29 NOTE — LETTER
Deanne Pittman  [address] [place] [address]    July 29, 2025     Dear MsEnzo Zain:    Lipid profile is excellent!  Your lipid profile has been excellent for the past 2 years so I feel this is something that we can start to check annually.     A1c is currently 5.8%.  This is increased slightly from 6 months ago when it was 5.4%, but it is overall down from a year ago when it was 6.1%.     Thyroid profile is normal.     Urine microalbumin is normal.  Urine creatinine is slightly elevated at 107, but stable when compared to the past year.  We would like to see this less than 30.  Continue to hydrate well with water.  Chronic kidney disease appears stable on your CMP.     Vitamin D is normal.  CBC also normal.    Resulted Orders   Hemoglobin A1c   Result Value Ref Range    Hemoglobin A1C 5.80 (H) 4.80 - 5.60 %   Lipid Panel   Result Value Ref Range    Total Cholesterol 142 0 - 200 mg/dL    Triglycerides 127 0 - 150 mg/dL    HDL Cholesterol 67 (H) 40 - 60 mg/dL    LDL Cholesterol  53 0 - 100 mg/dL    VLDL Cholesterol 22 5 - 40 mg/dL    LDL/HDL Ratio 0.74    TSH+Free T4   Result Value Ref Range    TSH 1.950 0.270 - 4.200 uIU/mL    Free T4 1.22 0.92 - 1.68 ng/dL   Microalbumin / Creatinine Urine Ratio - Urine, Clean Catch   Result Value Ref Range    Microalbumin/Creatinine Ratio        Comment:      Unable to calculate    Creatinine, Urine 107.6 mg/dL    Microalbumin, Urine <1.2 mg/dL     Sincerely,  Huyen Damon, APRN

## 2025-07-31 ENCOUNTER — OFFICE VISIT (OUTPATIENT)
Dept: FAMILY MEDICINE CLINIC | Facility: CLINIC | Age: 80
End: 2025-07-31
Payer: MEDICARE

## 2025-07-31 VITALS
HEIGHT: 60 IN | BODY MASS INDEX: 24.54 KG/M2 | WEIGHT: 125 LBS | TEMPERATURE: 98.2 F | HEART RATE: 71 BPM | SYSTOLIC BLOOD PRESSURE: 112 MMHG | DIASTOLIC BLOOD PRESSURE: 60 MMHG | OXYGEN SATURATION: 98 %

## 2025-07-31 DIAGNOSIS — E78.2 MIXED HYPERLIPIDEMIA: ICD-10-CM

## 2025-07-31 DIAGNOSIS — Z00.00 MEDICARE ANNUAL WELLNESS VISIT, SUBSEQUENT: Primary | ICD-10-CM

## 2025-07-31 DIAGNOSIS — Z78.0 POSTMENOPAUSAL: ICD-10-CM

## 2025-07-31 DIAGNOSIS — J30.89 PERENNIAL ALLERGIC RHINITIS: ICD-10-CM

## 2025-07-31 DIAGNOSIS — J30.9 ALLERGIC RHINITIS, UNSPECIFIED SEASONALITY, UNSPECIFIED TRIGGER: ICD-10-CM

## 2025-07-31 DIAGNOSIS — I10 ESSENTIAL (PRIMARY) HYPERTENSION: ICD-10-CM

## 2025-07-31 DIAGNOSIS — E11.22 CONTROLLED TYPE 2 DIABETES MELLITUS WITH STAGE 3 CHRONIC KIDNEY DISEASE, WITHOUT LONG-TERM CURRENT USE OF INSULIN: ICD-10-CM

## 2025-07-31 DIAGNOSIS — E11.9 ENCOUNTER FOR DIABETIC FOOT EXAM: ICD-10-CM

## 2025-07-31 DIAGNOSIS — Z12.2 SCREENING FOR LUNG CANCER: ICD-10-CM

## 2025-07-31 DIAGNOSIS — N18.30 CONTROLLED TYPE 2 DIABETES MELLITUS WITH STAGE 3 CHRONIC KIDNEY DISEASE, WITHOUT LONG-TERM CURRENT USE OF INSULIN: ICD-10-CM

## 2025-07-31 DIAGNOSIS — Z87.891 PERSONAL HISTORY OF NICOTINE DEPENDENCE: ICD-10-CM

## 2025-07-31 RX ORDER — FLUTICASONE PROPIONATE 50 MCG
SPRAY, SUSPENSION (ML) NASAL
Qty: 48 G | Refills: 1 | Status: SHIPPED | OUTPATIENT
Start: 2025-07-31

## 2025-07-31 RX ORDER — CHOLECALCIFEROL (VITAMIN D3) 25 MCG
1000 TABLET ORAL DAILY
COMMUNITY

## 2025-07-31 NOTE — PROGRESS NOTES
Subjective   The ABCs of the Annual Wellness Visit  Medicare Wellness Visit      Deanne Pittman is a 79 y.o. patient who presents for a Medicare Wellness Visit.    The following portions of the patient's history were reviewed and updated as appropriate: allergies, current medications, past family history, past medical history, past social history, past surgical history, and problem list.    Compared to one year ago, the patient's physical health is the same.    Compared to one year ago, the patient's mental health is the same.    Recent Hospitalizations:  She was not admitted to the hospital during the last year.     Current Medical Providers:  Patient Care Team:  Huyen Damon APRN as PCP - General (Nurse Practitioner)  Areli Davis MD as Consulting Physician (Nephrology)  Sony Gutierrez MD as Consulting Physician (Ophthalmology)  Susanna Lozano RN as Nurse Navigator  Lauren Peacock OT as Occupational Therapist (Occupational Therapy)  Darby Cole MD as Consulting Physician (Hematology)  Marguerite Winn, BETSY as Nurse Practitioner (General Surgery)    Outpatient Medications Prior to Visit   Medication Sig Dispense Refill    anastrozole (ARIMIDEX) 1 MG tablet Take 1 tablet by mouth Daily. 90 tablet 1    atorvastatin (LIPITOR) 20 MG tablet TAKE 1 TABLET BY MOUTH AT NIGHT 90 tablet 3    CALCIUM PO Take 600 mg by mouth Daily.      carvedilol (COREG) 6.25 MG tablet Take 1 tablet by mouth 2 (two) times a day.      Cholecalciferol 25 MCG (1000 UT) tablet Take 1 tablet by mouth Daily.      cholestyramine (Questran) 4 g packet Take 1 packet by mouth Daily As Needed (loose stools/diarrhea. Take 1 hour AFTER RX medications, OR take RX medications 4-6 hours AFTER cholestyramine). 30 each 0    cyanocobalamin (VITAMIN B-12) 1000 MCG tablet Take 1 tablet by mouth.      EPINEPHrine (EPIPEN) 0.3 MG/0.3ML solution auto-injector injection USE AS DIRECTED for severe allergic reaction      fluticasone (FLONASE) 50  "MCG/ACT nasal spray 2 sprays into the nostril(s) as directed by provider Daily. 48 g 3    losartan (COZAAR) 25 MG tablet Take 1 tablet by mouth Daily.      metFORMIN (GLUCOPHAGE) 500 MG tablet Take 1 tablet by mouth 2 (Two) Times a Day With Meals. 180 tablet 1    vitamin D (ERGOCALCIFEROL) 1.25 MG (63729 UT) capsule capsule Take 1 capsule by mouth Every 7 (Seven) Days. (Patient not taking: Reported on 7/31/2025) 8 capsule 0     No facility-administered medications prior to visit.     No opioid medication identified on active medication list. I have reviewed chart for other potential  high risk medication/s and harmful drug interactions in the elderly.      Aspirin is not on active medication list.  Aspirin use is not indicated based on review of current medical condition/s. Risk of harm outweighs potential benefits.      Patient Active Problem List   Diagnosis    Cardiomyopathy    Chronic venous insufficiency    Hyperlipidemia    Essential (primary) hypertension    Stage III chronic kidney disease    Renal cyst    Malignant neoplasm of right breast in female, estrogen receptor positive    Breast cancer    S/P right mastectomy    Type 2 diabetes mellitus without complication, without long-term current use of insulin     Advance Care Planning Advance Directive is on file.  ACP discussion was held with the patient during this visit. Patient has an advance directive in EMR which is still valid.             Objective   Vitals:    07/31/25 0756   BP: 112/60   Pulse: 71   Temp: 98.2 °F (36.8 °C)   SpO2: 98%   Weight: 56.7 kg (125 lb)   Height: 152.4 cm (60\")   PainSc: 0-No pain       Estimated body mass index is 24.41 kg/m² as calculated from the following:    Height as of this encounter: 152.4 cm (60\").    Weight as of this encounter: 56.7 kg (125 lb).    BMI is within normal parameters. No other follow-up for BMI required.           Does the patient have evidence of cognitive impairment? No    Lab Results   Component " Value Date    TRIG 127 2025    HDL 67 (H) 2025    LDL 53 2025    VLDL 22 2025    HGBA1C 5.80 (H) 2025                                                                                                Health  Risk Assessment    Smoking Status:  Social History     Tobacco Use   Smoking Status Former    Current packs/day: 0.00    Average packs/day: 0.5 packs/day for 45.0 years (22.5 ttl pk-yrs)    Types: Cigarettes    Start date:     Quit date: 2018    Years since quittin.5    Passive exposure: Past   Smokeless Tobacco Never     Alcohol Consumption:  Social History     Substance and Sexual Activity   Alcohol Use Never       Fall Risk Screen  STEADI Fall Risk Assessment was completed, and patient is at LOW risk for falls.Assessment completed on:2025    Depression Screening   Little interest or pleasure in doing things? Not at all   Feeling down, depressed, or hopeless? Not at all   PHQ-2 Total Score 0      Health Habits and Functional and Cognitive Screenin/31/2025     7:59 AM   Functional & Cognitive Status   Do you have difficulty preparing food and eating? No   Do you have difficulty bathing yourself, getting dressed or grooming yourself? No   Do you have difficulty using the toilet? No   Do you have difficulty moving around from place to place? No   Do you have trouble with steps or getting out of a bed or a chair? No   Current Diet Well Balanced Diet   Dental Exam Up to date   Eye Exam Not up to date   Exercise (times per week) 0 times per week   Current Exercises Include No Regular Exercise   Do you need help using the phone?  No   Are you deaf or do you have serious difficulty hearing?  No   Do you need help to go to places out of walking distance? No   Do you need help shopping? No   Do you need help preparing meals?  No   Do you need help with housework?  No   Do you need help with laundry? No   Do you need help taking your medications? No   Do you need help  managing money? No   Do you ever drive or ride in a car without wearing a seat belt? No   Have you felt unusual fatigue (could be tiredness), stress, anger or loneliness in the last month? No   Who do you live with? Alone   If you need help, do you have trouble finding someone available to you? No   Have you been bothered in the last four weeks by sexual problems? No   Do you have difficulty concentrating, remembering or making decisions? Yes           Age-appropriate Screening Schedule:  Refer to the list below for future screening recommendations based on patient's age, sex and/or medical conditions. Orders for these recommended tests are listed in the plan section. The patient has been provided with a written plan.    Health Maintenance List  Health Maintenance   Topic Date Due    DIABETIC FOOT EXAM  04/14/2023    COVID-19 Vaccine (5 - 2024-25 season) 09/01/2024    DIABETIC EYE EXAM  03/22/2025    DXA SCAN  07/07/2025    RSV Vaccine - Adults (1 - 1-dose 75+ series) 01/24/2026 (Originally 9/28/2021)    INFLUENZA VACCINE  10/01/2025    HEMOGLOBIN A1C  01/28/2026    LIPID PANEL  07/28/2026    URINE MICROALBUMIN-CREATININE RATIO (uACR)  07/28/2026    ANNUAL WELLNESS VISIT  07/31/2026    TDAP/TD VACCINES (2 - Td or Tdap) 01/18/2029    HEPATITIS C SCREENING  Completed    Pneumococcal Vaccine 50+  Completed    ZOSTER VACCINE  Completed    MAMMOGRAM  Discontinued    LUNG CANCER SCREENING  Discontinued                                                                                                                                                CMS Preventative Services Quick Reference  Risk Factors Identified During Encounter    Immunizations Discussed/Encouraged: COVID19    Inactivity/Sedentary: Patient was advised to exercise at least 150 minutes a week per CDC recommendations.    Dental Screening Recommended    Vision Screening Recommended    The above risks/problems have been discussed with the patient.    Pertinent  information has been shared with the patient in the After Visit Summary.    An After Visit Summary and PPPS were made available to the patient.    Follow Up:     Next Medicare Wellness visit to be scheduled in 1 year.          Additional E&M Note during same encounter follows:  Patient has multiple medical problems which are significant and separately identifiable that require additional work above and beyond the Medicare Wellness Visit.      Chief Complaint  Medicare Wellness-subsequent    Deanne Mariya Pittman is a 79 y.o. female who presents to [facility]     History of Present Illness  The patient is a 79-year-old female who presents to the office today for a Medicare annual wellness visit, as well as follow-up on chronic health conditions and medication refills. She has recently had her lab work.    She maintains a stable weight of 125 pounds. She avoids eating after 6:00 PM. She was diagnosed with diabetes and managed it by reducing her intake of sandwiches and potato chips. She is on metformin.  Denies polyuria, polydipsia, or polyphagia.  No GI side effects from metformin.    She has an upcoming appointment with Marguerite Winn in 12/2025 -part of her follow-up for breast cancer since Dr. Schwartz has left the system. She also has an appointment with Dr. Cole tomorrow.     She has an advanced directive on file, which remains unchanged. She underwent a mammogram on 07/29/2025.     Her last eye exam was in 2023, and she plans to have another in 03/2026.     She is currently under the care of Dr. Davis, Dr. Gutierrez, and Susanna Lozano, who is the nurse navigator for her breast cancer treatment.     She has used Questran 3 times since her last visit for intermittent diarrhea.  She truly believes the cause of the diarrhea was secondary to being exposed to extreme heat and then extreme cold such as with transitioning from outdoors to indoors.      She visits nephrology annually, with  "her last visit in 06/2025.    Objective   Vital Signs:   Vitals:    07/31/25 0756   BP: 112/60   Pulse: 71   Temp: 98.2 °F (36.8 °C)   SpO2: 98%   Weight: 56.7 kg (125 lb)   Height: 152.4 cm (60\")   PainSc: 0-No pain       Wt Readings from Last 3 Encounters:   07/31/25 56.7 kg (125 lb)   06/30/25 56.7 kg (125 lb)   04/22/25 57.2 kg (126 lb 3.2 oz)     BP Readings from Last 3 Encounters:   07/31/25 112/60   06/30/25 110/64   04/22/25 109/79       Physical Exam  Vitals reviewed.   Constitutional:       General: She is not in acute distress.     Appearance: Normal appearance. She is well-developed and normal weight. She is not ill-appearing.   HENT:      Head: Normocephalic and atraumatic.   Eyes:      General: No scleral icterus.        Right eye: No discharge.         Left eye: No discharge.      Extraocular Movements: Extraocular movements intact.      Conjunctiva/sclera: Conjunctivae normal.   Neck:      Thyroid: No thyromegaly.      Vascular: No carotid bruit.      Trachea: Trachea normal.   Cardiovascular:      Rate and Rhythm: Normal rate and regular rhythm.      Pulses: Normal pulses.           Dorsalis pedis pulses are 2+ on the right side and 2+ on the left side.        Posterior tibial pulses are 2+ on the right side and 2+ on the left side.      Heart sounds: No murmur heard.  Pulmonary:      Effort: Pulmonary effort is normal.      Breath sounds: Normal breath sounds. No wheezing, rhonchi or rales.   Musculoskeletal:         General: Normal range of motion.      Cervical back: Normal range of motion and neck supple. No tenderness.      Right lower leg: No edema.      Left lower leg: No edema.      Right foot: Normal range of motion. No deformity or bunion.      Left foot: Normal range of motion. No deformity or bunion.   Feet:      Right foot:      Protective Sensation: 9 sites tested.  9 sites sensed.      Skin integrity: Skin integrity normal. No ulcer or blister.      Toenail Condition: Right toenails " are normal.      Left foot:      Protective Sensation: 9 sites tested.  9 sites sensed.      Skin integrity: Skin integrity normal. No ulcer or blister.      Toenail Condition: Left toenails are normal.      Comments: Diabetic Foot Exam Performed and Monofilament Test Performed     Lymphadenopathy:      Cervical: No cervical adenopathy.   Skin:     General: Skin is warm and dry.   Neurological:      Mental Status: She is alert and oriented to person, place, and time.   Psychiatric:         Mood and Affect: Mood and affect normal.         Behavior: Behavior normal.         Thought Content: Thought content normal.         Judgment: Judgment normal.         The following data was reviewed by BETSY Rocha on 07/31/2025:    Lab on 07/28/2025   Component Date Value    Hemoglobin A1C 07/28/2025 5.80 (H)     Glucose 07/28/2025 97     BUN 07/28/2025 20.1     Creatinine 07/28/2025 1.01 (H)     Sodium 07/28/2025 137     Potassium 07/28/2025 4.6     Chloride 07/28/2025 101     CO2 07/28/2025 25.3     Calcium 07/28/2025 9.8     Total Protein 07/28/2025 6.8     Albumin 07/28/2025 4.6     ALT (SGPT) 07/28/2025 15     AST (SGOT) 07/28/2025 16     Alkaline Phosphatase 07/28/2025 89     Total Bilirubin 07/28/2025 1.0     Globulin 07/28/2025 2.2     A/G Ratio 07/28/2025 2.1     BUN/Creatinine Ratio 07/28/2025 19.9     Anion Gap 07/28/2025 10.7     eGFR 07/28/2025 57.1 (L)     25 Hydroxy, Vitamin D 07/28/2025 63.4     Total Cholesterol 07/28/2025 142     Triglycerides 07/28/2025 127     HDL Cholesterol 07/28/2025 67 (H)     LDL Cholesterol  07/28/2025 53     VLDL Cholesterol 07/28/2025 22     LDL/HDL Ratio 07/28/2025 0.74     TSH 07/28/2025 1.950     Free T4 07/28/2025 1.22     Microalbumin/Creatinine * 07/28/2025      Creatinine, Urine 07/28/2025 107.6     Microalbumin, Urine 07/28/2025 <1.2     WBC 07/28/2025 7.56     RBC 07/28/2025 4.24     Hemoglobin 07/28/2025 13.1     Hematocrit 07/28/2025 41.0     MCV 07/28/2025  96.7     MCH 07/28/2025 30.9     MCHC 07/28/2025 32.0     RDW 07/28/2025 13.4     RDW-SD 07/28/2025 48.1     MPV 07/28/2025 11.4     Platelets 07/28/2025 329     Neutrophil % 07/28/2025 62.3     Lymphocyte % 07/28/2025 26.1     Monocyte % 07/28/2025 8.6     Eosinophil % 07/28/2025 1.7     Basophil % 07/28/2025 0.8     Immature Grans % 07/28/2025 0.5     Neutrophils, Absolute 07/28/2025 4.71     Lymphocytes, Absolute 07/28/2025 1.97     Monocytes, Absolute 07/28/2025 0.65     Eosinophils, Absolute 07/28/2025 0.13     Basophils, Absolute 07/28/2025 0.06     Immature Grans, Absolute 07/28/2025 0.04     nRBC 07/28/2025 0.0      Mammo Screening Modified With Tomosynthesis Left With CAD  Result Date: 7/29/2025  No mammographic evidence of malignancy.  Recommend annual screening mammography.  BI-RADS ASSESSMENT: Category 1: Negative  Note: It has been reported that there is approximately a 15% false negative rate in mammography.  Therefore, management of a palpable abnormality should not be deferred because of a negative mammogram.  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 Electronically Signed: Mitch Acosta MD  7/5/2025 12:30 AM EDT  Workstation ID: [id number]    CT Chest Low Dose Cancer Screening WO (07/26/2024 10:05)   DEXA Bone Density Axial (07/07/2023 13:11)     Assessment & Plan   Diagnoses and all orders for this visit:    1. Medicare annual wellness visit, subsequent (Primary)    2. Postmenopausal  -     DEXA Bone Density Axial; Future    3. Screening for lung cancer  -      CT Chest Low Dose Cancer Screening WO; Future    4. Personal history of nicotine dependence  -      CT Chest Low Dose Cancer Screening WO; Future        Assessment & Plan  1. Medicare annual wellness visit.  - Recent mammogram results were satisfactory, with a recommendation for annual screenings.  - Lipid profile  has been consistently within the normal range for the past two years.  - Thyroid function tests have been stable.  - Advised to continue current health maintenance practices and follow up with specialists as scheduled.    2. Diabetes Mellitus.  - A1c level has increased slightly to 5.8 from 5.4 in January 2025, but remains lower than the 6.1 recorded in June 2024.  - Advised to continue current diet and exercise regimen.  - A1c will be checked every 6 months.  - Discussed the impact of artificial sweeteners on insulin resistance.    3. Chronic Kidney Disease.  - Urine microalbumin was normal, but urine creatinine was 107, consistent with chronic kidney disease.  - eGFR is 57.1, indicating stable kidney function.  - Kidney function will be checked every 6 months.  - Advised to continue monitoring kidney labs as per nephrology's orders.    4. Medication Management.  - Advised to notify the office when refills are needed to avoid having excess medication.  - Discussed the importance of timely medication management and coordination with the pharmacy.    Follow-up: The patient will follow up in 6 months.       BMI is within normal parameters. No other follow-up for BMI required.       FOLLOW UP  Return in about 6 months (around 1/31/2026) for Next scheduled follow up, medication refills and fasting labs.    Lung Cancer Screening Counseling and Shared Decision-Making Visit  Using decision aid, ScreenLC.com, I have shared information with the patient about interventions to reduce the risk of dying from lung cancer, including quitting smoking and annual lung cancer screening.     Deanne meets all the following criteria to be eligible for Lung Cancer Screening:  Age 50-80  Asymptomatic (no signs or symptoms of lung cancer)  Tobacco smoking history of at least 20 pack years  Current smoker or has quit smoking within the past 15 years    We discussed that the Katki et al. prediction model estimates a reduced risk of death by lung  cancer with screening for Deanne.    I provided Deanne with information about the potential harms of screening, including: false positives and false negatives, follow-up diagnostic testing, over-diagnosis, and radiation exposure. I also counseled on the importance of adherence to annual lung cancer LDCT screening, impact of comorbidities, and ability or willingness to undergo diagnosis and treatment.     After our discussion, Deanne has decided to undergo screening with low-dose CT scan, and the order has been placed.    Additionally, Deanne was counseled on the importance of maintaining cigarette smoking abstinence if former smoker; or the importance of smoking cessation if current smoker. If appropriate, the patient was furnished with information about tobacco cessation interventions.    Patient was given instructions and counseling regarding her condition or for health maintenance advice. Please see specific information pulled into the AVS if appropriate.     Patient or patient representative verbalized consent for the use of Ambient Listening during the visit with  BETSY Rocha for chart documentation. 7/31/2025  08:22 EDT    BETSY Rocha  07/31/25  08:30 EDT

## 2025-08-01 ENCOUNTER — CLINICAL SUPPORT (OUTPATIENT)
Dept: FAMILY MEDICINE CLINIC | Facility: CLINIC | Age: 80
End: 2025-08-01
Payer: MEDICARE

## 2025-08-01 ENCOUNTER — OFFICE VISIT (OUTPATIENT)
Dept: ONCOLOGY | Facility: HOSPITAL | Age: 80
End: 2025-08-01
Payer: MEDICARE

## 2025-08-01 VITALS
BODY MASS INDEX: 25.07 KG/M2 | DIASTOLIC BLOOD PRESSURE: 70 MMHG | HEIGHT: 60 IN | RESPIRATION RATE: 18 BRPM | SYSTOLIC BLOOD PRESSURE: 117 MMHG | HEART RATE: 78 BPM | OXYGEN SATURATION: 99 % | TEMPERATURE: 97.5 F | WEIGHT: 127.7 LBS

## 2025-08-01 DIAGNOSIS — Z85.3 H/O MALIGNANT NEOPLASM OF BREAST: ICD-10-CM

## 2025-08-01 DIAGNOSIS — M81.0 OSTEOPOROSIS WITHOUT CURRENT PATHOLOGICAL FRACTURE, UNSPECIFIED OSTEOPOROSIS TYPE: Primary | ICD-10-CM

## 2025-08-01 DIAGNOSIS — J30.9 ALLERGIC RHINITIS, UNSPECIFIED SEASONALITY, UNSPECIFIED TRIGGER: Primary | ICD-10-CM

## 2025-08-01 DIAGNOSIS — C50.919 INVASIVE CARCINOMA OF BREAST: ICD-10-CM

## 2025-08-01 PROCEDURE — 95117 IMMUNOTHERAPY INJECTIONS: CPT | Performed by: NURSE PRACTITIONER

## 2025-08-01 RX ORDER — ANASTROZOLE 1 MG/1
1 TABLET ORAL DAILY
Qty: 90 TABLET | Refills: 1 | Status: SHIPPED | OUTPATIENT
Start: 2025-08-01

## 2025-08-01 NOTE — PROGRESS NOTES
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 Cristel Alicea Mariya Pittman is a 79 y.o. female who presents to [facility] for Right HR + Breast cancer, cT1,cN0Mx diagnosed 9/23/2024.    Patient underwent screening mammogram on 7/26/2024, which revealed questionable architectural distortion right breast, she subsequently underwent diagnostic mammogram on 9/3/2024 which revealed high suspicious mass in the right breast measuring 6 mm on mammogram corresponding to architectural distortion in the right breast at 12:00, also incidentally seen was a smoothly marginated hypoechoic 6 mm mass at 11:00.    Pathology report from right breast biopsy at 12 o'clock position on 9/23/2024 revealed at 11 o'clock position, 4 cm from nipple, invasive carcinoma no special type (ductal), grade 1, ER +95%, AR +50%, HER2 equivocal by IHC 2+, HER2 FISH was negative.    Past medical history: Type 2 diabetes, CKD stage III, hyperlipidemia    Social history: Quit smoking tobacco in 2018, used to smoke half a pack per day for 30 to 40 years    Family history: Patient reports 2 sisters with breast cancer diagnosed in their 40s in 1987, reports 1 sister also head rectal cancer along with breast cancer.      Interval history: Patient here to follow-up regarding adjuvant endocrine treatment after undergoing right mastectomy in November 2024. Pt is currently receiving treatment with anastrozole. Pt denies any side effects to  "Anastrozole. Again, No breast concerns reported.  Patient underwent mammogram and is here to discussed this result  History of Present Illness         Review of Systems   All other systems reviewed and are negative.           Objective     Vitals:    08/01/25 1008   BP: 117/70   Pulse: 78   Resp: 18   Temp: 97.5 °F (36.4 °C)   TempSrc: Oral   SpO2: 99%   Weight: 57.9 kg (127 lb 11.2 oz)   Height: 152.4 cm (60\")   PainSc: 0-No pain           ECOG score: 0         PHQ-9 Total Score:         Physical Exam  Vitals reviewed. Exam conducted with a chaperone present.   Constitutional:       General: She is not in acute distress.     Appearance: Normal appearance.   HENT:      Head: Normocephalic and atraumatic.   Eyes:      Extraocular Movements: Extraocular movements intact.      Conjunctiva/sclera: Conjunctivae normal.   Pulmonary:      Effort: Pulmonary effort is normal.   Musculoskeletal:      Cervical back: Normal range of motion and neck supple.   Skin:     General: Skin is warm and dry.      Findings: No bruising.   Neurological:      Mental Status: She is oriented to person, place, and time.         Physical Exam        Past Medical History     Past Medical History:   Diagnosis Date    Cancer     BREAST    Cardiomyopathy     PT STATES R/T TO PAST MEDS/NO ISSUES PER PATIENT, FOLLOWS W/PCP-B. VESSELS    CKD (chronic kidney disease) stage 3, GFR 30-59 ml/min     FOLLOWS EDUARDO/ TICO GR    Diabetes mellitus     Hyperlipidemia     Renal cyst     right    S/P right mastectomy 11/06/2024     Current Outpatient Medications on File Prior to Visit   Medication Sig Dispense Refill    atorvastatin (LIPITOR) 20 MG tablet TAKE 1 TABLET BY MOUTH AT NIGHT 90 tablet 3    CALCIUM PO Take 600 mg by mouth Daily.      carvedilol (COREG) 6.25 MG tablet Take 1 tablet by mouth 2 (two) times a day.      Cholecalciferol 25 MCG (1000 UT) tablet Take 1 tablet by mouth Daily.      cholestyramine (Questran) 4 g packet Take 1 packet by mouth " Daily As Needed (loose stools/diarrhea. Take 1 hour AFTER RX medications, OR take RX medications 4-6 hours AFTER cholestyramine). 30 each 0    cyanocobalamin (VITAMIN B-12) 1000 MCG tablet Take 1 tablet by mouth.      EPINEPHrine (EPIPEN) 0.3 MG/0.3ML solution auto-injector injection USE AS DIRECTED for severe allergic reaction      fluticasone (FLONASE) 50 MCG/ACT nasal spray USE 2 SPRAYS INTO THE NOSTRIL(S) DAILY AS DIRECTED BY PROVIDER 48 g 1    losartan (COZAAR) 25 MG tablet Take 1 tablet by mouth Daily.      metFORMIN (GLUCOPHAGE) 500 MG tablet Take 1 tablet by mouth 2 (Two) Times a Day With Meals. 180 tablet 1    [DISCONTINUED] anastrozole (ARIMIDEX) 1 MG tablet Take 1 tablet by mouth Daily. 90 tablet 1    vitamin D (ERGOCALCIFEROL) 1.25 MG (71052 UT) capsule capsule Take 1 capsule by mouth Every 7 (Seven) Days. (Patient not taking: Reported on 8/1/2025) 8 capsule 0     No current facility-administered medications on file prior to visit.      Allergies   Allergen Reactions    Penicillins Unknown - Low Severity     Beta lactam allergy details  Antibiotic reaction: unknown  Age at reaction: adult  Dose to reaction time: days  Reason for antibiotic: unknown  Epinephrine required for reaction?: no  Tolerated antibiotics: unknown       Farxiga [Dapagliflozin] Headache     Past Surgical History:   Procedure Laterality Date    BREAST SURGERY Left     lump removed    COLON SURGERY      COLOSTOMY AND REVERSAL    LIVER SURGERY      exploritory surgery due to puncture to liver     MASTECTOMY WITH SENTINEL NODE BIOPSY AND AXILLARY NODE DISSECTION Right 11/5/2024    Procedure: RIGHT BREAST MASTECTOMY WITH RIGHT SENTINEL NODE BIOPSY AND AXILLARY NODE DISSECTION;  Surgeon: Cristel Schwartz MD;  Location: [facility] [place];  Service: General;  Laterality: Right;    SUBTOTAL HYSTERECTOMY       Social History     Socioeconomic History    Marital status:    Tobacco Use    Smoking status: Former     Current packs/day: 0.00      Average packs/day: 0.5 packs/day for 45.0 years (22.5 ttl pk-yrs)     Types: Cigarettes     Start date:      Quit date: 2018     Years since quittin.5     Passive exposure: Past    Smokeless tobacco: Never   Vaping Use    Vaping status: Never Used   Substance and Sexual Activity    Alcohol use: Never    Drug use: Never    Sexual activity: Defer         Results     Result Review   The following data was reviewed by: Darby Cole MD   Lab Results   Component Value Date    HGB 13.1 2025    HCT 41.0 2025    MCV 96.7 2025     2025    WBC 7.56 2025    NEUTROABS 4.71 2025    LYMPHSABS 1.97 2025    MONOSABS 0.65 2025    EOSABS 0.13 2025    BASOSABS 0.06 2025     Lab Results   Component Value Date    GLUCOSE 97 2025    BUN 20.1 2025    CREATININE 1.01 (H) 2025     2025    K 4.6 2025     2025    CO2 25.3 2025    CALCIUM 9.8 2025    PROTEINTOT 6.8 2025    ALBUMIN 4.6 2025    BILITOT 1.0 2025    ALKPHOS 89 2025    AST 16 2025    ALT 15 2025     Lab Results   Component Value Date    MG 2.1 2021    PHOS 3.8 2021    FREET4 1.22 2025    TSH 1.950 2025       Surgical Pathology Report                         Case: [id number]                                   Authorizing Provider:  Tj Hernandez MD  Collected:           2024 02:05 PM           Ordering Location:     [facility]      Received:            2024 06:55 AM                                  MAMMOGRAPHY                                                                   Pathologist:           Michelle Urena DO                                                       Specimens:   1) - Breast, Right, RT BREAST U/S BX/1200, 4CMFN                                                    2) - Breast, Right, RT BREAST U/S BX/1100,4CMFN                                             Clinical Information    Right breast subareolar mass   Final Diagnosis   1. Right breast,  12 o'clock position, 4 cm from nipple, ultrasound-guided core biopsy:               - Fibroadenomatoid change  - Columnar cell change   - Fibrosis        2. 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)  - Histologic grade ([place] Histologic Score):    - Glandular (Acinar)/Tubular Differentiation:  Score 3  - Nuclear Pleomorphism:  Score 1  - Mitotic Rate:  Score 1  - Overall Grade:  Grade 1               - Size of largest focus of invasion:  8 mm               - Breast biomarker testing:                            - Estrogen Receptor (ER):  Positive (95%, strong)                            - Progesterone Receptor (PgR):  Positive (15%, strong)                            - HER2 (by immunohistochemistry):  Equivocal (Score 2+), see comment  - Ki-67:  12%               - Other findings:                            - 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 LUIS FERNANDO Hernandez at 16:11 on 9/25/2024 by Zia KOTHARI.           Comment:  HER2 FISH has been ordered.  See separate reference laboratory report for results.    Electronically signed by Michelle Urena DO on 9/25/2024 at 1641   Synoptic Checklist   Breast Biomarker Reporting Template   Protocol posted: 12/13/2023BREAST BIOMARKER REPORTING TEMPLATE - All Specimens  Test(s) Performed     Estrogen Receptor (ER) Status  Positive (greater than 10% of cells demonstrate nuclear positivity)   Percentage of Cells with Nuclear Positivity  95 %   Average Intensity of Staining  Strong   Test Type  Food and Drug Administration (FDA) cleared (test / vendor): Roche   Primary Antibody  SP1   Test(s)  Performed     Progesterone Receptor (PgR) Status  Positive   Percentage of Cells with Nuclear Positivity  15 %   Average Intensity of Staining  Strong   Test Type  Food and Drug Administration (FDA) cleared (test / vendor): Roche   Primary Antibody  1E2   Test(s) Performed     HER2 by Immunohistochemistry  Equivocal (Score 2+)   Percentage of Cells with Uniform Intense Complete Membrane Staining  0 %   Test Type  Food and Drug Administration (FDA) cleared (test / vendor): Roche   Primary Antibody  CB11   Test(s) Performed  Ki-67   Ki-67 Percentage of Positive Nuclei  12 %   Cold Ischemia and Fixation Times  Meet requirements specified in latest version of the ASCO / CAP Guidelines          No radiology results for the last day  Mammo Screening Modified With Tomosynthesis Left With CAD  Result Date: 7/29/2025  Impression: No mammographic evidence of malignancy.  Recommend annual screening mammography.  BI-RADS ASSESSMENT: Category 1: Negative  Note: It has been reported that there is approximately a 15% false negative rate in mammography.  Therefore, management of a palpable abnormality should not be deferred because of a negative mammogram.  7/29/2025 1:23 PM by Edgar Cook MD on Workstation: [id number]        Assessment & Plan     Diagnoses and all orders for this visit:    1. Osteoporosis without current pathological fracture, unspecified osteoporosis type (Primary)    2. Invasive carcinoma of breast  -     anastrozole (ARIMIDEX) 1 MG tablet; Take 1 tablet by mouth Daily.  Dispense: 90 tablet; Refill: 1  -     CBC & Differential; Future  -     Comprehensive Metabolic Panel; Future  -     MRI Breast Left With Contrast; Future    3. H/O malignant neoplasm of breast  -     MRI Breast Left With Contrast; Future          Deanne Pittman is a 79 y.o. female who presents to [facility] for Right HR + Breast cancer, Invasive carcinoma no special type (ductal), grade 1, ER +95%, ME +50%,  HER2 equivocal by IHC 2+, HER2 FISH was negative, pT2pN0, oncotype DX score of 15    -Reviewed mammogram, breast biopsy results with patient today and discussed diagnosis with patient.  -Patient is s/p right mastectomy performed by Dr. Schwartz  - Obtained Oncotype DX score report determine she requires adjuvant chemotherapy, pt with low score of 15, thus discussed that patient does not require adjuvant chemotherapy due to lack of benefit.  - Shared with her at a minimum she require 5 years of endocrine therapy with anastrozole, provided handout and discussed common side effects of this medication.  -Prescribed anastrozole on 12/12/2024, pt tolerating it well after 1 month, reviewed most recent labs, provided refills of Anastrozole, recommend pt continue Anastrozole  -from drug interaction check today, there does not appear to be an interaction between Atorvastatin and Anastrozole, informed pt of this today, if she has other questions about her statin recommend she discuss with her PCP an her next appointment, I do not recommend she stop atorvastatin without speaking with her PCP beforehand.    8/1/2025: pt here for anastrozole toxicity check, patient tolerating anastrozole well, again no report of significant hot flashes, mood disturbances.  Patient went mammogram in July 2025 which was BI-RADS 1 negative.  Discussed plan to obtain MRI breast in 6 months.  Patient not cleared to begin bisphosphonate this patient is on calcium vitamin D per nephrology. Thus pt will continue calcium and Vit D supplementation. Recommend pt continue anastrozole 1 mg PO QD    Family history of cancer  - Patient with 2 sisters with breast cancer diagnosed in the 40s, 1 sister also had rectal cancer  - Thus ordered [facility] genetic testing, in October 2024, which revealed a BARD1 mutation, refer patient to have genetic counseling as especially given patient having several questions regarding testing for her  daughter    Osteoporosis  -seen on DEXA scan on 7/7/2023, due again 7/2025 (ordered by her PCP)  -recommended pt discuss starting Zometa or prolia with nephrologist,pt was not cleared to start either of these medications, her nephrologist recommended calcium and Vit D supplementation alone  -given Vit D deficiency on labs, ordered Vit D supplementation 50K IU weekly for 8 weeks followed by dose of Vit D recommended by her nephrologist along with calcium supplementation  -also recommend dental evaluation for clearance prior to starting these medications as well.     Discussed plan to have patient follow-up in 3 months with labs CBC, CMP, Vit D and with MRI breast and follow up regarding DEXA scan ordered by PCP.     Please note that portions of this note were completed with a voice recognition program.      Electronically signed by Darby Cole MD, 08/01/25, 4:32 PM EDT.      Assessment & Plan      Follow Up     I spent 30 minutes caring for Deanne on this date of service. This time includes time spent by me in the following activities:preparing for the visit, reviewing tests, obtaining and/or reviewing a separately obtained history, performing a medically appropriate examination and/or evaluation , counseling and educating the patient/family/caregiver, ordering medications, tests, or procedures, referring and communicating with other health care professionals , documenting information in the medical record, independently interpreting results and communicating that information with the patient/family/caregiver, and care coordination      The plan was discussed with the patient and/or family. The patient was given time to ask questions and these questions were answered. At the conclusion of their visit they had no additional questions or concerns and all questions were answered to their satisfaction.    Patient was given instructions and counseling regarding her condition or for health maintenance advice. Please see  specific information pulled into the AVS if appropriate.

## 2025-08-04 ENCOUNTER — CLINICAL SUPPORT (OUTPATIENT)
Dept: FAMILY MEDICINE CLINIC | Facility: CLINIC | Age: 80
End: 2025-08-04
Payer: MEDICARE

## 2025-08-04 DIAGNOSIS — J30.9 ALLERGIC RHINITIS, UNSPECIFIED SEASONALITY, UNSPECIFIED TRIGGER: Primary | ICD-10-CM

## 2025-08-04 PROCEDURE — 95117 IMMUNOTHERAPY INJECTIONS: CPT | Performed by: NURSE PRACTITIONER

## 2025-08-05 DIAGNOSIS — C50.919 INVASIVE CARCINOMA OF BREAST: Primary | ICD-10-CM

## 2025-08-05 DIAGNOSIS — Z85.3 H/O MALIGNANT NEOPLASM OF BREAST: ICD-10-CM

## 2025-08-06 ENCOUNTER — CLINICAL SUPPORT (OUTPATIENT)
Dept: FAMILY MEDICINE CLINIC | Facility: CLINIC | Age: 80
End: 2025-08-06
Payer: MEDICARE

## 2025-08-06 DIAGNOSIS — J30.9 ALLERGIC RHINITIS, UNSPECIFIED SEASONALITY, UNSPECIFIED TRIGGER: Primary | ICD-10-CM

## 2025-08-06 PROCEDURE — 95117 IMMUNOTHERAPY INJECTIONS: CPT | Performed by: NURSE PRACTITIONER

## 2025-08-08 ENCOUNTER — CLINICAL SUPPORT (OUTPATIENT)
Dept: FAMILY MEDICINE CLINIC | Facility: CLINIC | Age: 80
End: 2025-08-08
Payer: MEDICARE

## 2025-08-08 DIAGNOSIS — J30.9 ALLERGIC RHINITIS, UNSPECIFIED SEASONALITY, UNSPECIFIED TRIGGER: Primary | ICD-10-CM

## 2025-08-08 PROCEDURE — 95117 IMMUNOTHERAPY INJECTIONS: CPT | Performed by: NURSE PRACTITIONER

## 2025-08-11 ENCOUNTER — CLINICAL SUPPORT (OUTPATIENT)
Dept: FAMILY MEDICINE CLINIC | Facility: CLINIC | Age: 80
End: 2025-08-11
Payer: MEDICARE

## 2025-08-11 DIAGNOSIS — J30.9 ALLERGIC RHINITIS, UNSPECIFIED SEASONALITY, UNSPECIFIED TRIGGER: Primary | ICD-10-CM

## 2025-08-11 PROCEDURE — 95117 IMMUNOTHERAPY INJECTIONS: CPT | Performed by: NURSE PRACTITIONER

## 2025-08-13 ENCOUNTER — CLINICAL SUPPORT (OUTPATIENT)
Dept: FAMILY MEDICINE CLINIC | Facility: CLINIC | Age: 80
End: 2025-08-13
Payer: MEDICARE

## 2025-08-13 DIAGNOSIS — J30.9 ALLERGIC RHINITIS, UNSPECIFIED SEASONALITY, UNSPECIFIED TRIGGER: Primary | ICD-10-CM

## 2025-08-13 PROCEDURE — 95117 IMMUNOTHERAPY INJECTIONS: CPT | Performed by: NURSE PRACTITIONER

## 2025-08-15 ENCOUNTER — CLINICAL SUPPORT (OUTPATIENT)
Dept: FAMILY MEDICINE CLINIC | Facility: CLINIC | Age: 80
End: 2025-08-15
Payer: MEDICARE

## 2025-08-15 DIAGNOSIS — J30.9 ALLERGIC RHINITIS, UNSPECIFIED SEASONALITY, UNSPECIFIED TRIGGER: Primary | ICD-10-CM

## 2025-08-15 PROCEDURE — 95117 IMMUNOTHERAPY INJECTIONS: CPT | Performed by: NURSE PRACTITIONER

## 2025-08-18 ENCOUNTER — CLINICAL SUPPORT (OUTPATIENT)
Dept: FAMILY MEDICINE CLINIC | Facility: CLINIC | Age: 80
End: 2025-08-18
Payer: MEDICARE

## 2025-08-18 DIAGNOSIS — J30.9 ALLERGIC RHINITIS, UNSPECIFIED SEASONALITY, UNSPECIFIED TRIGGER: Primary | ICD-10-CM

## 2025-08-18 PROCEDURE — 95117 IMMUNOTHERAPY INJECTIONS: CPT | Performed by: NURSE PRACTITIONER

## 2025-08-19 ENCOUNTER — CLINICAL SUPPORT (OUTPATIENT)
Dept: FAMILY MEDICINE CLINIC | Facility: CLINIC | Age: 80
End: 2025-08-19
Payer: MEDICARE

## 2025-08-19 DIAGNOSIS — J30.9 ALLERGIC RHINITIS, UNSPECIFIED SEASONALITY, UNSPECIFIED TRIGGER: Primary | ICD-10-CM

## 2025-08-19 PROCEDURE — 95117 IMMUNOTHERAPY INJECTIONS: CPT | Performed by: NURSE PRACTITIONER

## 2025-08-20 ENCOUNTER — CLINICAL SUPPORT (OUTPATIENT)
Dept: FAMILY MEDICINE CLINIC | Facility: CLINIC | Age: 80
End: 2025-08-20
Payer: MEDICARE

## 2025-08-20 ENCOUNTER — HOSPITAL ENCOUNTER (OUTPATIENT)
Dept: BONE DENSITY | Facility: HOSPITAL | Age: 80
Discharge: HOME | End: 2025-08-20
Payer: MEDICARE

## 2025-08-20 ENCOUNTER — HOSPITAL ENCOUNTER (OUTPATIENT)
Dept: CT IMAGING | Facility: HOSPITAL | Age: 80
Discharge: HOME | End: 2025-08-20
Payer: MEDICARE

## 2025-08-20 DIAGNOSIS — Z12.2 SCREENING FOR LUNG CANCER: ICD-10-CM

## 2025-08-20 DIAGNOSIS — J30.9 ALLERGIC RHINITIS, UNSPECIFIED SEASONALITY, UNSPECIFIED TRIGGER: Primary | ICD-10-CM

## 2025-08-20 DIAGNOSIS — Z78.0 POSTMENOPAUSAL: ICD-10-CM

## 2025-08-20 DIAGNOSIS — Z87.891 PERSONAL HISTORY OF NICOTINE DEPENDENCE: ICD-10-CM

## 2025-08-20 PROCEDURE — 95117 IMMUNOTHERAPY INJECTIONS: CPT | Performed by: NURSE PRACTITIONER

## 2025-08-20 PROCEDURE — 77080 DXA BONE DENSITY AXIAL: CPT

## 2025-08-20 PROCEDURE — 71271 CT THORAX LUNG CANCER SCR C-: CPT

## 2025-08-21 ENCOUNTER — RESULTS FOLLOW-UP (OUTPATIENT)
Dept: FAMILY MEDICINE CLINIC | Facility: CLINIC | Age: 80
End: 2025-08-21
Payer: MEDICARE

## 2025-08-25 ENCOUNTER — CLINICAL SUPPORT (OUTPATIENT)
Dept: FAMILY MEDICINE CLINIC | Facility: CLINIC | Age: 80
End: 2025-08-25
Payer: MEDICARE

## 2025-08-25 DIAGNOSIS — J30.9 ALLERGIC RHINITIS, UNSPECIFIED SEASONALITY, UNSPECIFIED TRIGGER: Primary | ICD-10-CM

## 2025-08-25 PROCEDURE — 95117 IMMUNOTHERAPY INJECTIONS: CPT | Performed by: NURSE PRACTITIONER

## 2025-08-27 ENCOUNTER — CLINICAL SUPPORT (OUTPATIENT)
Dept: FAMILY MEDICINE CLINIC | Facility: CLINIC | Age: 80
End: 2025-08-27
Payer: MEDICARE

## 2025-08-27 DIAGNOSIS — J30.9 ALLERGIC RHINITIS, UNSPECIFIED SEASONALITY, UNSPECIFIED TRIGGER: Primary | ICD-10-CM

## 2025-08-27 PROCEDURE — 95117 IMMUNOTHERAPY INJECTIONS: CPT | Performed by: NURSE PRACTITIONER

## 2025-08-28 ENCOUNTER — CLINICAL SUPPORT (OUTPATIENT)
Dept: FAMILY MEDICINE CLINIC | Facility: CLINIC | Age: 80
End: 2025-08-28
Payer: MEDICARE

## 2025-08-28 DIAGNOSIS — J30.9 ALLERGIC RHINITIS, UNSPECIFIED SEASONALITY, UNSPECIFIED TRIGGER: Primary | ICD-10-CM

## 2025-08-28 PROCEDURE — 95117 IMMUNOTHERAPY INJECTIONS: CPT | Performed by: STUDENT IN AN ORGANIZED HEALTH CARE EDUCATION/TRAINING PROGRAM

## (undated) DEVICE — SLV SCD KN/LEN ADJ EXPRSS BLENDED MD 1P/U

## (undated) DEVICE — CVR PROB 96IN LF STRL

## (undated) DEVICE — ANTIBACTERIAL VIOLET BRAIDED (POLYGLACTIN 910), SYNTHETIC ABSORBABLE SUTURE: Brand: COATED VICRYL

## (undated) DEVICE — JACKSON-PRATT 100CC BULB RESERVOIR: Brand: CARDINAL HEALTH

## (undated) DEVICE — GLV SURG SENSICARE PI ORTHO SZ8 LF STRL

## (undated) DEVICE — BLAKE SILICONE DRAIN, 19 FR ROUND, HUBLESS WITH 1/4" TROCAR: Brand: BLAKE

## (undated) DEVICE — PENCL SMOKE/EVAC MEGADYNE TELESCP 10FT

## (undated) DEVICE — GOWN,REINFRCE,POLY,SIRUS,BREATH SLV,XXLG: Brand: MEDLINE

## (undated) DEVICE — STERILE POLYISOPRENE POWDER-FREE SURGICAL GLOVES WITH EMOLLIENT COATING: Brand: PROTEXIS

## (undated) DEVICE — ANTIBACTERIAL UNDYED BRAIDED (POLYGLACTIN 910), SYNTHETIC ABSORBABLE SUTURE: Brand: COATED VICRYL

## (undated) DEVICE — STRIP CLS WND SUTURESTRIP/PLS 0.5X4IN TP1103

## (undated) DEVICE — DEV OPN LIGASURE DISSCT EXACT 40DEG 21.6MM

## (undated) DEVICE — GOWN,REINFORCE,POLY,SIRUS,BREATH SLV,XLG: Brand: MEDLINE

## (undated) DEVICE — BIOPATCH™ ANTIMICROBIAL DRESSING WITH CHLORHEXIDINE GLUCONATE IS A HYDROPHILLIC POLYURETHANE ABSORPTIVE FOAM WITH CHLORHEXIDINE GLUCONATE (CHG) WHICH INHIBITS BACTERIAL GROWTH UNDER THE DRESSING. THE DRESSING IS INTENDED TO BE USED TO ABSORB EXUDATE, COVER A WOUND CAUSED BY VASCULAR AND NONVASCULAR PERCUTANEOUS MEDICAL DEVICES DURING SURGERY, AS WELL AS REDUCE LOCAL INFECTION AND COLONIZATION OF MICROORGANISMS.: Brand: BIOPATCH

## (undated) DEVICE — DRSNG SURG AQUACEL AG/ADVNTGE 9X25CM 3.5X10IN

## (undated) DEVICE — SUT PERMAHAND SILK 0 FSL 30IN BLK

## (undated) DEVICE — INTENDED FOR TISSUE SEPARATION, AND OTHER PROCEDURES THAT REQUIRE A SHARP SURGICAL BLADE TO PUNCTURE OR CUT.: Brand: BARD-PARKER ® CARBON RIB-BACK BLADES